# Patient Record
Sex: FEMALE | Race: WHITE | NOT HISPANIC OR LATINO
[De-identification: names, ages, dates, MRNs, and addresses within clinical notes are randomized per-mention and may not be internally consistent; named-entity substitution may affect disease eponyms.]

---

## 2017-01-30 ENCOUNTER — NON-APPOINTMENT (OUTPATIENT)
Age: 82
End: 2017-01-30

## 2017-01-30 ENCOUNTER — APPOINTMENT (OUTPATIENT)
Dept: ELECTROPHYSIOLOGY | Facility: CLINIC | Age: 82
End: 2017-01-30

## 2017-01-30 VITALS
DIASTOLIC BLOOD PRESSURE: 80 MMHG | WEIGHT: 154.32 LBS | HEART RATE: 52 BPM | BODY MASS INDEX: 28.23 KG/M2 | OXYGEN SATURATION: 98 % | SYSTOLIC BLOOD PRESSURE: 153 MMHG

## 2017-03-16 ENCOUNTER — APPOINTMENT (OUTPATIENT)
Dept: ELECTROPHYSIOLOGY | Facility: CLINIC | Age: 82
End: 2017-03-16

## 2017-03-20 ENCOUNTER — APPOINTMENT (OUTPATIENT)
Dept: ELECTROPHYSIOLOGY | Facility: CLINIC | Age: 82
End: 2017-03-20

## 2017-03-21 ENCOUNTER — OUTPATIENT (OUTPATIENT)
Dept: OUTPATIENT SERVICES | Facility: HOSPITAL | Age: 82
LOS: 1 days | End: 2017-03-21
Payer: COMMERCIAL

## 2017-03-21 ENCOUNTER — APPOINTMENT (OUTPATIENT)
Dept: UROLOGY | Facility: CLINIC | Age: 82
End: 2017-03-21

## 2017-03-21 VITALS
RESPIRATION RATE: 16 BRPM | DIASTOLIC BLOOD PRESSURE: 77 MMHG | SYSTOLIC BLOOD PRESSURE: 166 MMHG | HEART RATE: 69 BPM | TEMPERATURE: 97.9 F

## 2017-03-21 DIAGNOSIS — R35.0 FREQUENCY OF MICTURITION: ICD-10-CM

## 2017-03-21 DIAGNOSIS — Z92.89 PERSONAL HISTORY OF OTHER MEDICAL TREATMENT: Chronic | ICD-10-CM

## 2017-03-21 DIAGNOSIS — Z98.49 CATARACT EXTRACTION STATUS, UNSPECIFIED EYE: Chronic | ICD-10-CM

## 2017-03-21 DIAGNOSIS — Z85.51 PERSONAL HISTORY OF MALIGNANT NEOPLASM OF BLADDER: ICD-10-CM

## 2017-03-21 PROCEDURE — 52000 CYSTOURETHROSCOPY: CPT

## 2017-03-23 DIAGNOSIS — Z85.51 PERSONAL HISTORY OF MALIGNANT NEOPLASM OF BLADDER: ICD-10-CM

## 2017-03-23 LAB — CORE LAB FLUID CYTOLOGY: NORMAL

## 2017-03-28 ENCOUNTER — APPOINTMENT (OUTPATIENT)
Dept: ELECTROPHYSIOLOGY | Facility: CLINIC | Age: 82
End: 2017-03-28

## 2017-04-26 ENCOUNTER — APPOINTMENT (OUTPATIENT)
Dept: ELECTROPHYSIOLOGY | Facility: CLINIC | Age: 82
End: 2017-04-26

## 2017-04-26 ENCOUNTER — NON-APPOINTMENT (OUTPATIENT)
Age: 82
End: 2017-04-26

## 2017-04-26 VITALS
DIASTOLIC BLOOD PRESSURE: 76 MMHG | HEIGHT: 62 IN | SYSTOLIC BLOOD PRESSURE: 149 MMHG | HEART RATE: 51 BPM | OXYGEN SATURATION: 100 %

## 2017-05-30 ENCOUNTER — APPOINTMENT (OUTPATIENT)
Dept: ELECTROPHYSIOLOGY | Facility: CLINIC | Age: 82
End: 2017-05-30
Payer: MEDICARE

## 2017-05-30 PROCEDURE — 93298 REM INTERROG DEV EVAL SCRMS: CPT

## 2017-05-30 PROCEDURE — 93299: CPT

## 2017-07-06 ENCOUNTER — APPOINTMENT (OUTPATIENT)
Dept: ELECTROPHYSIOLOGY | Facility: CLINIC | Age: 82
End: 2017-07-06

## 2017-09-18 ENCOUNTER — FORM ENCOUNTER (OUTPATIENT)
Age: 82
End: 2017-09-18

## 2017-09-19 ENCOUNTER — APPOINTMENT (OUTPATIENT)
Dept: CT IMAGING | Facility: IMAGING CENTER | Age: 82
End: 2017-09-19
Payer: MEDICARE

## 2017-09-19 ENCOUNTER — OUTPATIENT (OUTPATIENT)
Dept: OUTPATIENT SERVICES | Facility: HOSPITAL | Age: 82
LOS: 1 days | End: 2017-09-19
Payer: COMMERCIAL

## 2017-09-19 ENCOUNTER — APPOINTMENT (OUTPATIENT)
Dept: UROLOGY | Facility: CLINIC | Age: 82
End: 2017-09-19
Payer: MEDICARE

## 2017-09-19 VITALS
RESPIRATION RATE: 16 BRPM | TEMPERATURE: 98.1 F | HEART RATE: 80 BPM | SYSTOLIC BLOOD PRESSURE: 162 MMHG | DIASTOLIC BLOOD PRESSURE: 84 MMHG

## 2017-09-19 DIAGNOSIS — R35.0 FREQUENCY OF MICTURITION: ICD-10-CM

## 2017-09-19 DIAGNOSIS — Z92.89 PERSONAL HISTORY OF OTHER MEDICAL TREATMENT: Chronic | ICD-10-CM

## 2017-09-19 DIAGNOSIS — Z85.51 PERSONAL HISTORY OF MALIGNANT NEOPLASM OF BLADDER: ICD-10-CM

## 2017-09-19 DIAGNOSIS — Z98.49 CATARACT EXTRACTION STATUS, UNSPECIFIED EYE: Chronic | ICD-10-CM

## 2017-09-19 PROCEDURE — 71260 CT THORAX DX C+: CPT | Mod: 26

## 2017-09-19 PROCEDURE — 52000 CYSTOURETHROSCOPY: CPT

## 2017-09-19 PROCEDURE — 82565 ASSAY OF CREATININE: CPT

## 2017-09-19 PROCEDURE — 74178 CT ABD&PLV WO CNTR FLWD CNTR: CPT | Mod: 26

## 2017-09-19 PROCEDURE — 74178 CT ABD&PLV WO CNTR FLWD CNTR: CPT

## 2017-09-19 PROCEDURE — 71260 CT THORAX DX C+: CPT

## 2017-09-20 DIAGNOSIS — Z85.51 PERSONAL HISTORY OF MALIGNANT NEOPLASM OF BLADDER: ICD-10-CM

## 2017-09-20 LAB — CORE LAB FLUID CYTOLOGY: NORMAL

## 2017-09-26 ENCOUNTER — APPOINTMENT (OUTPATIENT)
Dept: ELECTROPHYSIOLOGY | Facility: CLINIC | Age: 82
End: 2017-09-26

## 2017-09-29 ENCOUNTER — OTHER (OUTPATIENT)
Age: 82
End: 2017-09-29

## 2017-10-23 ENCOUNTER — NON-APPOINTMENT (OUTPATIENT)
Age: 82
End: 2017-10-23

## 2017-10-23 ENCOUNTER — APPOINTMENT (OUTPATIENT)
Dept: CARDIOLOGY | Facility: CLINIC | Age: 82
End: 2017-10-23
Payer: MEDICARE

## 2017-10-23 VITALS
DIASTOLIC BLOOD PRESSURE: 100 MMHG | SYSTOLIC BLOOD PRESSURE: 184 MMHG | BODY MASS INDEX: 30.18 KG/M2 | WEIGHT: 164.03 LBS | HEIGHT: 62 IN | HEART RATE: 63 BPM

## 2017-10-23 PROCEDURE — 99215 OFFICE O/P EST HI 40 MIN: CPT

## 2017-10-23 PROCEDURE — 93000 ELECTROCARDIOGRAM COMPLETE: CPT

## 2017-10-31 ENCOUNTER — MED ADMIN CHARGE (OUTPATIENT)
Age: 82
End: 2017-10-31

## 2017-11-11 ENCOUNTER — APPOINTMENT (OUTPATIENT)
Dept: CARDIOLOGY | Facility: CLINIC | Age: 82
End: 2017-11-11
Payer: MEDICARE

## 2017-11-11 PROCEDURE — 76775 US EXAM ABDO BACK WALL LIM: CPT

## 2017-11-15 ENCOUNTER — APPOINTMENT (OUTPATIENT)
Dept: CARDIOLOGY | Facility: CLINIC | Age: 82
End: 2017-11-15
Payer: MEDICARE

## 2017-11-15 PROCEDURE — 93306 TTE W/DOPPLER COMPLETE: CPT

## 2017-11-20 ENCOUNTER — NON-APPOINTMENT (OUTPATIENT)
Age: 82
End: 2017-11-20

## 2017-11-20 ENCOUNTER — APPOINTMENT (OUTPATIENT)
Dept: CARDIOLOGY | Facility: CLINIC | Age: 82
End: 2017-11-20
Payer: MEDICARE

## 2017-11-20 VITALS
OXYGEN SATURATION: 95 % | SYSTOLIC BLOOD PRESSURE: 169 MMHG | DIASTOLIC BLOOD PRESSURE: 64 MMHG | BODY MASS INDEX: 30.18 KG/M2 | WEIGHT: 164 LBS | HEIGHT: 62 IN | HEART RATE: 65 BPM

## 2017-11-20 PROCEDURE — 99215 OFFICE O/P EST HI 40 MIN: CPT

## 2017-11-20 PROCEDURE — 93000 ELECTROCARDIOGRAM COMPLETE: CPT

## 2018-05-23 ENCOUNTER — APPOINTMENT (OUTPATIENT)
Dept: CARDIOLOGY | Facility: CLINIC | Age: 83
End: 2018-05-23
Payer: MEDICARE

## 2018-05-23 ENCOUNTER — NON-APPOINTMENT (OUTPATIENT)
Age: 83
End: 2018-05-23

## 2018-05-23 VITALS
HEIGHT: 62 IN | WEIGHT: 166 LBS | BODY MASS INDEX: 30.55 KG/M2 | OXYGEN SATURATION: 96 % | HEART RATE: 62 BPM | SYSTOLIC BLOOD PRESSURE: 177 MMHG | DIASTOLIC BLOOD PRESSURE: 68 MMHG

## 2018-05-23 PROCEDURE — 93000 ELECTROCARDIOGRAM COMPLETE: CPT

## 2018-05-23 PROCEDURE — 99214 OFFICE O/P EST MOD 30 MIN: CPT

## 2018-05-24 ENCOUNTER — OTHER (OUTPATIENT)
Age: 83
End: 2018-05-24

## 2018-05-24 LAB
ALBUMIN SERPL ELPH-MCNC: 4.4 G/DL
ALP BLD-CCNC: 68 U/L
ALT SERPL-CCNC: 13 U/L
ANION GAP SERPL CALC-SCNC: 16 MMOL/L
AST SERPL-CCNC: 22 U/L
BASOPHILS # BLD AUTO: 0.01 K/UL
BASOPHILS NFR BLD AUTO: 0.1 %
BILIRUB SERPL-MCNC: 0.5 MG/DL
BUN SERPL-MCNC: 15 MG/DL
CALCIUM SERPL-MCNC: 9.5 MG/DL
CHLORIDE SERPL-SCNC: 103 MMOL/L
CHOLEST SERPL-MCNC: 232 MG/DL
CHOLEST/HDLC SERPL: 3.3 RATIO
CO2 SERPL-SCNC: 23 MMOL/L
CREAT SERPL-MCNC: 0.58 MG/DL
EOSINOPHIL # BLD AUTO: 0.18 K/UL
EOSINOPHIL NFR BLD AUTO: 2.5 %
GLUCOSE SERPL-MCNC: 103 MG/DL
HCT VFR BLD CALC: 41 %
HDLC SERPL-MCNC: 70 MG/DL
HGB BLD-MCNC: 12.9 G/DL
IMM GRANULOCYTES NFR BLD AUTO: 0.3 %
LDLC SERPL CALC-MCNC: 141 MG/DL
LYMPHOCYTES # BLD AUTO: 1.74 K/UL
LYMPHOCYTES NFR BLD AUTO: 24.1 %
MAN DIFF?: NORMAL
MCHC RBC-ENTMCNC: 29.6 PG
MCHC RBC-ENTMCNC: 31.5 GM/DL
MCV RBC AUTO: 94 FL
MONOCYTES # BLD AUTO: 0.56 K/UL
MONOCYTES NFR BLD AUTO: 7.7 %
NEUTROPHILS # BLD AUTO: 4.72 K/UL
NEUTROPHILS NFR BLD AUTO: 65.3 %
PLATELET # BLD AUTO: 188 K/UL
POTASSIUM SERPL-SCNC: 4.4 MMOL/L
PROT SERPL-MCNC: 7.3 G/DL
RBC # BLD: 4.36 M/UL
RBC # FLD: 13.9 %
SODIUM SERPL-SCNC: 142 MMOL/L
TRIGL SERPL-MCNC: 107 MG/DL
WBC # FLD AUTO: 7.23 K/UL

## 2018-05-30 ENCOUNTER — APPOINTMENT (OUTPATIENT)
Dept: ELECTROPHYSIOLOGY | Facility: CLINIC | Age: 83
End: 2018-05-30

## 2018-06-06 ENCOUNTER — APPOINTMENT (OUTPATIENT)
Dept: CARDIOLOGY | Facility: CLINIC | Age: 83
End: 2018-06-06

## 2018-06-27 ENCOUNTER — APPOINTMENT (OUTPATIENT)
Dept: ELECTROPHYSIOLOGY | Facility: CLINIC | Age: 83
End: 2018-06-27
Payer: MEDICARE

## 2018-06-27 VITALS
BODY MASS INDEX: 29.44 KG/M2 | HEART RATE: 56 BPM | DIASTOLIC BLOOD PRESSURE: 77 MMHG | SYSTOLIC BLOOD PRESSURE: 180 MMHG | WEIGHT: 160 LBS | HEIGHT: 62 IN | OXYGEN SATURATION: 96 %

## 2018-06-27 DIAGNOSIS — I47.1 SUPRAVENTRICULAR TACHYCARDIA: ICD-10-CM

## 2018-06-27 PROCEDURE — 99214 OFFICE O/P EST MOD 30 MIN: CPT

## 2018-06-27 PROCEDURE — 93000 ELECTROCARDIOGRAM COMPLETE: CPT

## 2018-07-08 PROBLEM — I47.1 PAROXYSMAL SVT (SUPRAVENTRICULAR TACHYCARDIA): Status: ACTIVE | Noted: 2018-07-08

## 2018-09-18 ENCOUNTER — APPOINTMENT (OUTPATIENT)
Dept: UROLOGY | Facility: CLINIC | Age: 83
End: 2018-09-18

## 2018-10-19 ENCOUNTER — OTHER (OUTPATIENT)
Age: 83
End: 2018-10-19

## 2018-12-19 ENCOUNTER — APPOINTMENT (OUTPATIENT)
Dept: CARDIOLOGY | Facility: CLINIC | Age: 83
End: 2018-12-19
Payer: MEDICARE

## 2018-12-19 ENCOUNTER — NON-APPOINTMENT (OUTPATIENT)
Age: 83
End: 2018-12-19

## 2018-12-19 VITALS
SYSTOLIC BLOOD PRESSURE: 174 MMHG | BODY MASS INDEX: 30.36 KG/M2 | WEIGHT: 165 LBS | HEART RATE: 53 BPM | DIASTOLIC BLOOD PRESSURE: 76 MMHG | OXYGEN SATURATION: 97 % | HEIGHT: 62 IN

## 2018-12-19 PROCEDURE — 99214 OFFICE O/P EST MOD 30 MIN: CPT

## 2018-12-19 PROCEDURE — 93000 ELECTROCARDIOGRAM COMPLETE: CPT

## 2018-12-19 NOTE — HISTORY OF PRESENT ILLNESS
[FreeTextEntry1] : Adry presented to the office today for a followup evaluation. She was last seen in the office 6 months ago.\par \par She is now 84 years old, with a history of bladder cancer. She has a history of elevated blood pressures in the office without a definite diagnosis of hypertension, not presently treated. In May of 2016, she had an episode of syncope.  While hospitalized, she was observed to have nonsustained ventricular tachycardia. She was transferred for further evaluation. Nuclear stress testing and echocardiography were unremarkable. A loop recorder was implanted. Over time this has revealed nsvt and svt, but she has had no symptoms.  She has a 4.0 cm AAA, which has been followed.\par \par She presents to the office today having been feeling well. S  She reports no chest discomfort or difficulty breathing with limited activities. She denies orthopnea, PND and edema. She denies palpitations, dizziness and syncope. She has not really been checking her blood pressures at home.

## 2018-12-19 NOTE — PHYSICAL EXAM
[Normal Appearance] : normal appearance [General Appearance - In No Acute Distress] : no acute distress [Normal Conjunctiva] : the conjunctiva exhibited no abnormalities [Normal Oral Mucosa] : normal oral mucosa [Normal Jugular Venous V Waves Present] : normal jugular venous V waves present [Respiration, Rhythm And Depth] : normal respiratory rhythm and effort [Exaggerated Use Of Accessory Muscles For Inspiration] : no accessory muscle use [Auscultation Breath Sounds / Voice Sounds] : lungs were clear to auscultation bilaterally [Bowel Sounds] : normal bowel sounds [Abdomen Soft] : soft [Abdomen Tenderness] : non-tender [Abnormal Walk] : normal gait [Nail Clubbing] : no clubbing of the fingernails [Cyanosis, Localized] : no localized cyanosis [Petechial Hemorrhages (___cm)] : no petechial hemorrhages [Skin Color & Pigmentation] : normal skin color and pigmentation [] : no rash [No Venous Stasis] : no venous stasis [Skin Lesions] : no skin lesions [Oriented To Time, Place, And Person] : oriented to person, place, and time [Affect] : the affect was normal [Mood] : the mood was normal [No Anxiety] : not feeling anxious [Not Palpable] : not palpable [No Precordial Heave] : no precordial heave was noted [Rhythm Regular] : regular [Normal S1] : normal S1 [Normal S2] : normal S2 [No Gallop] : no gallop heard [No Murmur] : no murmurs heard [Right Carotid Bruit] : right carotid bruit heard [Left Carotid Bruit] : left carotid bruit heard [1+] : left 1+ [No Pitting Edema] : no pitting edema present [FreeTextEntry1] : No JVD [Bradycardia] : bradycardic [Bruit] : no bruit heard

## 2018-12-19 NOTE — DISCUSSION/SUMMARY
[FreeTextEntry1] : Adry is doing overall well from the perspective of symptoms, but has a very high blood pressure, again today.  Her blood pressure at home has been much better controlled.  Certainly, with her aneurysm, her blood pressure needs to be optimized. She will take blood pressures daily over the next month, and when she brings her  in next time, she will show me this list. I would certainly put her on a beta blocker, except that she has a tendency towards sinus bradycardia. \par \par She will have a repeat Doppler of the abdominal aorta to be evaluated her aneurysm, with comparison made to a year ago.

## 2018-12-29 ENCOUNTER — APPOINTMENT (OUTPATIENT)
Dept: CARDIOLOGY | Facility: CLINIC | Age: 83
End: 2018-12-29
Payer: MEDICARE

## 2018-12-29 PROCEDURE — 76775 US EXAM ABDO BACK WALL LIM: CPT

## 2019-01-02 ENCOUNTER — APPOINTMENT (OUTPATIENT)
Dept: ELECTROPHYSIOLOGY | Facility: CLINIC | Age: 84
End: 2019-01-02

## 2019-01-04 ENCOUNTER — NON-APPOINTMENT (OUTPATIENT)
Age: 84
End: 2019-01-04

## 2019-01-04 ENCOUNTER — APPOINTMENT (OUTPATIENT)
Dept: ELECTROPHYSIOLOGY | Facility: CLINIC | Age: 84
End: 2019-01-04
Payer: MEDICARE

## 2019-01-04 VITALS
WEIGHT: 162 LBS | BODY MASS INDEX: 29.81 KG/M2 | HEIGHT: 62 IN | RESPIRATION RATE: 18 BRPM | SYSTOLIC BLOOD PRESSURE: 178 MMHG | DIASTOLIC BLOOD PRESSURE: 80 MMHG | HEART RATE: 67 BPM | OXYGEN SATURATION: 100 %

## 2019-01-04 DIAGNOSIS — I47.2 VENTRICULAR TACHYCARDIA: ICD-10-CM

## 2019-01-04 DIAGNOSIS — R55 SYNCOPE AND COLLAPSE: ICD-10-CM

## 2019-01-04 DIAGNOSIS — R00.1 BRADYCARDIA, UNSPECIFIED: ICD-10-CM

## 2019-01-04 PROCEDURE — 93000 ELECTROCARDIOGRAM COMPLETE: CPT

## 2019-01-04 PROCEDURE — 99214 OFFICE O/P EST MOD 30 MIN: CPT | Mod: 25

## 2019-02-04 ENCOUNTER — NON-APPOINTMENT (OUTPATIENT)
Age: 84
End: 2019-02-04

## 2019-02-04 ENCOUNTER — APPOINTMENT (OUTPATIENT)
Dept: CARDIOLOGY | Facility: CLINIC | Age: 84
End: 2019-02-04
Payer: MEDICARE

## 2019-02-04 VITALS
HEART RATE: 55 BPM | HEIGHT: 63 IN | SYSTOLIC BLOOD PRESSURE: 170 MMHG | WEIGHT: 167 LBS | DIASTOLIC BLOOD PRESSURE: 77 MMHG | OXYGEN SATURATION: 99 % | BODY MASS INDEX: 29.59 KG/M2

## 2019-02-04 PROCEDURE — 93000 ELECTROCARDIOGRAM COMPLETE: CPT

## 2019-02-04 PROCEDURE — 99214 OFFICE O/P EST MOD 30 MIN: CPT

## 2019-02-04 NOTE — DISCUSSION/SUMMARY
[FreeTextEntry1] : Adry is doing overall well from the perspective of symptoms, but has a very high blood pressure, again today.  Her blood pressure at home has been better controlled, but also high.  Certainly, with her aneurysm, her blood pressure needs to be optimized.  \par \par She will start amlodipine 5 mg daily. She will come back to the office again in a month said that her blood pressure can be reevaluated. She will continue to check her blood pressure at home in the meantime.

## 2019-02-04 NOTE — HISTORY OF PRESENT ILLNESS
[FreeTextEntry1] : Adry presented to the office today for a followup evaluation. She was last seen in the office 6 months ago.\par \par She is now 84 years old, with a history of bladder cancer. She has a history of elevated blood pressures in the office without a definite diagnosis of hypertension, not presently treated. In May of 2016, she had an episode of syncope.  While hospitalized, she was observed to have nonsustained ventricular tachycardia. She was transferred for further evaluation. Nuclear stress testing and echocardiography were unremarkable. A loop recorder was implanted. Over time this has revealed nsvt and svt, but she has had no symptoms.  She has a 4.0 cm AAA, which has been followed.\par \par At the last visit, she was feeling reasonably well. Her blood pressure was elevated, and I asked her to check it at home.\par \par She continues to feel well. She reports no symptoms of angina, heart failure or arrhythmia. Although her systolic blood pressure is intermittently in the high 130s, it is more commonly elevated, at times severely, into the 170s. Out of stress, financial, and related to her .

## 2019-02-04 NOTE — PHYSICAL EXAM
[Normal Appearance] : normal appearance [General Appearance - In No Acute Distress] : no acute distress [Normal Conjunctiva] : the conjunctiva exhibited no abnormalities [Normal Oral Mucosa] : normal oral mucosa [Normal Jugular Venous V Waves Present] : normal jugular venous V waves present [Respiration, Rhythm And Depth] : normal respiratory rhythm and effort [Exaggerated Use Of Accessory Muscles For Inspiration] : no accessory muscle use [Auscultation Breath Sounds / Voice Sounds] : lungs were clear to auscultation bilaterally [Bowel Sounds] : normal bowel sounds [Abdomen Soft] : soft [Abdomen Tenderness] : non-tender [Abnormal Walk] : normal gait [Nail Clubbing] : no clubbing of the fingernails [Cyanosis, Localized] : no localized cyanosis [Petechial Hemorrhages (___cm)] : no petechial hemorrhages [Skin Color & Pigmentation] : normal skin color and pigmentation [] : no rash [No Venous Stasis] : no venous stasis [Skin Lesions] : no skin lesions [Oriented To Time, Place, And Person] : oriented to person, place, and time [Affect] : the affect was normal [Mood] : the mood was normal [No Anxiety] : not feeling anxious [Not Palpable] : not palpable [No Precordial Heave] : no precordial heave was noted [Bradycardia] : bradycardic [Rhythm Regular] : regular [Normal S1] : normal S1 [Normal S2] : normal S2 [No Gallop] : no gallop heard [No Murmur] : no murmurs heard [Right Carotid Bruit] : right carotid bruit heard [Left Carotid Bruit] : left carotid bruit heard [1+] : left 1+ [No Pitting Edema] : no pitting edema present [FreeTextEntry1] : No JVD [Bruit] : no bruit heard

## 2019-03-04 ENCOUNTER — NON-APPOINTMENT (OUTPATIENT)
Age: 84
End: 2019-03-04

## 2019-03-04 ENCOUNTER — APPOINTMENT (OUTPATIENT)
Dept: CARDIOLOGY | Facility: CLINIC | Age: 84
End: 2019-03-04
Payer: MEDICARE

## 2019-03-04 VITALS
BODY MASS INDEX: 29.95 KG/M2 | WEIGHT: 169 LBS | SYSTOLIC BLOOD PRESSURE: 172 MMHG | DIASTOLIC BLOOD PRESSURE: 78 MMHG | HEART RATE: 61 BPM | OXYGEN SATURATION: 98 % | HEIGHT: 63 IN

## 2019-03-04 PROCEDURE — 93000 ELECTROCARDIOGRAM COMPLETE: CPT

## 2019-03-04 PROCEDURE — 99214 OFFICE O/P EST MOD 30 MIN: CPT

## 2019-03-04 NOTE — PHYSICAL EXAM
[Normal Appearance] : normal appearance [General Appearance - In No Acute Distress] : no acute distress [Normal Conjunctiva] : the conjunctiva exhibited no abnormalities [Normal Oral Mucosa] : normal oral mucosa [Normal Jugular Venous V Waves Present] : normal jugular venous V waves present [FreeTextEntry1] : No JVD [Abnormal Walk] : normal gait [Nail Clubbing] : no clubbing of the fingernails [Cyanosis, Localized] : no localized cyanosis [Petechial Hemorrhages (___cm)] : no petechial hemorrhages [Skin Color & Pigmentation] : normal skin color and pigmentation [] : no rash [No Venous Stasis] : no venous stasis [Skin Lesions] : no skin lesions [Oriented To Time, Place, And Person] : oriented to person, place, and time [Affect] : the affect was normal [Mood] : the mood was normal [No Anxiety] : not feeling anxious

## 2019-03-04 NOTE — DISCUSSION/SUMMARY
[FreeTextEntry1] : Adry is doing overall well from the perspective of symptoms.  She has a very high blood pressure, again today.  Her blood pressure at home has been better controlled.  Her monitor works quite wel.\par \par She will continue amlodipine 5 mg daily. She will return in 3 months for another evaluation.

## 2019-03-04 NOTE — HISTORY OF PRESENT ILLNESS
[FreeTextEntry1] : Adry presented to the office today for a followup evaluation. She was last seen in the office 1 month ago.\par \par She is now 84 years old, with a history of bladder cancer. She has a history of elevated blood pressures in the office without a definite diagnosis of hypertension, not presently treated. In May of 2016, she had an episode of syncope.  While hospitalized, she was observed to have nonsustained ventricular tachycardia. She was transferred for further evaluation. Nuclear stress testing and echocardiography were unremarkable. A loop recorder was implanted. Over time this has revealed nsvt and svt, but she has had no symptoms.  She has a 4.0 cm AAA, which has been followed.\par \par At the last visit, she was feeling reasonably well. Her blood pressure was elevated.  I added amloidpine and asked that she continue checking it at home.\par \par She continues to feel well. She reports no symptoms of angina, heart failure or arrhythmia. Her blood pressure is labile.  it is improved overall, with readings commonly in the 120's/70's, with exceptions.

## 2019-03-09 PROBLEM — R00.1 BRADYCARDIA, SINUS: Status: ACTIVE | Noted: 2018-07-08

## 2019-03-09 PROBLEM — I47.2 VENTRICULAR TACHYCARDIA: Status: ACTIVE | Noted: 2017-10-23

## 2019-03-09 NOTE — DISCUSSION/SUMMARY
[FreeTextEntry1] : No recurrence of syncope.\par ILR interrogation: no tachy or harley\par Prior interrogation had shown both SVT and VT recorded on the ILR - had no symptoms. \par No recurrence of VT since Sept 22 2017\par Prior Echo EF nl\par Remote monitoring of ILR\par follow up with Dr. Riley.

## 2019-03-09 NOTE — PHYSICAL EXAM
[General Appearance - Well Developed] : well developed [General Appearance - In No Acute Distress] : no acute distress [Normal Conjunctiva] : the conjunctiva exhibited no abnormalities [No Oral Pallor] : no oral pallor [Normal Jugular Venous V Waves Present] : normal jugular venous V waves present [Respiration, Rhythm And Depth] : normal respiratory rhythm and effort [Auscultation Breath Sounds / Voice Sounds] : lungs were clear to auscultation bilaterally [Heart Rate And Rhythm] : heart rate and rhythm were normal [Heart Sounds] : normal S1 and S2 [Arterial Pulses Normal] : the arterial pulses were normal [Edema] : no peripheral edema present [Abnormal Walk] : normal gait [Nail Clubbing] : no clubbing of the fingernails [Cyanosis, Localized] : no localized cyanosis [No Venous Stasis] : no venous stasis [No Anxiety] : not feeling anxious

## 2019-03-09 NOTE — REVIEW OF SYSTEMS
[Feeling Fatigued] : feeling fatigued [Joint Pain] : joint pain [Negative] : ENT [Recent Weight Gain (___ Lbs)] : no recent weight gain [Recent Weight Loss (___ Lbs)] : no recent weight loss [Shortness Of Breath] : no shortness of breath [Chest Pain] : no chest pain [Palpitations] : no palpitations [Cough] : no cough [Abdominal Pain] : no abdominal pain [Dysuria] : no dysuria [Skin: A Rash] : no rash: [Dizziness] : no dizziness [Confusion] : no confusion was observed [Anxiety] : no anxiety [Excessive Thirst] : no polydipsia [Easy Bleeding] : no tendency for easy bleeding [Easy Bruising] : no tendency for easy bruising

## 2019-03-09 NOTE — HISTORY OF PRESENT ILLNESS
[FreeTextEntry1] : Patient is an 85 yo woman with prior episode of Syncope 2016 and had ILR implanted. \par She has had no recurrence of syncope since then. Denies palps, chest pain, SOB, dizziness\par She has h/o HTN treated with CCB and small Abd Aneurysm. \par  \par Prior Echo showed normal LV function\par \par ILR interrogation: Battery status good. No tachy/harley noted. \par \par

## 2019-04-29 ENCOUNTER — EMERGENCY (EMERGENCY)
Facility: HOSPITAL | Age: 84
LOS: 1 days | Discharge: ANOTHER HOSPITAL | End: 2019-04-29
Attending: EMERGENCY MEDICINE | Admitting: EMERGENCY MEDICINE
Payer: MEDICARE

## 2019-04-29 ENCOUNTER — INPATIENT (INPATIENT)
Facility: HOSPITAL | Age: 84
LOS: 1 days | Discharge: HOME | DRG: 247 | End: 2019-05-01
Attending: INTERNAL MEDICINE | Admitting: INTERNAL MEDICINE
Payer: MEDICARE

## 2019-04-29 ENCOUNTER — TRANSCRIPTION ENCOUNTER (OUTPATIENT)
Age: 84
End: 2019-04-29

## 2019-04-29 VITALS
HEIGHT: 62 IN | SYSTOLIC BLOOD PRESSURE: 135 MMHG | DIASTOLIC BLOOD PRESSURE: 76 MMHG | WEIGHT: 162.04 LBS | HEART RATE: 86 BPM | TEMPERATURE: 98 F | OXYGEN SATURATION: 99 % | RESPIRATION RATE: 15 BRPM

## 2019-04-29 VITALS
DIASTOLIC BLOOD PRESSURE: 76 MMHG | OXYGEN SATURATION: 95 % | RESPIRATION RATE: 16 BRPM | HEART RATE: 59 BPM | SYSTOLIC BLOOD PRESSURE: 108 MMHG

## 2019-04-29 VITALS
DIASTOLIC BLOOD PRESSURE: 59 MMHG | HEART RATE: 64 BPM | RESPIRATION RATE: 14 BRPM | OXYGEN SATURATION: 100 % | SYSTOLIC BLOOD PRESSURE: 144 MMHG | TEMPERATURE: 97 F

## 2019-04-29 DIAGNOSIS — Z92.89 PERSONAL HISTORY OF OTHER MEDICAL TREATMENT: Chronic | ICD-10-CM

## 2019-04-29 DIAGNOSIS — Z98.49 CATARACT EXTRACTION STATUS, UNSPECIFIED EYE: Chronic | ICD-10-CM

## 2019-04-29 DIAGNOSIS — I21.4 NON-ST ELEVATION (NSTEMI) MYOCARDIAL INFARCTION: ICD-10-CM

## 2019-04-29 LAB
ALBUMIN SERPL ELPH-MCNC: 3.7 G/DL — SIGNIFICANT CHANGE UP (ref 3.3–5)
ALBUMIN SERPL ELPH-MCNC: 4.1 G/DL — SIGNIFICANT CHANGE UP (ref 3.3–5)
ALP SERPL-CCNC: 79 U/L — SIGNIFICANT CHANGE UP (ref 40–120)
ALP SERPL-CCNC: 93 U/L — SIGNIFICANT CHANGE UP (ref 40–120)
ALT FLD-CCNC: 39 U/L — SIGNIFICANT CHANGE UP (ref 10–45)
ALT FLD-CCNC: 48 U/L — SIGNIFICANT CHANGE UP (ref 12–78)
ANION GAP SERPL CALC-SCNC: 14 MMOL/L — SIGNIFICANT CHANGE UP (ref 5–17)
ANION GAP SERPL CALC-SCNC: 7 MMOL/L — SIGNIFICANT CHANGE UP (ref 5–17)
APTT BLD: 30.3 SEC — SIGNIFICANT CHANGE UP (ref 27.5–36.3)
AST SERPL-CCNC: 181 U/L — HIGH (ref 10–40)
AST SERPL-CCNC: 301 U/L — HIGH (ref 15–37)
BASOPHILS # BLD AUTO: 0.01 K/UL — SIGNIFICANT CHANGE UP (ref 0–0.2)
BASOPHILS NFR BLD AUTO: 0.1 % — SIGNIFICANT CHANGE UP (ref 0–2)
BILIRUB SERPL-MCNC: 0.5 MG/DL — SIGNIFICANT CHANGE UP (ref 0.2–1.2)
BILIRUB SERPL-MCNC: 0.6 MG/DL — SIGNIFICANT CHANGE UP (ref 0.2–1.2)
BUN SERPL-MCNC: 12 MG/DL — SIGNIFICANT CHANGE UP (ref 7–23)
BUN SERPL-MCNC: 12 MG/DL — SIGNIFICANT CHANGE UP (ref 7–23)
CALCIUM SERPL-MCNC: 9 MG/DL — SIGNIFICANT CHANGE UP (ref 8.5–10.1)
CALCIUM SERPL-MCNC: 9.6 MG/DL — SIGNIFICANT CHANGE UP (ref 8.4–10.5)
CHLORIDE SERPL-SCNC: 101 MMOL/L — SIGNIFICANT CHANGE UP (ref 96–108)
CHLORIDE SERPL-SCNC: 106 MMOL/L — SIGNIFICANT CHANGE UP (ref 96–108)
CK MB CFR SERPL CALC: 180.6 NG/ML — HIGH (ref 0–3.6)
CO2 SERPL-SCNC: 23 MMOL/L — SIGNIFICANT CHANGE UP (ref 22–31)
CO2 SERPL-SCNC: 28 MMOL/L — SIGNIFICANT CHANGE UP (ref 22–31)
CREAT SERPL-MCNC: 0.7 MG/DL — SIGNIFICANT CHANGE UP (ref 0.5–1.3)
CREAT SERPL-MCNC: 0.86 MG/DL — SIGNIFICANT CHANGE UP (ref 0.5–1.3)
D DIMER BLD IA.RAPID-MCNC: 417 NG/ML DDU — HIGH
EOSINOPHIL # BLD AUTO: 0.11 K/UL — SIGNIFICANT CHANGE UP (ref 0–0.5)
EOSINOPHIL NFR BLD AUTO: 1.3 % — SIGNIFICANT CHANGE UP (ref 0–6)
GLUCOSE BLDC GLUCOMTR-MCNC: 99 MG/DL — SIGNIFICANT CHANGE UP (ref 70–99)
GLUCOSE SERPL-MCNC: 103 MG/DL — HIGH (ref 70–99)
GLUCOSE SERPL-MCNC: 133 MG/DL — HIGH (ref 70–99)
HCT VFR BLD CALC: 42.1 % — SIGNIFICANT CHANGE UP (ref 34.5–45)
HCT VFR BLD CALC: 43.1 % — SIGNIFICANT CHANGE UP (ref 34.5–45)
HGB BLD-MCNC: 13.8 G/DL — SIGNIFICANT CHANGE UP (ref 11.5–15.5)
HGB BLD-MCNC: 14.6 G/DL — SIGNIFICANT CHANGE UP (ref 11.5–15.5)
IMM GRANULOCYTES NFR BLD AUTO: 0.4 % — SIGNIFICANT CHANGE UP (ref 0–1.5)
INR BLD: 1.38 RATIO — HIGH (ref 0.88–1.16)
LIDOCAIN IGE QN: 76 U/L — SIGNIFICANT CHANGE UP (ref 73–393)
LYMPHOCYTES # BLD AUTO: 2.03 K/UL — SIGNIFICANT CHANGE UP (ref 1–3.3)
LYMPHOCYTES # BLD AUTO: 23.9 % — SIGNIFICANT CHANGE UP (ref 13–44)
MCHC RBC-ENTMCNC: 30.1 PG — SIGNIFICANT CHANGE UP (ref 27–34)
MCHC RBC-ENTMCNC: 31.7 PG — SIGNIFICANT CHANGE UP (ref 27–34)
MCHC RBC-ENTMCNC: 32.8 GM/DL — SIGNIFICANT CHANGE UP (ref 32–36)
MCHC RBC-ENTMCNC: 33.8 GM/DL — SIGNIFICANT CHANGE UP (ref 32–36)
MCV RBC AUTO: 91.9 FL — SIGNIFICANT CHANGE UP (ref 80–100)
MCV RBC AUTO: 93.6 FL — SIGNIFICANT CHANGE UP (ref 80–100)
MONOCYTES # BLD AUTO: 0.88 K/UL — SIGNIFICANT CHANGE UP (ref 0–0.9)
MONOCYTES NFR BLD AUTO: 10.4 % — SIGNIFICANT CHANGE UP (ref 2–14)
NEUTROPHILS # BLD AUTO: 5.44 K/UL — SIGNIFICANT CHANGE UP (ref 1.8–7.4)
NEUTROPHILS NFR BLD AUTO: 63.9 % — SIGNIFICANT CHANGE UP (ref 43–77)
NRBC # BLD: 0 /100 WBCS — SIGNIFICANT CHANGE UP (ref 0–0)
PLATELET # BLD AUTO: 158 K/UL — SIGNIFICANT CHANGE UP (ref 150–400)
PLATELET # BLD AUTO: 191 K/UL — SIGNIFICANT CHANGE UP (ref 150–400)
POTASSIUM SERPL-MCNC: 3.8 MMOL/L — SIGNIFICANT CHANGE UP (ref 3.5–5.3)
POTASSIUM SERPL-MCNC: 6.2 MMOL/L — CRITICAL HIGH (ref 3.5–5.3)
POTASSIUM SERPL-SCNC: 3.8 MMOL/L — SIGNIFICANT CHANGE UP (ref 3.5–5.3)
POTASSIUM SERPL-SCNC: 6.2 MMOL/L — CRITICAL HIGH (ref 3.5–5.3)
PROT SERPL-MCNC: 7.4 G/DL — SIGNIFICANT CHANGE UP (ref 6–8.3)
PROT SERPL-MCNC: 7.7 G/DL — SIGNIFICANT CHANGE UP (ref 6–8.3)
PROTHROM AB SERPL-ACNC: 15.8 SEC — HIGH (ref 10–12.9)
RBC # BLD: 4.58 M/UL — SIGNIFICANT CHANGE UP (ref 3.8–5.2)
RBC # BLD: 4.61 M/UL — SIGNIFICANT CHANGE UP (ref 3.8–5.2)
RBC # FLD: 12.2 % — SIGNIFICANT CHANGE UP (ref 10.3–14.5)
RBC # FLD: 13.1 % — SIGNIFICANT CHANGE UP (ref 10.3–14.5)
SODIUM SERPL-SCNC: 138 MMOL/L — SIGNIFICANT CHANGE UP (ref 135–145)
SODIUM SERPL-SCNC: 141 MMOL/L — SIGNIFICANT CHANGE UP (ref 135–145)
TROPONIN I SERPL-MCNC: 92.5 NG/ML — HIGH (ref 0.01–0.04)
WBC # BLD: 8.5 K/UL — SIGNIFICANT CHANGE UP (ref 3.8–10.5)
WBC # BLD: 8.8 K/UL — SIGNIFICANT CHANGE UP (ref 3.8–10.5)
WBC # FLD AUTO: 8.5 K/UL — SIGNIFICANT CHANGE UP (ref 3.8–10.5)
WBC # FLD AUTO: 8.8 K/UL — SIGNIFICANT CHANGE UP (ref 3.8–10.5)

## 2019-04-29 PROCEDURE — 71046 X-RAY EXAM CHEST 2 VIEWS: CPT

## 2019-04-29 PROCEDURE — 99285 EMERGENCY DEPT VISIT HI MDM: CPT

## 2019-04-29 PROCEDURE — 92928 PRQ TCAT PLMT NTRAC ST 1 LES: CPT | Mod: RC,GC

## 2019-04-29 PROCEDURE — 93005 ELECTROCARDIOGRAM TRACING: CPT

## 2019-04-29 PROCEDURE — 71046 X-RAY EXAM CHEST 2 VIEWS: CPT | Mod: 26

## 2019-04-29 PROCEDURE — 85027 COMPLETE CBC AUTOMATED: CPT

## 2019-04-29 PROCEDURE — 93010 ELECTROCARDIOGRAM REPORT: CPT

## 2019-04-29 PROCEDURE — 99285 EMERGENCY DEPT VISIT HI MDM: CPT | Mod: 25

## 2019-04-29 PROCEDURE — 84484 ASSAY OF TROPONIN QUANT: CPT

## 2019-04-29 PROCEDURE — 85379 FIBRIN DEGRADATION QUANT: CPT

## 2019-04-29 PROCEDURE — 36415 COLL VENOUS BLD VENIPUNCTURE: CPT

## 2019-04-29 PROCEDURE — 93454 CORONARY ARTERY ANGIO S&I: CPT | Mod: 26,59,GC

## 2019-04-29 PROCEDURE — 83690 ASSAY OF LIPASE: CPT

## 2019-04-29 PROCEDURE — 99152 MOD SED SAME PHYS/QHP 5/>YRS: CPT | Mod: GC

## 2019-04-29 PROCEDURE — 70450 CT HEAD/BRAIN W/O DYE: CPT | Mod: 26

## 2019-04-29 PROCEDURE — 82553 CREATINE MB FRACTION: CPT

## 2019-04-29 PROCEDURE — 85610 PROTHROMBIN TIME: CPT

## 2019-04-29 PROCEDURE — 92941 PRQ TRLML REVSC TOT OCCL AMI: CPT | Mod: LC,GC

## 2019-04-29 PROCEDURE — 80053 COMPREHEN METABOLIC PANEL: CPT

## 2019-04-29 PROCEDURE — 85730 THROMBOPLASTIN TIME PARTIAL: CPT

## 2019-04-29 RX ORDER — LACTOBACILLUS ACIDOPHILUS 100MM CELL
1 CAPSULE ORAL DAILY
Qty: 0 | Refills: 0 | Status: DISCONTINUED | OUTPATIENT
Start: 2019-04-29 | End: 2019-05-01

## 2019-04-29 RX ORDER — ASPIRIN/CALCIUM CARB/MAGNESIUM 324 MG
81 TABLET ORAL DAILY
Qty: 0 | Refills: 0 | Status: DISCONTINUED | OUTPATIENT
Start: 2019-04-30 | End: 2019-05-01

## 2019-04-29 RX ORDER — TICAGRELOR 90 MG/1
90 TABLET ORAL
Qty: 0 | Refills: 0 | Status: DISCONTINUED | OUTPATIENT
Start: 2019-04-30 | End: 2019-05-01

## 2019-04-29 RX ORDER — ASCORBIC ACID 60 MG
500 TABLET,CHEWABLE ORAL DAILY
Qty: 0 | Refills: 0 | Status: DISCONTINUED | OUTPATIENT
Start: 2019-04-29 | End: 2019-05-01

## 2019-04-29 RX ORDER — CHOLECALCIFEROL (VITAMIN D3) 125 MCG
2000 CAPSULE ORAL DAILY
Qty: 0 | Refills: 0 | Status: DISCONTINUED | OUTPATIENT
Start: 2019-04-29 | End: 2019-05-01

## 2019-04-29 RX ORDER — ASPIRIN/CALCIUM CARB/MAGNESIUM 324 MG
325 TABLET ORAL ONCE
Qty: 0 | Refills: 0 | Status: COMPLETED | OUTPATIENT
Start: 2019-04-29 | End: 2019-04-29

## 2019-04-29 RX ADMIN — Medication 500 MILLIGRAM(S): at 23:19

## 2019-04-29 RX ADMIN — Medication 325 MILLIGRAM(S): at 17:49

## 2019-04-29 RX ADMIN — Medication 2000 UNIT(S): at 23:19

## 2019-04-29 RX ADMIN — Medication 1 TABLET(S): at 23:19

## 2019-04-29 NOTE — ED ADULT NURSE NOTE - OBJECTIVE STATEMENT
Received the patient in the Er. Patient is alert and oriented. Skin warm and dry. C/O Chest pressure since yesterday. Denies any pain at this time.

## 2019-04-29 NOTE — CONSULT NOTE ADULT - SUBJECTIVE AND OBJECTIVE BOX
Neurology Consult Note    Name  VANNESA HICKS    HPI:    Patient is an 85 year old Female with PMHx of AAA, HTN, admitted for cardiac cath for chest pain, presenting post-procedure w/ LUE weakness.  Per cardiac team, patient had just been in cardiac cath procedure, in her usual state, but after being brought to the [facility], she was noted to have LUE weakness, which was resolving but still present.  Code stroke called, NIHSS 0, MRS 0, tPA not administered as symptoms resolved and patient on heparin.  She denies any headache, vision changes, or sensory changes.      Subjective:    Review of Systems:  Constitutional:        Eyes, Ears, Mouth, Throat:   Respiratory:                            Cardiovascular:   Gastrointestinal:                                     Genitourinary:   Musculoskeletal:                                    Dermatologic:   Neurological: as per above                                                                 Psychiatric:   Endocrine:              Hematologic/Lymphatic:     MEDICATIONS  (STANDING):    MEDICATIONS  (PRN):      Allergies    adhesives (Other)  latex (Rash)  No Known Drug Allergies    Intolerances    statins (Muscle Pain)    PAST MEDICAL & SURGICAL HISTORY:  HTN (hypertension)  Bladder cancer  No pertinent past medical history  Back pain, chronic: occasional lower back pain  Benign paroxysmal vertigo of right ear: history of right ear abscess  Bladder neoplasm  Bladder cancer: dx 2012 treated with BCG  Status post cataract extraction: OD  History of cardiac monitoring: loop monitor  No significant past surgical history  S/P cystoscopy  S/P D&C  S/P breast biopsy, left    FH:  SH:    Objective:   Vital Signs Last 24 Hrs  T(C): 36.2 (29 Apr 2019 18:04), Max: 36.7 (29 Apr 2019 16:04)  T(F): 97.2 (29 Apr 2019 18:04), Max: 98.1 (29 Apr 2019 16:04)  HR: 64 (29 Apr 2019 18:04) (64 - 86)  BP: 144/59 (29 Apr 2019 18:04) (128/60 - 144/59)  BP(mean): --  RR: 14 (29 Apr 2019 18:04) (14 - 15)  SpO2: 100% (29 Apr 2019 18:04) (98% - 100%)    General Exam:   General appearance: No acute distress                   Neurological Exam:  Mental Status: Orientated to self, date and place.  Attention intact.  No dysarthria, aphasia or neglect.  Knowledge intact.  Registration intact.  Short and long term memory grossly intact.      Cranial Nerves:  PERRL, EOMI, VFF, no nystagmus or diplopia.  CN V1-3 intact to light touch and pinprick.  No facial asymmetry.  Hearing intact to finger rub bilaterally.  Tongue, uvula and palate midline.  Sternocleidomastoid and Trapezius intact bilaterally.    Motor:   Tone: normal.                  Strength:     [] Upper extremity                      Delt       Bicep    Tricep                                                  R         5/5 5/5        5/5       5/5                                               L          5/5        5/5        5/5       5/5  [] Lower extremity                       HF          KE          KF        DF         PF                                               R        5/5 5/5        5/5       5/5       5/5                                               L         5/5 5/5       5/5       5/5 5/5  Pronator drift: none                 Dysmetria: None to finger-nose-finger    Sensation: grossly intact to light touch    Deep Tendon Reflexes: 1+ bilateral biceps, triceps, brachioradialis, knee and ankle  Toes flexor bilaterally      Other:                        13.8   8.50  )-----------( 191      ( 29 Apr 2019 16:40 )             42.1     04-29    141  |  106  |  12  ----------------------------<  133<H>  3.8   |  28  |  0.86    Ca    9.0      29 Apr 2019 16:40    TPro  7.7  /  Alb  3.7  /  TBili  0.6  /  DBili  x   /  AST  301<H>  /  ALT  48  /  AlkPhos  93  04-29    LIVER FUNCTIONS - ( 29 Apr 2019 16:40 )  Alb: 3.7 g/dL / Pro: 7.7 g/dL / ALK PHOS: 93 U/L / ALT: 48 U/L / AST: 301 U/L / GGT: x           PT/INR - ( 29 Apr 2019 16:40 )   PT: 15.8 sec;   INR: 1.38 ratio         PTT - ( 29 Apr 2019 16:40 )  PTT:30.3 sec    Radiology    EKG:  tele:  TTE:  EEG:

## 2019-04-29 NOTE — CONSULT NOTE ADULT - ASSESSMENT
85 year old Female with PMHx of AAA, HTN, admitted for cardiac cath for chest pain, presenting post-procedure w/ LUE weakness.  Neurological exam non-focal.  CTH pending read, but appears grossly normal.      Plan:  - MRI brain w/o contrast when patient stable from cardiac perspective  - start ASA 81mg  - start Lipitor 80mg qhs  - LDL, Hba1c  - physical therapy/OT 85 year old Female with PMHx of AAA, HTN, admitted for cardiac cath for chest pain, presenting post-procedure w/ LUE weakness.  Neurological exam non-focal.  CTH negative.    Plan:  - MRI brain w/o contrast when patient stable from cardiac perspective  - start ASA 81mg  - start Lipitor 80mg qhs  - LDL, Hba1c  - physical therapy/OT

## 2019-04-29 NOTE — H&P CARDIOLOGY - HISTORY OF PRESENT ILLNESS
86 y/o female, recent loop recorder placed by Dr. DHARMESH Ibarra, pmhx AAA, HTN, syncope, presents to [facility] with c/o chest pain. Positive for NSTEMI, seen by Dr. VIC Riley at [facility], transferred to [facility] for cardiac cath on 4/29/19. Stents placed to the mid RCA and mid circumflex on 4/29/19 via right ulnar artery access. Post procedure patient c/o inability to move left arm or left leg, evaluated by Neurology team, sent for STAT head CT without contrast. Head CT negative for infarct, unchanged from prior CT, patient currently A&O x3 with full ROM in all extremities, no c/o headache, blurred vision, dizziness, N/V.

## 2019-04-29 NOTE — CONSULT NOTE ADULT - ATTENDING COMMENTS
VASCULAR NEUROLOGY ATTENDING  The patient is seen and examined the history and imaging are reviewed. I agree with the resident note unless otherwise noted. Patient appears at neurologic baseline. Agree with antiplatelets. Will arrange outpatient neurology follow-up.

## 2019-04-29 NOTE — H&P CARDIOLOGY - PMH
AAA (abdominal aortic aneurysm)    Back pain, chronic  occasional lower back pain  Benign paroxysmal vertigo of right ear  history of right ear abscess  Bladder cancer  dx 2012 treated with BCG  Coronary artery disease of native artery of native heart with stable angina pectoris    History of loop recorder  placed by Dr. DHARMESH Ibarra  HTN (hypertension)    Syncope, unspecified syncope type

## 2019-04-29 NOTE — PROGRESS NOTE ADULT - SUBJECTIVE AND OBJECTIVE BOX
Removal of Right Radial Band    Pulses in the right upper extremity are (palpable/audible by doppler/absent). The patient was placed in the supine position. The insertion site was identified and the band deflated per protocol. The radial band was removed slowly. Direct pressure was applied for 15 minutes.    Monitoring of the right wrist and both upper extremities including neuro-vascular checks and vital signs every 15 minutes x 4.    Complications: None    Comments: Removal of Right Ulnar Band    Pulses in the right upper extremity are palpable. The patient was placed in the supine position. The insertion site was identified and the band deflated per protocol. The ulnar band was removed slowly. Direct pressure was applied for 15 minutes.    Monitoring of the right wrist and both upper extremities including neuro-vascular checks and vital signs every 15 minutes x 4.    Complications: None    Comments:

## 2019-04-29 NOTE — ED PROVIDER NOTE - OBJECTIVE STATEMENT
84 y/o F with c/o epigastric pain/chest pain x 1 day associated with SOB.  Pt denies radiation of pain. + nausea.  Pt tried to make herself vomit without relief of symptoms.  Pt states chest pain is intermittent.  Pt called her cardiologist, Dr. Riley, and was told to go to the ED.

## 2019-04-29 NOTE — CHART NOTE - NSCHARTNOTEFT_GEN_A_CORE
SHAHIDA Diaz RN, received call from [facility] lab for serum K+ level of 6.2, reports hemolyzed specimen. Patient keke A&O x3, SR 72 bpm on telemetry, will recheck serum K+ with next blood draw.          Colin Morin, [facility]    [phone] SHAHIDA Diaz RN, received call from [facility] lab for serum K+ level of 6.2, reports hemolyzed specimen. Patient currently A&O x3, SR 72 bpm on telemetry, will recheck serum K+ with next blood draw.          Colin Morin, [facility]    [phone]

## 2019-04-29 NOTE — PATIENT PROFILE ADULT - EQUIPMENT CURRENTLY USED AT HOME
----- Message from Brett Mullins MD sent at 6/23/2018 11:00 AM CDT -----  Regarding: ferritin  Please call patient with normal/stable ferritin level. No evidence of further blood loss or iron loss.  Thanks  GR   no

## 2019-04-29 NOTE — ED ADULT NURSE NOTE - PMH
Back pain, chronic  occasional lower back pain  Benign paroxysmal vertigo of right ear  history of right ear abscess  Bladder cancer  dx 2012 treated with BCG  HTN (hypertension)

## 2019-04-30 ENCOUNTER — TRANSCRIPTION ENCOUNTER (OUTPATIENT)
Age: 84
End: 2019-04-30

## 2019-04-30 LAB
ANION GAP SERPL CALC-SCNC: 13 MMOL/L — SIGNIFICANT CHANGE UP (ref 5–17)
BASOPHILS # BLD AUTO: 0 K/UL — SIGNIFICANT CHANGE UP (ref 0–0.2)
BASOPHILS NFR BLD AUTO: 0.1 % — SIGNIFICANT CHANGE UP (ref 0–2)
BUN SERPL-MCNC: 14 MG/DL — SIGNIFICANT CHANGE UP (ref 7–23)
CALCIUM SERPL-MCNC: 9.5 MG/DL — SIGNIFICANT CHANGE UP (ref 8.4–10.5)
CHLORIDE SERPL-SCNC: 102 MMOL/L — SIGNIFICANT CHANGE UP (ref 96–108)
CK MB BLD-MCNC: 5.3 % — HIGH (ref 0–3.5)
CK MB BLD-MCNC: 7 % — HIGH (ref 0–3.5)
CK MB BLD-MCNC: 8.2 % — HIGH (ref 0–3.5)
CK MB CFR SERPL CALC: 24.5 NG/ML — HIGH (ref 0–3.8)
CK MB CFR SERPL CALC: 40.8 NG/ML — HIGH (ref 0–3.8)
CK MB CFR SERPL CALC: 60.1 NG/ML — HIGH (ref 0–3.8)
CK SERPL-CCNC: 463 U/L — HIGH (ref 25–170)
CK SERPL-CCNC: 582 U/L — HIGH (ref 25–170)
CK SERPL-CCNC: 734 U/L — HIGH (ref 25–170)
CO2 SERPL-SCNC: 24 MMOL/L — SIGNIFICANT CHANGE UP (ref 22–31)
CREAT SERPL-MCNC: 0.71 MG/DL — SIGNIFICANT CHANGE UP (ref 0.5–1.3)
EOSINOPHIL # BLD AUTO: 0.1 K/UL — SIGNIFICANT CHANGE UP (ref 0–0.5)
EOSINOPHIL NFR BLD AUTO: 1.7 % — SIGNIFICANT CHANGE UP (ref 0–6)
GLUCOSE SERPL-MCNC: 129 MG/DL — HIGH (ref 70–99)
HBA1C BLD-MCNC: 6.5 % — HIGH (ref 4–5.6)
HCT VFR BLD CALC: 41.2 % — SIGNIFICANT CHANGE UP (ref 34.5–45)
HGB BLD-MCNC: 14.1 G/DL — SIGNIFICANT CHANGE UP (ref 11.5–15.5)
LYMPHOCYTES # BLD AUTO: 1.3 K/UL — SIGNIFICANT CHANGE UP (ref 1–3.3)
LYMPHOCYTES # BLD AUTO: 17.3 % — SIGNIFICANT CHANGE UP (ref 13–44)
MCHC RBC-ENTMCNC: 31.9 PG — SIGNIFICANT CHANGE UP (ref 27–34)
MCHC RBC-ENTMCNC: 34.4 GM/DL — SIGNIFICANT CHANGE UP (ref 32–36)
MCV RBC AUTO: 92.8 FL — SIGNIFICANT CHANGE UP (ref 80–100)
MONOCYTES # BLD AUTO: 0.7 K/UL — SIGNIFICANT CHANGE UP (ref 0–0.9)
MONOCYTES NFR BLD AUTO: 8.9 % — SIGNIFICANT CHANGE UP (ref 2–14)
NEUTROPHILS # BLD AUTO: 5.6 K/UL — SIGNIFICANT CHANGE UP (ref 1.8–7.4)
NEUTROPHILS NFR BLD AUTO: 72 % — SIGNIFICANT CHANGE UP (ref 43–77)
PLATELET # BLD AUTO: 157 K/UL — SIGNIFICANT CHANGE UP (ref 150–400)
POTASSIUM SERPL-MCNC: 3.7 MMOL/L — SIGNIFICANT CHANGE UP (ref 3.5–5.3)
POTASSIUM SERPL-SCNC: 3.7 MMOL/L — SIGNIFICANT CHANGE UP (ref 3.5–5.3)
RBC # BLD: 4.43 M/UL — SIGNIFICANT CHANGE UP (ref 3.8–5.2)
RBC # FLD: 12 % — SIGNIFICANT CHANGE UP (ref 10.3–14.5)
SODIUM SERPL-SCNC: 139 MMOL/L — SIGNIFICANT CHANGE UP (ref 135–145)
TROPONIN T, HIGH SENSITIVITY RESULT: 2554 NG/L — HIGH (ref 0–51)
TROPONIN T, HIGH SENSITIVITY RESULT: 2984 NG/L — HIGH (ref 0–51)
TROPONIN T, HIGH SENSITIVITY RESULT: 3510 NG/L — HIGH (ref 0–51)
WBC # BLD: 7.8 K/UL — SIGNIFICANT CHANGE UP (ref 3.8–10.5)
WBC # FLD AUTO: 7.8 K/UL — SIGNIFICANT CHANGE UP (ref 3.8–10.5)

## 2019-04-30 PROCEDURE — 99223 1ST HOSP IP/OBS HIGH 75: CPT

## 2019-04-30 PROCEDURE — 93306 TTE W/DOPPLER COMPLETE: CPT | Mod: 26

## 2019-04-30 PROCEDURE — 99231 SBSQ HOSP IP/OBS SF/LOW 25: CPT

## 2019-04-30 RX ORDER — METOPROLOL TARTRATE 50 MG
25 TABLET ORAL DAILY
Qty: 0 | Refills: 0 | Status: DISCONTINUED | OUTPATIENT
Start: 2019-04-30 | End: 2019-05-01

## 2019-04-30 RX ORDER — TICAGRELOR 90 MG/1
1 TABLET ORAL
Qty: 60 | Refills: 0
Start: 2019-04-30 | End: 2019-05-29

## 2019-04-30 RX ORDER — ASPIRIN/CALCIUM CARB/MAGNESIUM 324 MG
1 TABLET ORAL
Qty: 0 | Refills: 0 | DISCHARGE
Start: 2019-04-30

## 2019-04-30 RX ORDER — METOPROLOL TARTRATE 50 MG
1 TABLET ORAL
Qty: 30 | Refills: 0
Start: 2019-04-30 | End: 2019-05-29

## 2019-04-30 RX ADMIN — Medication 2000 UNIT(S): at 09:52

## 2019-04-30 RX ADMIN — Medication 1 TABLET(S): at 09:51

## 2019-04-30 RX ADMIN — TICAGRELOR 90 MILLIGRAM(S): 90 TABLET ORAL at 18:05

## 2019-04-30 RX ADMIN — Medication 25 MILLIGRAM(S): at 09:51

## 2019-04-30 RX ADMIN — Medication 81 MILLIGRAM(S): at 05:26

## 2019-04-30 RX ADMIN — Medication 500 MILLIGRAM(S): at 09:51

## 2019-04-30 RX ADMIN — TICAGRELOR 90 MILLIGRAM(S): 90 TABLET ORAL at 05:26

## 2019-04-30 NOTE — DISCHARGE NOTE PROVIDER - NSDCCPTREATMENT_GEN_ALL_CORE_FT
PRINCIPAL PROCEDURE  Procedure: Placement of coronary artery stent  Findings and Treatment: stents to the mid circ and mid RCA

## 2019-04-30 NOTE — PROGRESS NOTE ADULT - SUBJECTIVE AND OBJECTIVE BOX
1- Hour Post Removal of Right Ulnar Band Assessment of Access Site    Vital signs are stable, neuro-vascular status of the upper extremities is intact, stable and there is no evidence of hematoma on the right upper extremity.     Complications: NONE    Comments:

## 2019-04-30 NOTE — DISCHARGE NOTE PROVIDER - NSDCPNSUBOBJ_GEN_ALL_CORE
Patient is a 85y old  Female who presents with a chief complaint of chest pain, NSTEMI (2019 00:53)                    Allergies        adhesives (Other)    latex (Rash)    No Known Drug Allergies        Intolerances        statins (Muscle Pain)            Medications:    ascorbic acid 500 milliGRAM(s) Oral daily    aspirin enteric coated 81 milliGRAM(s) Oral daily    cholecalciferol 2000 Unit(s) Oral daily    lactobacillus acidophilus 1 Tablet(s) Oral daily    ticagrelor 90 milliGRAM(s) Oral two times a day            Vitals:    T(C): 36.7 (19 @ 21:20), Max: 36.7 (19 @ 16:04)    HR: 59 (19 @ 01:20) (59 - 86)    BP: 161/64 (19 @ 01:20) (108/76 - 178/80)    BP(mean): --    RR: 17 (19 @ 01:20) (14 - 17)    SpO2: 97% (19 @ 01:20) (95% - 100%)    Wt(kg): --    Daily Height in cm: 157.48 (2019 21:21)      Daily Weight in k.5 (2019 21:21)    I&O's Summary        2019 07:01  -  2019 02:32    --------------------------------------------------------    IN: 480 mL / OUT: 200 mL / NET: 280 mL                    Physical Exam:    Appearance: Normal    Eyes: PERRL, EOMI    HENT: Normal oral mus[place]a, NC/AT    Cardiovascular: S1S2, RRR, No M/R/G, no JVD, No Lower extremity edema    Procedural Access Site: No hematoma, Non-tender to palpation, 2+ pulse, No bruit, No Ecchymosis    Respiratory: Clear to auscultation bilaterally    Gastrointestinal: Soft, Non tender, Normal Bowel Sounds    Musculoskeletal: No clubbing, No joint deformity     Neurologic: Non-focal    Lymphatic: No lymphadenopathy    Psychiatry: AAOx3, Mood & affect appropriate    Skin: No rashes, No ecchymoses, No cyanosis                139  |  102  |  14    ----------------------------<  129<H>    3.7   |  24  |  0.71        Ca    9.5      2019 00:53        TPro  7.4  /  Alb  4.1  /  TBili  0.5  /  DBili  x   /  AST  181<H>  /  ALT  39  /  AlkPhos  79          PT/INR - ( 2019 16:40 )   PT: 15.8 sec;   INR: 1.38 ratio               PTT - ( 2019 16:40 )  PTT:30.3 sec    CARDIAC MARKERS ( 2019 16:40 )    92.500 ng/mL / x     / x     / x     / 180.6 ng/mL                Lipid panel     Hgb A1c                             14.1     7.8   )-----------( 157      ( 2019 00:54 )               41.2                 ECG: SR 68 bpm        Cath: one mid circ stent, one mid RCA stent        Imaging: Head CT negative for infarct, bleed, mass        Interpretation of Telemetry: Patient is a 85y old  Female who presents with a chief complaint of chest pain, NSTEMI (30 Apr 2019 00:53)                    Allergies        adhesives (Other)    latex (Rash)    No Known Drug Allergies        Intolerances        statins (Muscle Pain)            Medications:    ascorbic acid 500 milliGRAM(s) Oral daily    aspirin enteric coated 81 milliGRAM(s) Oral daily    cholecalciferol 2000 Unit(s) Oral daily    lactobacillus acidophilus 1 Tablet(s) Oral daily    metoprolol succinate ER 25 milliGRAM(s) Oral daily    ticagrelor 90 milliGRAM(s) Oral two times a day            Vitals:    T(C): 36.7 (04-30-19 @ 21:16), Max: 36.8 (04-30-19 @ 05:19)    HR: 73 (04-30-19 @ 21:16) (73 - 92)    BP: 127/54 (04-30-19 @ 21:16) (112/57 - 144/69)    BP(mean): --    RR: 16 (04-30-19 @ 21:16) (16 - 18)    SpO2: 98% (04-30-19 @ 21:16) (97% - 98%)    Wt(kg): --    Daily       Daily     I&O's Summary        29 Apr 2019 07:01  -  30 Apr 2019 07:00    --------------------------------------------------------    IN: 600 mL / OUT: 200 mL / NET: 400 mL        30 Apr 2019 07:01  -  01 May 2019 03:50    --------------------------------------------------------    IN: 720 mL / OUT: 0 mL / NET: 720 mL                    Physical Exam:    Appearance: Normal    Eyes: PERRL, EOMI    HENT: Normal oral mus[place], NC/AT    Cardiovascular: S1S2, RRR, No M/R/G, no JVD, No Lower extremity edema    Procedural Access Site: No hematoma, Non-tender to palpation, 2+ pulse, No bruit, No Ecchymosis    Respiratory: Clear to auscultation bilaterally    Gastrointestinal: Soft, Non tender, Normal Bowel Sounds    Musculoskeletal: No clubbing, No joint deformity     Neurologic: Non-focal    Lymphatic: No lymphadenopathy    Psychiatry: AAOx3, Mood & affect appropriate    Skin: No rashes, No ecchymoses, No cyanosis        05-01        140  |  104  |  23    ----------------------------<  101<H>    4.4   |  22  |  0.94        Ca    9.5      01 May 2019 00:26        TPro  7.4  /  Alb  4.1  /  TBili  0.5  /  DBili  x   /  AST  181<H>  /  ALT  39  /  AlkPhos  79  04-29        PT/INR - ( 29 Apr 2019 16:40 )   PT: 15.8 sec;   INR: 1.38 ratio               PTT - ( 29 Apr 2019 16:40 )  PTT:30.3 sec    CARDIAC MARKERS ( 01 May 2019 00:26 )    x     / x     / 368 U/L / x     / 15.6 ng/mL    CARDIAC MARKERS ( 30 Apr 2019 19:34 )    x     / x     / 463 U/L / x     / 24.5 ng/mL    CARDIAC MARKERS ( 30 Apr 2019 13:26 )    x     / x     / 582 U/L / x     / 40.8 ng/mL    CARDIAC MARKERS ( 30 Apr 2019 07:33 )    x     / x     / 734 U/L / x     / 60.1 ng/mL    CARDIAC MARKERS ( 29 Apr 2019 16:40 )    92.500 ng/mL / x     / x     / x     / 180.6 ng/mL                Lipid panel     Hgb A1c Hemoglobin A1C, Whole Blood: 6.5 % (04-30 @ 05:13)                                13.7     9.8   )-----------( 169      ( 01 May 2019 00:26 )               40.0                 ECG: SR 68 bpm        Cath: one mid circ stent, one mid RCA stent        Imaging: Head CT negative for infarct, bleed, mass        Interpretation of Telemetry:

## 2019-04-30 NOTE — DISCHARGE NOTE PROVIDER - CARE PROVIDERS DIRECT ADDRESSES
[email or web address]

## 2019-04-30 NOTE — DISCHARGE NOTE PROVIDER - CARE PROVIDER_API CALL
Miguel Riley)  Cardiovascular Disease  [address] [place] [address]  Phone: [phone]  Fax: [phone]  Follow Up Time:     Mo Ibarra)  Cardiac Electrophysiology; Cardiology; Internal Medicine  [address], [place] [id number]  Phone: [phone]  Fax: [phone]  Follow Up Time:

## 2019-04-30 NOTE — DISCHARGE NOTE PROVIDER - HOSPITAL COURSE
HPI:    84 y/o female, recent loop recorder placed by Dr. DHARMESH Ibarra, pmhx AAA, HTN, syncope, presents to [facility] with c/o chest pain. Positive for NSTEMI, seen by Dr. VIC Riley at [facility], transferred to [facility] for cardiac cath on 4/29/19. Stents placed to the mid RCA and mid circumflex on 4/29/19 via right ulnar artery access. Post procedure patient c/o inability to move left arm or left leg, evaluated by Neurology team, sent for STAT head CT without contrast. Head CT negative for infarct, unchanged from prior CT, patient currently A&O x3 with full ROM in all extremities, no c/o headache, blurred vision, dizziness, N/V. (29 Apr 2019 21:21)        4/29 cardiac cath with stents to the mid circ and mid RCA. Right ulnar cath site without swelling, bleeding.

## 2019-04-30 NOTE — CONSULT NOTE ADULT - SUBJECTIVE AND OBJECTIVE BOX
[facility] - Melania Gallo Grossman, Osvaldo, Gerardo, Giovana Burroughs  Office Number: [phone]    Initial Consult Note    CHIEF COMPLAINT: Patient is a 85y old  Female who presents with a chief complaint of chest pain, NSTEMI (30 Apr 2019 00:53)      HPI:  84 y/o female, recent loop recorder placed by Dr. DHARMESH Ibarra, pmhx AAA, HTN, syncope, presents to [facility] with c/o chest pain. Positive for NSTEMI, seen by Dr. VIC Riley at [facility], transferred to [facility] for cardiac cath on 4/29/19. Stents placed to the mid RCA and mid circumflex on 4/29/19 via right ulnar artery access. Post procedure patient c/o inability to move left arm or left leg, evaluated by Neurology team, sent for STAT head CT without contrast. Head CT negative for infarct, unchanged from prior CT, patient currently A&O x3 with full ROM in all extremities, no c/o headache, blurred vision, dizziness, N/V. (29 Apr 2019 21:21)    She is feeling well this morning, and is now s/p PCI to mid RCA and mid LCx.  Overnight with left sided weakness, CT head negative. Neurologic symptoms are resolved this morning.      PAST MEDICAL & SURGICAL HISTORY:  History of loop recorder: placed by Dr. DHARMESH Ibarra  Syncope, unspecified syncope type  AAA (abdominal aortic aneurysm)  Coronary artery disease of native artery of native heart with stable angina pectoris  HTN (hypertension)  Back pain, chronic: occasional lower back pain  Benign paroxysmal vertigo of right ear: history of right ear abscess  Bladder cancer: dx 2012 treated with BCG  Status post cataract extraction: OD  History of cardiac monitoring: loop monitor  S/P cystoscopy  S/P D&C  S/P breast biopsy, left      SOCIAL HISTORY:  No tobacco, ethanol, or drug abuse.    FAMILY HISTORY:  Family history of MI (myocardial infarction) (Sibling): at 59 years of age  Family history of hypertension  Family history of diabetes mellitus    No family history of acute MI or sudden cardiac death.    MEDICATIONS  (STANDING):  ascorbic acid 500 milliGRAM(s) Oral daily  aspirin enteric coated 81 milliGRAM(s) Oral daily  cholecalciferol 2000 Unit(s) Oral daily  lactobacillus acidophilus 1 Tablet(s) Oral daily  metoprolol succinate ER 25 milliGRAM(s) Oral daily  ticagrelor 90 milliGRAM(s) Oral two times a day    MEDICATIONS  (PRN):      Allergies    adhesives (Other)  latex (Rash)  No Known Drug Allergies    Intolerances    statins (Muscle Pain)      REVIEW OF SYSTEMS:    CONSTITUTIONAL: No weakness, fevers or chills  EYES/ENT: No visual changes;  No vertigo or throat pain   NECK: No pain or stiffness  RESPIRATORY: No cough, wheezing, hemoptysis; No shortness of breath  CARDIOVASCULAR: + chest pain, no palpitations  GASTROINTESTINAL: No abdominal pain. No nausea, vomiting, or hematemesis; No diarrhea or constipation. No melena or hematochezia.  GENITOURINARY: No dysuria, frequency or hematuria  NEUROLOGICAL: No numbness or weakness  SKIN: No itching or rash  All other review of systems is negative unless indicated above    VITAL SIGNS:   Vital Signs Last 24 Hrs  T(C): 36.8 (30 Apr 2019 05:19), Max: 36.8 (30 Apr 2019 05:19)  T(F): 98.2 (30 Apr 2019 05:19), Max: 98.2 (30 Apr 2019 05:19)  HR: 92 (30 Apr 2019 05:19) (59 - 92)  BP: 112/57 (30 Apr 2019 05:19) (108/76 - 178/80)  BP(mean): --  RR: 18 (30 Apr 2019 05:19) (14 - 18)  SpO2: 97% (30 Apr 2019 05:19) (95% - 100%)    I&O's Summary    29 Apr 2019 07:01  -  30 Apr 2019 07:00  --------------------------------------------------------  IN: 600 mL / OUT: 200 mL / NET: 400 mL        On Exam:    Constitutional: NAD, alert and oriented x 3  Lungs:  Non-labored, breath sounds are clear bilaterally, No wheezing, rales or rhonchi  Cardiovascular: RRR.  S1 and S2 positive.  No murmurs, rubs, gallops or clicks  Gastrointestinal: Bowel Sounds present, soft, nontender.   Lymph: No peripheral edema. No cervical lymphadenopathy.  Neurological: Alert, no focal deficits  Skin: No rashes or ulcers; some ecchymosis at right ulnar site   Psych:  Mood & affect appropriate.    LABS: All Labs Reviewed:                        14.1   7.8   )-----------( 157      ( 30 Apr 2019 00:54 )             41.2                         14.6   8.8   )-----------( 158      ( 29 Apr 2019 20:32 )             43.1                         13.8   8.50  )-----------( 191      ( 29 Apr 2019 16:40 )             42.1     30 Apr 2019 00:53    139    |  102    |  14     ----------------------------<  129    3.7     |  24     |  0.71   29 Apr 2019 20:32    138    |  101    |  12     ----------------------------<  103    6.2     |  23     |  0.70   29 Apr 2019 16:40    141    |  106    |  12     ----------------------------<  133    3.8     |  28     |  0.86     Ca    9.5        30 Apr 2019 00:53  Ca    9.6        29 Apr 2019 20:32  Ca    9.0        29 Apr 2019 16:40    TPro  7.4    /  Alb  4.1    /  TBili  0.5    /  DBili  x      /  AST  181    /  ALT  39     /  AlkPhos  79     29 Apr 2019 20:32  TPro  7.7    /  Alb  3.7    /  TBili  0.6    /  DBili  x      /  AST  301    /  ALT  48     /  AlkPhos  93     29 Apr 2019 16:40    PT/INR - ( 29 Apr 2019 16:40 )   PT: 15.8 sec;   INR: 1.38 ratio         PTT - ( 29 Apr 2019 16:40 )  PTT:30.3 sec  CARDIAC MARKERS ( 29 Apr 2019 16:40 )  92.500 ng/mL / x     / x     / x     / 180.6 ng/mL      Blood Culture:         RADIOLOGY:    EKG: [facility] - Melania Gallo Grossman, Osvaldo, Gerardo, Giovana Burroughs  Office Number: [phone]    Initial Consult Note    CHIEF COMPLAINT: Patient is a 85y old  Female who presents with a chief complaint of chest pain, NSTEMI (30 Apr 2019 00:53)      HPI:  84 y/o female, recent loop recorder placed by Dr. DHARMESH Ibarra, pmhx AAA, HTN, syncope, presents to [facility] with c/o chest pain. Positive for NSTEMI, seen by Dr. VIC Riley at [facility], transferred to [facility] for cardiac cath on 4/29/19. Stents placed to the mid RCA and mid circumflex on 4/29/19 via right ulnar artery access. Post procedure patient c/o inability to move left arm or left leg, evaluated by Neurology team, sent for STAT head CT without contrast. Head CT negative for infarct, unchanged from prior CT, patient currently A&O x3 with full ROM in all extremities, no c/o headache, blurred vision, dizziness, N/V. (29 Apr 2019 21:21)    She is feeling well this morning, and is now s/p PCI to mid RCA and mid LCx.  Overnight with left sided weakness, CT head negative. Neurologic symptoms are resolved this morning.      PAST MEDICAL & SURGICAL HISTORY:  History of loop recorder: placed by Dr. DHARMESH Ibarra  Syncope, unspecified syncope type  AAA (abdominal aortic aneurysm)  Coronary artery disease of native artery of native heart with stable angina pectoris  HTN (hypertension)  Back pain, chronic: occasional lower back pain  Benign paroxysmal vertigo of right ear: history of right ear abscess  Bladder cancer: dx 2012 treated with BCG  Status post cataract extraction: OD  History of cardiac monitoring: loop monitor  S/P cystoscopy  S/P D&C  S/P breast biopsy, left      SOCIAL HISTORY:  No tobacco, ethanol, or drug abuse.    FAMILY HISTORY:  Family history of MI (myocardial infarction) (Sibling): at 59 years of age  Family history of hypertension  Family history of diabetes mellitus    No family history of acute MI or sudden cardiac death.    MEDICATIONS  (STANDING):  ascorbic acid 500 milliGRAM(s) Oral daily  aspirin enteric coated 81 milliGRAM(s) Oral daily  cholecalciferol 2000 Unit(s) Oral daily  lactobacillus acidophilus 1 Tablet(s) Oral daily  metoprolol succinate ER 25 milliGRAM(s) Oral daily  ticagrelor 90 milliGRAM(s) Oral two times a day    MEDICATIONS  (PRN):      Allergies    adhesives (Other)  latex (Rash)  No Known Drug Allergies    Intolerances    statins (Muscle Pain)      REVIEW OF SYSTEMS:    CONSTITUTIONAL: No weakness, fevers or chills  EYES/ENT: No visual changes;  No vertigo or throat pain   NECK: No pain or stiffness  RESPIRATORY: No cough, wheezing, hemoptysis; No shortness of breath  CARDIOVASCULAR: + chest pain, no palpitations  GASTROINTESTINAL: No abdominal pain. No nausea, vomiting, or hematemesis; No diarrhea or constipation. No melena or hematochezia.  GENITOURINARY: No dysuria, frequency or hematuria  NEUROLOGICAL: No numbness or weakness  SKIN: No itching or rash  All other review of systems is negative unless indicated above    VITAL SIGNS:   Vital Signs Last 24 Hrs  T(C): 36.8 (30 Apr 2019 05:19), Max: 36.8 (30 Apr 2019 05:19)  T(F): 98.2 (30 Apr 2019 05:19), Max: 98.2 (30 Apr 2019 05:19)  HR: 92 (30 Apr 2019 05:19) (59 - 92)  BP: 112/57 (30 Apr 2019 05:19) (108/76 - 178/80)  BP(mean): --  RR: 18 (30 Apr 2019 05:19) (14 - 18)  SpO2: 97% (30 Apr 2019 05:19) (95% - 100%)    I&O's Summary    29 Apr 2019 07:01  -  30 Apr 2019 07:00  --------------------------------------------------------  IN: 600 mL / OUT: 200 mL / NET: 400 mL        On Exam:    Constitutional: NAD, alert and oriented x 3  Lungs:  Non-labored, breath sounds are clear bilaterally, No wheezing, rales or rhonchi  Cardiovascular: RRR.  S1 and S2 positive.  No murmurs, rubs, gallops or clicks  Gastrointestinal: Bowel Sounds present, soft, nontender.   Lymph: No peripheral edema. No cervical lymphadenopathy.  Neurological: Alert, no focal deficits  Skin: No rashes or ulcers; some ecchymosis at right ulnar site   Psych:  Mood & affect appropriate.    LABS: All Labs Reviewed:                        14.1   7.8   )-----------( 157      ( 30 Apr 2019 00:54 )             41.2                         14.6   8.8   )-----------( 158      ( 29 Apr 2019 20:32 )             43.1                         13.8   8.50  )-----------( 191      ( 29 Apr 2019 16:40 )             42.1     30 Apr 2019 00:53    139    |  102    |  14     ----------------------------<  129    3.7     |  24     |  0.71   29 Apr 2019 20:32    138    |  101    |  12     ----------------------------<  103    6.2     |  23     |  0.70   29 Apr 2019 16:40    141    |  106    |  12     ----------------------------<  133    3.8     |  28     |  0.86     Ca    9.5        30 Apr 2019 00:53  Ca    9.6        29 Apr 2019 20:32  Ca    9.0        29 Apr 2019 16:40    TPro  7.4    /  Alb  4.1    /  TBili  0.5    /  DBili  x      /  AST  181    /  ALT  39     /  AlkPhos  79     29 Apr 2019 20:32  TPro  7.7    /  Alb  3.7    /  TBili  0.6    /  DBili  x      /  AST  301    /  ALT  48     /  AlkPhos  93     29 Apr 2019 16:40    PT/INR - ( 29 Apr 2019 16:40 )   PT: 15.8 sec;   INR: 1.38 ratio         PTT - ( 29 Apr 2019 16:40 )  PTT:30.3 sec  CARDIAC MARKERS ( 29 Apr 2019 16:40 )  92.500 ng/mL / x     / x     / x     / 180.6 ng/mL      Blood Culture:         RADIOLOGY:    EKG: sr with inf-lat t wave abn

## 2019-04-30 NOTE — CONSULT NOTE ADULT - ASSESSMENT
Mrs. Neri is an 85 year old female with HTN here with an NSTEMI. Overnight, with some left sided weakness that has resolved.  She is now s/p PCI to RCA and LCx    - Feeling well this morning and No events on telemetry  - Continue with ASA and Brilinta  - She has been unable to tolerate a statin in the past, and she should be started on a PSK9 inhibitor as outpatient  - Significant CE leak noted at [place]. Would trend until peak here  - Echocardiogram  - Start Toprol XL 25 mg po daily  - Ace-inhibitor to be started tomorrow  - Given significant CE leak and NSTEMI, would monitor for 24 hours and plan for d/c home tomorrow Mrs. Neri is an 85 year old female with HTN here with an NSTEMI. Overnight, with some left sided weakness that has resolved.  She is now s/p PCI to RCA and LCx    - Feeling well this morning and No events on telemetry  - Continue with ASA and Brilinta  - She has been unable to tolerate a statin in the past, and she should be started on a PCSK9 inhibitor as outpatient  - Significant CE leak noted at [place]. Would trend until peak here  - Echocardiogram  - Start Toprol XL 25 mg po daily  - Ace-inhibitor to be started tomorrow  - Given significant CE leak and NSTEMI, would monitor for 24 hours and plan for d/c home tomorrow

## 2019-04-30 NOTE — DISCHARGE NOTE NURSING/CASE MANAGEMENT/SOCIAL WORK - NSDCPEPTSTRK_GEN_ALL_CORE
Need for follow up after discharge/Call 911 for stroke/Stroke support groups for patients, families, and friends/Stroke education booklet/Stroke warning signs and symptoms/Risk factors for stroke/Signs and symptoms of stroke/Prescribed medications

## 2019-04-30 NOTE — PROVIDER CONTACT NOTE (CRITICAL VALUE NOTIFICATION) - ASSESSMENT
Pt is alert and oriented x4. vss. pt resting in bed comfortably, speaking on telephone.
A&Ox3, VSS, asymptomatic, denies chest pain, no sob

## 2019-04-30 NOTE — DISCHARGE NOTE PROVIDER - NSDCCPCAREPLAN_GEN_ALL_CORE_FT
PRINCIPAL DISCHARGE DIAGNOSIS  Diagnosis: CAD (coronary artery disease), native coronary artery  Assessment and Plan of Treatment: Do not stop you Aspirin or Brilinta unless instructed to do so by your cardiologist, they help keep your stented arteries open.   No heavy lifting or pushing/pulling with procedure arm for 2 weeks. No driving for 2 days. You may shower 24 hours following the procedure but avoid baths/swimming for 1 week. Check your wrist site for bleeding and/or swelling daily following procedure and call your doctor immediately if it occurs or if you experience increased pain at the site. Follow up with your cardiologist in 1-2 weeks. You may call [facility] if you have any questions/concerns regarding your procedure [phone].      SECONDARY DISCHARGE DIAGNOSES  Diagnosis: HTN (hypertension)  Assessment and Plan of Treatment: Continue with your blood pressure medications; eat a heart healthy diet with low salt diet; exercise regularly (consult with your physician or cardiologist first); maintain a heart healthy weight; if you smoke - quit (A resource to help you stop smoking is the [facility] – phone number 978-686-0620.); include healthy ways to manage stress. Continue to follow with your primary care physician or cardiologist.

## 2019-04-30 NOTE — CHART NOTE - NSCHARTNOTEFT_GEN_A_CORE
All Neurological symptoms resolved, patient ambulating on unit independently with steady gait.  Physical therapy not indicated at this time.

## 2019-04-30 NOTE — DISCHARGE NOTE NURSING/CASE MANAGEMENT/SOCIAL WORK - NSDCDPATPORTLINK_GEN_ALL_CORE
You can access the [facility] Patient Portal, offered by [facility], by registering with the following website: [email or web address]

## 2019-05-01 VITALS — HEART RATE: 74 BPM | SYSTOLIC BLOOD PRESSURE: 123 MMHG | DIASTOLIC BLOOD PRESSURE: 61 MMHG

## 2019-05-01 PROBLEM — I71.4 ABDOMINAL AORTIC ANEURYSM, WITHOUT RUPTURE: Chronic | Status: ACTIVE | Noted: 2019-04-29

## 2019-05-01 PROBLEM — I10 ESSENTIAL (PRIMARY) HYPERTENSION: Chronic | Status: ACTIVE | Noted: 2019-04-29

## 2019-05-01 PROBLEM — I25.118 ATHEROSCLEROTIC HEART DISEASE OF NATIVE CORONARY ARTERY WITH OTHER FORMS OF ANGINA PECTORIS: Chronic | Status: ACTIVE | Noted: 2019-04-29

## 2019-05-01 LAB
ANION GAP SERPL CALC-SCNC: 14 MMOL/L — SIGNIFICANT CHANGE UP (ref 5–17)
BASOPHILS # BLD AUTO: 0 K/UL — SIGNIFICANT CHANGE UP (ref 0–0.2)
BASOPHILS NFR BLD AUTO: 0.1 % — SIGNIFICANT CHANGE UP (ref 0–2)
BUN SERPL-MCNC: 23 MG/DL — SIGNIFICANT CHANGE UP (ref 7–23)
CALCIUM SERPL-MCNC: 9.5 MG/DL — SIGNIFICANT CHANGE UP (ref 8.4–10.5)
CHLORIDE SERPL-SCNC: 104 MMOL/L — SIGNIFICANT CHANGE UP (ref 96–108)
CHOLEST SERPL-MCNC: 206 MG/DL — HIGH (ref 10–199)
CK MB BLD-MCNC: 4.2 % — HIGH (ref 0–3.5)
CK MB CFR SERPL CALC: 15.6 NG/ML — HIGH (ref 0–3.8)
CK SERPL-CCNC: 303 U/L — HIGH (ref 25–170)
CK SERPL-CCNC: 368 U/L — HIGH (ref 25–170)
CO2 SERPL-SCNC: 22 MMOL/L — SIGNIFICANT CHANGE UP (ref 22–31)
CREAT SERPL-MCNC: 0.94 MG/DL — SIGNIFICANT CHANGE UP (ref 0.5–1.3)
EOSINOPHIL # BLD AUTO: 0.2 K/UL — SIGNIFICANT CHANGE UP (ref 0–0.5)
EOSINOPHIL NFR BLD AUTO: 2 % — SIGNIFICANT CHANGE UP (ref 0–6)
GLUCOSE SERPL-MCNC: 101 MG/DL — HIGH (ref 70–99)
HCT VFR BLD CALC: 40 % — SIGNIFICANT CHANGE UP (ref 34.5–45)
HDLC SERPL-MCNC: 51 MG/DL — SIGNIFICANT CHANGE UP
HGB BLD-MCNC: 13.7 G/DL — SIGNIFICANT CHANGE UP (ref 11.5–15.5)
LIPID PNL WITH DIRECT LDL SERPL: 134 MG/DL — HIGH
LYMPHOCYTES # BLD AUTO: 2 K/UL — SIGNIFICANT CHANGE UP (ref 1–3.3)
LYMPHOCYTES # BLD AUTO: 20.5 % — SIGNIFICANT CHANGE UP (ref 13–44)
MCHC RBC-ENTMCNC: 31.6 PG — SIGNIFICANT CHANGE UP (ref 27–34)
MCHC RBC-ENTMCNC: 34.2 GM/DL — SIGNIFICANT CHANGE UP (ref 32–36)
MCV RBC AUTO: 92.4 FL — SIGNIFICANT CHANGE UP (ref 80–100)
MONOCYTES # BLD AUTO: 0.9 K/UL — SIGNIFICANT CHANGE UP (ref 0–0.9)
MONOCYTES NFR BLD AUTO: 9 % — SIGNIFICANT CHANGE UP (ref 2–14)
NEUTROPHILS # BLD AUTO: 6.7 K/UL — SIGNIFICANT CHANGE UP (ref 1.8–7.4)
NEUTROPHILS NFR BLD AUTO: 68.4 % — SIGNIFICANT CHANGE UP (ref 43–77)
PLATELET # BLD AUTO: 169 K/UL — SIGNIFICANT CHANGE UP (ref 150–400)
POTASSIUM SERPL-MCNC: 4.4 MMOL/L — SIGNIFICANT CHANGE UP (ref 3.5–5.3)
POTASSIUM SERPL-SCNC: 4.4 MMOL/L — SIGNIFICANT CHANGE UP (ref 3.5–5.3)
RBC # BLD: 4.32 M/UL — SIGNIFICANT CHANGE UP (ref 3.8–5.2)
RBC # FLD: 12 % — SIGNIFICANT CHANGE UP (ref 10.3–14.5)
SODIUM SERPL-SCNC: 140 MMOL/L — SIGNIFICANT CHANGE UP (ref 135–145)
TOTAL CHOLESTEROL/HDL RATIO MEASUREMENT: 4 RATIO — SIGNIFICANT CHANGE UP (ref 3.3–7.1)
TRIGL SERPL-MCNC: 105 MG/DL — SIGNIFICANT CHANGE UP (ref 10–149)
TROPONIN T, HIGH SENSITIVITY RESULT: 2943 NG/L — HIGH (ref 0–51)
TROPONIN T, HIGH SENSITIVITY RESULT: 3063 NG/L — HIGH (ref 0–51)
WBC # BLD: 9.8 K/UL — SIGNIFICANT CHANGE UP (ref 3.8–10.5)
WBC # FLD AUTO: 9.8 K/UL — SIGNIFICANT CHANGE UP (ref 3.8–10.5)

## 2019-05-01 PROCEDURE — 70450 CT HEAD/BRAIN W/O DYE: CPT

## 2019-05-01 PROCEDURE — 99152 MOD SED SAME PHYS/QHP 5/>YRS: CPT

## 2019-05-01 PROCEDURE — C9600: CPT | Mod: RC

## 2019-05-01 PROCEDURE — 85027 COMPLETE CBC AUTOMATED: CPT

## 2019-05-01 PROCEDURE — 80053 COMPREHEN METABOLIC PANEL: CPT

## 2019-05-01 PROCEDURE — 80061 LIPID PANEL: CPT

## 2019-05-01 PROCEDURE — 83036 HEMOGLOBIN GLYCOSYLATED A1C: CPT

## 2019-05-01 PROCEDURE — 93454 CORONARY ARTERY ANGIO S&I: CPT | Mod: 59

## 2019-05-01 PROCEDURE — 93005 ELECTROCARDIOGRAM TRACING: CPT

## 2019-05-01 PROCEDURE — C1769: CPT

## 2019-05-01 PROCEDURE — 84484 ASSAY OF TROPONIN QUANT: CPT

## 2019-05-01 PROCEDURE — C1894: CPT

## 2019-05-01 PROCEDURE — 99153 MOD SED SAME PHYS/QHP EA: CPT

## 2019-05-01 PROCEDURE — C1874: CPT

## 2019-05-01 PROCEDURE — 82550 ASSAY OF CK (CPK): CPT

## 2019-05-01 PROCEDURE — C9606: CPT | Mod: LC

## 2019-05-01 PROCEDURE — 82553 CREATINE MB FRACTION: CPT

## 2019-05-01 PROCEDURE — 82962 GLUCOSE BLOOD TEST: CPT

## 2019-05-01 PROCEDURE — 99239 HOSP IP/OBS DSCHRG MGMT >30: CPT

## 2019-05-01 PROCEDURE — 93306 TTE W/DOPPLER COMPLETE: CPT

## 2019-05-01 PROCEDURE — C1887: CPT

## 2019-05-01 PROCEDURE — 80048 BASIC METABOLIC PNL TOTAL CA: CPT

## 2019-05-01 RX ORDER — LISINOPRIL 2.5 MG/1
1 TABLET ORAL
Qty: 30 | Refills: 0
Start: 2019-05-01 | End: 2019-05-30

## 2019-05-01 RX ORDER — LISINOPRIL 2.5 MG/1
2.5 TABLET ORAL DAILY
Qty: 0 | Refills: 0 | Status: DISCONTINUED | OUTPATIENT
Start: 2019-05-01 | End: 2019-05-01

## 2019-05-01 RX ADMIN — Medication 2000 UNIT(S): at 07:35

## 2019-05-01 RX ADMIN — Medication 81 MILLIGRAM(S): at 05:20

## 2019-05-01 RX ADMIN — Medication 500 MILLIGRAM(S): at 07:35

## 2019-05-01 RX ADMIN — Medication 1 TABLET(S): at 07:35

## 2019-05-01 RX ADMIN — TICAGRELOR 90 MILLIGRAM(S): 90 TABLET ORAL at 05:20

## 2019-05-01 RX ADMIN — LISINOPRIL 2.5 MILLIGRAM(S): 2.5 TABLET ORAL at 07:35

## 2019-05-01 RX ADMIN — Medication 25 MILLIGRAM(S): at 05:20

## 2019-05-01 NOTE — PROGRESS NOTE ADULT - ASSESSMENT
Mrs. Neri is an 85 year old female with HTN here with an NSTEMI. Overnight, with some left sided weakness that has resolved.  She is now s/p PCI to RCA and LCx    - Feeling well this morning and No events on telemetry  - Continue with ASA and Brilinta  - She has been unable to tolerate a statin in the past, and she should be started on a PCSK9 inhibitor as outpatient  - No further neurological symptoms  - CPKs have ow downtrended.   - Echo with overall preserved LV systolic fxn.   - Cont Toprol XL 25 mg po daily  - Start lisinopril 2.5mg Qday.   - if feeling well plan to dc home today with f/u with dr sanches w/in 2 weeks  spent >30 mins

## 2019-05-01 NOTE — PROGRESS NOTE ADULT - SUBJECTIVE AND OBJECTIVE BOX
[facility] -- Melania Gallo, Jose, Osvaldo, Manjeet Carrillo Savella  Office # [phone]      Follow Up:  CAD    Subjective/Observations: Patient seen and examined. Events noted. Resting comfortably in bed. No complaints of chest pain, dyspnea, or palpitations reported. No signs of orthopnea or PND.       REVIEW OF SYSTEMS: All other review of systems is negative unless indicated above    PAST MEDICAL & SURGICAL HISTORY:  History of loop recorder: placed by Dr. DHARMESH Ibarra  Syncope, unspecified syncope type  AAA (abdominal aortic aneurysm)  Coronary artery disease of native artery of native heart with stable angina pectoris  HTN (hypertension)  Back pain, chronic: occasional lower back pain  Benign paroxysmal vertigo of right ear: history of right ear abscess  Bladder cancer: dx 2012 treated with BCG  Status post cataract extraction: OD  History of cardiac monitoring: loop monitor  S/P cystoscopy  S/P D&C  S/P breast biopsy, left      MEDICATIONS  (STANDING):  ascorbic acid 500 milliGRAM(s) Oral daily  aspirin enteric coated 81 milliGRAM(s) Oral daily  cholecalciferol 2000 Unit(s) Oral daily  lactobacillus acidophilus 1 Tablet(s) Oral daily  metoprolol succinate ER 25 milliGRAM(s) Oral daily  ticagrelor 90 milliGRAM(s) Oral two times a day    MEDICATIONS  (PRN):      Allergies    adhesives (Other)  latex (Rash)  No Known Drug Allergies    Intolerances    statins (Muscle Pain)          Vital Signs Last 24 Hrs  T(C): 36.6 (01 May 2019 04:16), Max: 36.7 (30 Apr 2019 21:16)  T(F): 97.8 (01 May 2019 04:16), Max: 98.1 (30 Apr 2019 21:16)  HR: 66 (01 May 2019 04:16) (66 - 76)  BP: 114/69 (01 May 2019 04:16) (114/69 - 144/69)  BP(mean): --  RR: 17 (01 May 2019 04:16) (16 - 17)  SpO2: 93% (01 May 2019 04:16) (93% - 98%)    I&O's Summary    29 Apr 2019 07:01  -  30 Apr 2019 07:00  --------------------------------------------------------  IN: 600 mL / OUT: 200 mL / NET: 400 mL    30 Apr 2019 07:01  -  01 May 2019 06:31  --------------------------------------------------------  IN: 900 mL / OUT: 0 mL / NET: 900 mL          PHYSICAL EXAM:  TELE: SR  Constitutional: NAD, awake and alert, well-developed  HEENT: Moist Mucous Membranes, Anicteric  Pulmonary: Non-labored, breath sounds are clear bilaterally, No wheezing, rales or rhonchi  Cardiovascular: Regular, S1 and S2, No murmurs, rubs, gallops or clicks  Gastrointestinal: Bowel Sounds present, soft, nontender.   Lymph: No peripheral edema. No lymphadenopathy.  Skin: No visible rashes or ulcers.  Psych:  Mood & affect appropriate for situation    LABS: All Labs Reviewed:                        13.7   9.8   )-----------( 169      ( 01 May 2019 00:26 )             40.0                         14.1   7.8   )-----------( 157      ( 30 Apr 2019 00:54 )             41.2                         14.6   8.8   )-----------( 158      ( 29 Apr 2019 20:32 )             43.1     01 May 2019 00:26    140    |  104    |  23     ----------------------------<  101    4.4     |  22     |  0.94   30 Apr 2019 00:53    139    |  102    |  14     ----------------------------<  129    3.7     |  24     |  0.71   29 Apr 2019 20:32    138    |  101    |  12     ----------------------------<  103    6.2     |  23     |  0.70     Ca    9.5        01 May 2019 00:26  Ca    9.5        30 Apr 2019 00:53  Ca    9.6        29 Apr 2019 20:32    TPro  7.4    /  Alb  4.1    /  TBili  0.5    /  DBili  x      /  AST  181    /  ALT  39     /  AlkPhos  79     29 Apr 2019 20:32  TPro  7.7    /  Alb  3.7    /  TBili  0.6    /  DBili  x      /  AST  301    /  ALT  48     /  AlkPhos  93     29 Apr 2019 16:40    PT/INR - ( 29 Apr 2019 16:40 )   PT: 15.8 sec;   INR: 1.38 ratio         PTT - ( 29 Apr 2019 16:40 )  PTT:30.3 sec  CARDIAC MARKERS ( 01 May 2019 04:25 )  x     / x     / 303 U/L / x     / x      CARDIAC MARKERS ( 01 May 2019 00:26 )  x     / x     / 368 U/L / x     / 15.6 ng/mL  CARDIAC MARKERS ( 30 Apr 2019 19:34 )  x     / x     / 463 U/L / x     / 24.5 ng/mL  CARDIAC MARKERS ( 30 Apr 2019 13:26 )  x     / x     / 582 U/L / x     / 40.8 ng/mL  CARDIAC MARKERS ( 30 Apr 2019 07:33 )  x     / x     / 734 U/L / x     / 60.1 ng/mL  CARDIAC MARKERS ( 29 Apr 2019 16:40 )  92.500 ng/mL / x     / x     / x     / 180.6 ng/mL         < from: Transthoracic Echocardiogram (04.30.19 @ 17:06) >    Patient name: VANNESA HICKS  YOB: 1934   Age: 85 (F)   MR#: [id number]  Study Date: 4/30/2019  [facility]  Study quality: Technically fair  Referring Physician: Shamar Cornelius MD  Blood Pressure: 141/65 mmHg  Height: 158 cm  Weight: 74 kg  BSA: 1.8 m2  Heart Rate: 84 mmHg  ------------------------------------------------------------------------  PROCEDURE: Transthoracic echocardiogram with 2-D, M-Mode  and complete spectral and color flow Doppler.  INDICATION: Abnormal electrocardiogram (ECG) (EKG) (R94.31)  ------------------------------------------------------------------------  Dimensions:    Normal Values:  LA:     3.5    2.0 - 4.0 cm  Ao:     2.8    2.0 - 3.8 cm  SEPTUM: 1.0    0.6 - 1.2 cm  PWT:    1.0    0.6 - 1.1 cm  LVIDd:  4.2    3.0 - 5.6 cm  LVIDs:  3.0    1.8 - 4.0 cm  Derived variables:  LVMI: 78 g/m2  RWT: 0.47  Fractional short: 29 %  EF (Visual Estimate): 60-65 %  Doppler Peak Velocity (m/sec): AoV=1.4  ------------------------------------------------------------------------  Observations:  Mitral Valve: Mitral annular calcification, otherwise  normal mitral valve. Minimal mitral regurgitation.  Aortic Valve/Aorta: Aortic valve not well visualized;  appears calcified. Peak transaortic valve gradient equals 8  mm Hg, estimated aortic valve area equals 1.8 sqcm (by  continuity equation), aortic valve velocity time integral  equals 27 cm, consistent with mild aortic stenosis. Minimal  aortic regurgitation.  Peak left ventricular outflow tract  gradient equals 2 mm Hg, LVOT velocity time integral equals  17 cm.  Aortic Root: 2.8 cm.  LVOT diameter: 1.9 cm.  Left Atrium: Normal left atrium.  LA volume index = 29  cc/m2.  Left Ventricle: Endocardium not well visualized; grossly  normal left ventricular systolic function. Increased  relative wall thickness with normal left ventricular mass  index, consistent with concentric left ventricular  remodeling. Moderate diastolic dysfunction (Stage II).  Right Heart: Normal right atrium. The right ventricle is  not well visualized; grossly normal right ventricular  systolic function. Tricuspid valve not well visualized,  probably normal. Minimal tricuspid regurgitation. Pulmonic  valve not well visualized. Minimal pulmonic regurgitation.  Pericardium/Pleura: Normal pericardium with no pericardial  effusion.  Hemodynamic: Estimated right atrial pressure is 8 mm Hg.  Estimated right ventricular systolic pressure equals 12 mm  Hg, assuming right atrial pressure equals 8 mm Hg,  consistent with normal pulmonary pressures.  ------------------------------------------------------------------------  Conclusions:  1. Aortic valve not well visualized; appears calcified.  Peak transaortic valve gradient equals 8 mm Hg, estimated  aortic valve area equals 1.8 sqcm (by continuity equation),  aortic valve velocity time integral equals 27 cm,  consistent with mild aortic stenosis. Minimal aortic  regurgitation.  2. Increased relative wall thickness with normal left  ventricular mass index, consistent with concentric left  ventricular remodeling.  3. Endocardium not well visualized; grossly normal left  ventricular systolic function.  4. Moderate diastolic dysfunction (Stage II).  5. The right ventricle is not well visualized; grossly  normal right ventricular systolic function.  6. Estimated right ventricular systolic pressure equals 12  mm Hg, assuming right atrial pressure equals 8 mm Hg,  consistent with normal pulmonary pressures.  *** No previous Echo exam.  ------------------------------------------------------------------------  Confirmed on  4/30/2019 - 21:42:49 by Logan Carrington M.D.  ------------------------------------------------------------------------    < end of copied text >

## 2019-05-03 ENCOUNTER — NON-APPOINTMENT (OUTPATIENT)
Age: 84
End: 2019-05-03

## 2019-05-03 ENCOUNTER — APPOINTMENT (OUTPATIENT)
Dept: CARDIOLOGY | Facility: CLINIC | Age: 84
End: 2019-05-03
Payer: MEDICARE

## 2019-05-03 ENCOUNTER — INBOUND DOCUMENT (OUTPATIENT)
Age: 84
End: 2019-05-03

## 2019-05-03 VITALS
WEIGHT: 166 LBS | HEART RATE: 66 BPM | BODY MASS INDEX: 29.41 KG/M2 | DIASTOLIC BLOOD PRESSURE: 84 MMHG | HEIGHT: 63 IN | OXYGEN SATURATION: 94 % | SYSTOLIC BLOOD PRESSURE: 141 MMHG

## 2019-05-03 VITALS — DIASTOLIC BLOOD PRESSURE: 65 MMHG | SYSTOLIC BLOOD PRESSURE: 125 MMHG

## 2019-05-03 DIAGNOSIS — Z95.820 PERIPHERAL VASCULAR ANGIOPLASTY STATUS WITH IMPLANTS AND GRAFTS: ICD-10-CM

## 2019-05-03 PROCEDURE — 99215 OFFICE O/P EST HI 40 MIN: CPT

## 2019-05-03 PROCEDURE — 93000 ELECTROCARDIOGRAM COMPLETE: CPT

## 2019-05-03 NOTE — PHYSICAL EXAM
[Normal Appearance] : normal appearance [General Appearance - In No Acute Distress] : no acute distress [Normal Conjunctiva] : the conjunctiva exhibited no abnormalities [Normal Oral Mucosa] : normal oral mucosa [Normal Jugular Venous V Waves Present] : normal jugular venous V waves present [Abnormal Walk] : normal gait [Cyanosis, Localized] : no localized cyanosis [Nail Clubbing] : no clubbing of the fingernails [Petechial Hemorrhages (___cm)] : no petechial hemorrhages [Skin Color & Pigmentation] : normal skin color and pigmentation [No Venous Stasis] : no venous stasis [Skin Lesions] : no skin lesions [Oriented To Time, Place, And Person] : oriented to person, place, and time [Affect] : the affect was normal [Mood] : the mood was normal [No Anxiety] : not feeling anxious [FreeTextEntry1] : No JVD [Respiration, Rhythm And Depth] : normal respiratory rhythm and effort [Exaggerated Use Of Accessory Muscles For Inspiration] : no accessory muscle use [Auscultation Breath Sounds / Voice Sounds] : lungs were clear to auscultation bilaterally [Not Palpable] : not palpable [No Precordial Heave] : no precordial heave was noted [Bradycardia] : bradycardic [Rhythm Regular] : regular [Normal S1] : normal S1 [Normal S2] : normal S2 [No Gallop] : no gallop heard [No Murmur] : no murmurs heard [Right Carotid Bruit] : right carotid bruit heard [Left Carotid Bruit] : left carotid bruit heard [1+] : left 1+ [Bruit] : no bruit heard [No Pitting Edema] : no pitting edema present [Abdomen Soft] : soft [Abdomen Tenderness] : non-tender [Abdomen Mass (___ Cm)] : no abdominal mass palpated [] : no hepato-splenomegaly

## 2019-05-03 NOTE — HISTORY OF PRESENT ILLNESS
[FreeTextEntry1] : Adry presented to the office today for a followup evaluation. She was last seen in the office 1 month ago, but was more recently hospitalized.\par \par She is now 85 years old, with a history of bladder cancer. She has a history of elevated blood pressures in the office without a definite diagnosis of hypertension, not presently treated. In May of 2016, she had an episode of syncope.  While hospitalized, she was observed to have nonsustained ventricular tachycardia. She was transferred for further evaluation. Nuclear stress testing and echocardiography were unremarkable. A loop recorder was implanted. Over time this has revealed nsvt and svt, but she has had no symptoms.  She has a 4.0 cm AAA, which has been followed.\par \par We have recently been dealing with hypertension, for which medications have been in chest.\par \par She phoned the office several days ago reporting 24 hours of intermittent chest discomfort, which was suspicious for an ischemic problem. I recommended that she go directly to the emergency department. She was found to have elevated cardiac enzymes, and was transferred for cardiac catheterization. She was found to have double vessel coronary artery disease with an ulcerated circumflex and a severe RCA stenosis. She went on to have double vessel PCI. Given the elevations in her cardiac enzymes, she was kept an extra day. Her medications were adjusted, and she was eventually discharged.\par \par Although she has been tired, mostly because of sleep deprivation, she has been feeling better over the last day or so. She reports no chest discomfort or difficulty breathing. She denies orthopnea, PND and edema. She has had no bleeding. She has not been checking her blood pressure at home.

## 2019-05-03 NOTE — DISCUSSION/SUMMARY
[FreeTextEntry1] : Unfortunately, she suffered a recent myocardial infarction. She has had double vessel PCI, and is recovering nicely.\par \par Her blood pressure is a bit elevated initially, but improves by the conclusion of the examination. I will not change her medication at this time.\par \par She will continue dual antiplatelet therapy. I explained the need to continue this for about a year. Hopefully she will tolerate it.\par \par I requested a followup in about a month to evaluate her blood pressure and make sure that she is continuing to recover nicely. She will call with questions or concerns in the meantime.

## 2019-05-07 PROBLEM — Z95.820 S/P ANGIOPLASTY WITH STENT: Status: ACTIVE | Noted: 2019-05-07

## 2019-05-07 RX ORDER — TICAGRELOR 90 MG/1
90 TABLET ORAL
Refills: 0 | Status: DISCONTINUED | COMMUNITY
End: 2019-05-07

## 2019-05-22 ENCOUNTER — OTHER (OUTPATIENT)
Age: 84
End: 2019-05-22

## 2019-05-23 ENCOUNTER — NON-APPOINTMENT (OUTPATIENT)
Age: 84
End: 2019-05-23

## 2019-05-23 ENCOUNTER — APPOINTMENT (OUTPATIENT)
Dept: CARDIOLOGY | Facility: CLINIC | Age: 84
End: 2019-05-23
Payer: MEDICARE

## 2019-05-23 VITALS
SYSTOLIC BLOOD PRESSURE: 136 MMHG | WEIGHT: 167 LBS | DIASTOLIC BLOOD PRESSURE: 73 MMHG | BODY MASS INDEX: 29.59 KG/M2 | HEIGHT: 63 IN | OXYGEN SATURATION: 98 % | HEART RATE: 63 BPM

## 2019-05-23 PROCEDURE — 93000 ELECTROCARDIOGRAM COMPLETE: CPT

## 2019-05-23 PROCEDURE — 99214 OFFICE O/P EST MOD 30 MIN: CPT

## 2019-05-23 NOTE — HISTORY OF PRESENT ILLNESS
[FreeTextEntry1] : Adry presented to the office today for a followup evaluation. She was last seen in the office  3 weeks ago.\par \par She is now 85 years old, with a history of bladder cancer. She has a history of elevated blood pressures in the office without a definite diagnosis of hypertension, not presently treated. In May of 2016, she had an episode of syncope.  While hospitalized, she was observed to have nonsustained ventricular tachycardia. She was transferred for further evaluation. Nuclear stress testing and echocardiography were unremarkable. A loop recorder was implanted. Over time this has revealed nsvt and svt, but she has had no symptoms.  She has a 4.0 cm AAA, which has been followed.\par \par We had been dealing with hypertension, for which medications have been adjusted.\par \par Subsequent to that, she was admitted to the hospital with a myocardial infarction, for which she had PCI of the circumflex and the right coronary artery. She saw me in the office on May 3, and was doing well at that time.\par \par She phoned the office yesterday reporting lightheadedness. Over a period of time, all of her medication apart from her antiplatelet therapy and her metoprolol had been discontinued because of these symptoms, and observed hypotension. I stopped the metoprolol yesterday. She comes to the office today for followup.\par \par She still does not feel well, but her blood pressure has improved. She saw her primary care physician earlier today, and blood work has been done. She does not report any chest discomfort that would be suggestive of recurrent ischemia. She denies orthopnea, and PND. She has a tendency toward edema, which is chronic. She reports infrequent palpitations, which are relatively brief period

## 2019-05-23 NOTE — DISCUSSION/SUMMARY
[FreeTextEntry1] : It is not clear to me why all of a sudden she is relatively hypotensive, and cannot tolerate medications. She does not have any infectious symptoms. She does not appear to have had an acute ischemic event. She has no current symptoms. Her EKG is a little bit different, likely for technical reasons.\par \par For now, I would just stay the course. If she develops any new symptoms, she will call me. She will remain only on aspirin Plavix, and off all of her other medication. Hopefully, her blood pressure will stabilize, and we will be able to resume various medicines.

## 2019-05-23 NOTE — PHYSICAL EXAM
[Normal Appearance] : normal appearance [General Appearance - In No Acute Distress] : no acute distress [Normal Conjunctiva] : the conjunctiva exhibited no abnormalities [Normal Oral Mucosa] : normal oral mucosa [Normal Jugular Venous V Waves Present] : normal jugular venous V waves present [FreeTextEntry1] : No JVD [Respiration, Rhythm And Depth] : normal respiratory rhythm and effort [Exaggerated Use Of Accessory Muscles For Inspiration] : no accessory muscle use [Auscultation Breath Sounds / Voice Sounds] : lungs were clear to auscultation bilaterally [Abdomen Soft] : soft [Abdomen Tenderness] : non-tender [Abdomen Mass (___ Cm)] : no abdominal mass palpated [Abnormal Walk] : normal gait [Nail Clubbing] : no clubbing of the fingernails [Cyanosis, Localized] : no localized cyanosis [Petechial Hemorrhages (___cm)] : no petechial hemorrhages [Skin Color & Pigmentation] : normal skin color and pigmentation [] : no rash [No Venous Stasis] : no venous stasis [Skin Lesions] : no skin lesions [Oriented To Time, Place, And Person] : oriented to person, place, and time [Affect] : the affect was normal [Mood] : the mood was normal [No Anxiety] : not feeling anxious [Not Palpable] : not palpable [No Precordial Heave] : no precordial heave was noted [Bradycardia] : bradycardic [Rhythm Regular] : regular [Normal S1] : normal S1 [Normal S2] : normal S2 [No Gallop] : no gallop heard [No Murmur] : no murmurs heard [Right Carotid Bruit] : right carotid bruit heard [Left Carotid Bruit] : left carotid bruit heard [1+] : left 1+ [Bruit] : no bruit heard [No Pitting Edema] : no pitting edema present

## 2019-05-31 ENCOUNTER — APPOINTMENT (OUTPATIENT)
Dept: CARDIOLOGY | Facility: CLINIC | Age: 84
End: 2019-05-31

## 2019-06-06 ENCOUNTER — TRANSCRIPTION ENCOUNTER (OUTPATIENT)
Age: 84
End: 2019-06-06

## 2019-07-22 ENCOUNTER — OUTPATIENT (OUTPATIENT)
Dept: OUTPATIENT SERVICES | Facility: HOSPITAL | Age: 84
LOS: 1 days | End: 2019-07-22
Payer: MEDICARE

## 2019-07-22 VITALS
WEIGHT: 160.06 LBS | DIASTOLIC BLOOD PRESSURE: 72 MMHG | HEART RATE: 55 BPM | HEIGHT: 62.5 IN | TEMPERATURE: 98 F | RESPIRATION RATE: 18 BRPM | OXYGEN SATURATION: 96 % | SYSTOLIC BLOOD PRESSURE: 164 MMHG

## 2019-07-22 DIAGNOSIS — Z98.49 CATARACT EXTRACTION STATUS, UNSPECIFIED EYE: Chronic | ICD-10-CM

## 2019-07-22 DIAGNOSIS — I47.1 SUPRAVENTRICULAR TACHYCARDIA: ICD-10-CM

## 2019-07-22 DIAGNOSIS — Z92.89 PERSONAL HISTORY OF OTHER MEDICAL TREATMENT: Chronic | ICD-10-CM

## 2019-07-22 PROCEDURE — 93005 ELECTROCARDIOGRAM TRACING: CPT

## 2019-07-22 PROCEDURE — 33286 RMVL SUBQ CAR RHYTHM MNTR: CPT

## 2019-07-22 PROCEDURE — 93010 ELECTROCARDIOGRAM REPORT: CPT

## 2019-07-22 RX ORDER — CHOLECALCIFEROL (VITAMIN D3) 125 MCG
1 CAPSULE ORAL
Qty: 0 | Refills: 0 | DISCHARGE

## 2019-07-22 RX ORDER — LACTOBACILLUS ACIDOPHILUS 100MM CELL
2 CAPSULE ORAL
Qty: 0 | Refills: 0 | DISCHARGE

## 2019-07-22 RX ORDER — ASCORBIC ACID 60 MG
1 TABLET,CHEWABLE ORAL
Qty: 0 | Refills: 0 | DISCHARGE

## 2019-07-22 NOTE — H&P CARDIOLOGY - PSH
History of cardiac monitoring  loop monitor  S/P breast biopsy, left    S/P cystoscopy    S/P D&C    Status post cataract extraction  OD

## 2019-07-22 NOTE — H&P CARDIOLOGY - HISTORY OF PRESENT ILLNESS
86 y/o female with PMHx of Loop recorder 2016, AAA, HTN, Syncope, Recent PCI/Stents placed to the mid RCA and mid circumflex on 4/29/19, presents for Loop Explant. 86 y/o female with PMHx of Loop recorder in 2016, AAA, HTN, Syncope, Recent PCI/Stents placed to the mid RCA and mid circumflex on 4/29/19, presents for Loop Explant.

## 2019-08-08 ENCOUNTER — APPOINTMENT (OUTPATIENT)
Dept: ELECTROPHYSIOLOGY | Facility: CLINIC | Age: 84
End: 2019-08-08
Payer: MEDICARE

## 2019-08-08 VITALS
HEIGHT: 63 IN | BODY MASS INDEX: 28.53 KG/M2 | DIASTOLIC BLOOD PRESSURE: 64 MMHG | OXYGEN SATURATION: 96 % | SYSTOLIC BLOOD PRESSURE: 146 MMHG | WEIGHT: 161 LBS | HEART RATE: 77 BPM

## 2019-08-08 PROCEDURE — ZZZZZ: CPT

## 2019-08-08 NOTE — PHYSICAL EXAM
[FreeTextEntry2] : Surgical incision site on L sternal border healing well w/o any redness, swelling or drainage. [FreeTextEntry1] : L sternal border

## 2019-08-08 NOTE — ASSESSMENT
[FreeTextEntry1] : Pt here for wound f/u s/p loop explant. Pt denies any c/o since discharge. Surgical incision site on L sternal border  healing well w/o any redness, swelling or discharge.

## 2019-08-08 NOTE — DISCUSSION/SUMMARY
[FreeTextEntry1] : Pt here for wound f/u s/p loop explant. Pt denies any c/o since discharge. Surgical incision site on L sternal border  healing well w/o any redness, swelling or discharge. Pt to continue f/u with Dr. Riley.

## 2019-09-04 ENCOUNTER — APPOINTMENT (OUTPATIENT)
Dept: CARDIOLOGY | Facility: CLINIC | Age: 84
End: 2019-09-04
Payer: MEDICARE

## 2019-09-04 ENCOUNTER — NON-APPOINTMENT (OUTPATIENT)
Age: 84
End: 2019-09-04

## 2019-09-04 VITALS
DIASTOLIC BLOOD PRESSURE: 84 MMHG | OXYGEN SATURATION: 97 % | SYSTOLIC BLOOD PRESSURE: 148 MMHG | HEART RATE: 69 BPM | HEIGHT: 63 IN | WEIGHT: 164 LBS | BODY MASS INDEX: 29.06 KG/M2

## 2019-09-04 DIAGNOSIS — R07.89 OTHER CHEST PAIN: ICD-10-CM

## 2019-09-04 PROCEDURE — 99215 OFFICE O/P EST HI 40 MIN: CPT

## 2019-09-04 PROCEDURE — 93000 ELECTROCARDIOGRAM COMPLETE: CPT

## 2019-09-04 NOTE — PHYSICAL EXAM
[Normal Appearance] : normal appearance [Normal Conjunctiva] : the conjunctiva exhibited no abnormalities [General Appearance - In No Acute Distress] : no acute distress [Normal Oral Mucosa] : normal oral mucosa [Normal Jugular Venous V Waves Present] : normal jugular venous V waves present [FreeTextEntry1] : No JVD [Respiration, Rhythm And Depth] : normal respiratory rhythm and effort [Exaggerated Use Of Accessory Muscles For Inspiration] : no accessory muscle use [Auscultation Breath Sounds / Voice Sounds] : lungs were clear to auscultation bilaterally [Abdomen Soft] : soft [Abdomen Tenderness] : non-tender [Abdomen Mass (___ Cm)] : no abdominal mass palpated [Abnormal Walk] : normal gait [Nail Clubbing] : no clubbing of the fingernails [Cyanosis, Localized] : no localized cyanosis [Petechial Hemorrhages (___cm)] : no petechial hemorrhages [Skin Color & Pigmentation] : normal skin color and pigmentation [No Venous Stasis] : no venous stasis [] : no rash [Skin Lesions] : no skin lesions [Affect] : the affect was normal [Oriented To Time, Place, And Person] : oriented to person, place, and time [Mood] : the mood was normal [No Anxiety] : not feeling anxious [Not Palpable] : not palpable [No Precordial Heave] : no precordial heave was noted [Normal Rate] : normal [Normal S1] : normal S1 [Rhythm Regular] : regular [Normal S2] : normal S2 [No Gallop] : no gallop heard [No Murmur] : no murmurs heard [Right Carotid Bruit] : right carotid bruit heard [Left Carotid Bruit] : left carotid bruit heard [1+] : left 1+ [No Pitting Edema] : no pitting edema present [Bruit] : no bruit heard

## 2019-09-04 NOTE — HISTORY OF PRESENT ILLNESS
[FreeTextEntry1] : Adry presented to the office today for a followup evaluation. She was last seen in the office  3 months ago.\par \par She is now 85 years old, with a history of bladder cancer. She has a history of elevated blood pressures in the office without a definite diagnosis of hypertension, not presently treated. In May of 2016, she had an episode of syncope.  While hospitalized, she was observed to have nonsustained ventricular tachycardia. She was transferred for further evaluation. Nuclear stress testing and echocardiography were unremarkable. A loop recorder was implanted, and more recently explanted. She has a 4.0 cm AAA, which has been followed. In April, she was admitted to the hospital with a myocardial infarction, for which she had PCI of the circumflex and the right coronary artery. \par \par She presents to the office today reporting chest discomfort. She reports that over the last month, she has had daily episodes of chest discomfort lasting for variable amounts of time. She does not clearly describes this is related to food, though it does intermittently have been after meals and at night. It seems fairly random in onset. She describes the symptoms as being similar but not identical to the symptoms which she was experiencing with her myocardial infarction. She describes some shortness of breath as well, both with activity, and at rest while speaking. She mentions very clearly that she has been under significant emotional stress relating to her financial situation. She believes that her symptoms today are stress related. She denies symptoms of heart failure, and symptoms of significant arrhythmia. She reports that her blood pressure has been very well controlled. Notably, she had been on agreed to more blood pressure medication, which was eventually reduced because of hypotension.

## 2019-09-04 NOTE — DISCUSSION/SUMMARY
[FreeTextEntry1] : Is not clear what her symptoms are from. She has been having symptoms essentially at rest for a month, without any real evolution. This would argue against it being truly cardiac, as I would expect this to have progressed to a real myocardial infarction at this point. Her EKG is difficult to interpret, as she has some nonspecific T-wave abnormalities, but these are greatly improved relative to what she had several months ago.\par \par I think there is a chance that her symptoms are related to emotional stress, and possibly reflux. She will start ranitidine. I explained that we can try to pursue noninvasive testing, although there are some limitations to this method. I also explained that we could go directly to cardiac catheterization. She does not wish to pursue cardiac catheterization directly. I explained that we will schedule stress test as soon as possible, but that she needs to inform me if she has any significant episodes, and go to the emergency department immediately if that is the case. She agrees. She'll keep in close contact.

## 2019-09-26 NOTE — PATIENT PROFILE ADULT - PRIMARY ROLES/RESPONSIBILITIES
Immediate Brief Procedure Note    Patient: Holli [place]insburg    Pre-op Diagnosis:  removal of dialysis catheter requested; patient planning to leave [place]     Post-op Diagnosis: same    Procedure: dialysis catheter removal    Proceduralist: Ortiz Bal PA-C    Assistants: none     Anesthesia Staff: Anesthesia staff cannot be found from this context.    Anesthesia Type: none     Findings: dialysis catheter removed, fully intact     Estimated Blood Loss: <5 mL    Complications: none immediate     Specimens Removed: dialysis catheter     Supervising physician: Dr. Iglesias       retired

## 2019-09-30 ENCOUNTER — APPOINTMENT (OUTPATIENT)
Dept: CARDIOLOGY | Facility: CLINIC | Age: 84
End: 2019-09-30
Payer: MEDICARE

## 2019-09-30 PROCEDURE — A9500: CPT

## 2019-09-30 PROCEDURE — 78452 HT MUSCLE IMAGE SPECT MULT: CPT

## 2019-09-30 PROCEDURE — 93015 CV STRESS TEST SUPVJ I&R: CPT

## 2019-10-28 ENCOUNTER — RX RENEWAL (OUTPATIENT)
Age: 84
End: 2019-10-28

## 2019-11-25 ENCOUNTER — INPATIENT (INPATIENT)
Facility: HOSPITAL | Age: 84
LOS: 0 days | Discharge: ANOTHER HOSPITAL | DRG: 282 | End: 2019-11-26
Attending: INTERNAL MEDICINE | Admitting: INTERNAL MEDICINE
Payer: MEDICARE

## 2019-11-25 VITALS
OXYGEN SATURATION: 95 % | RESPIRATION RATE: 18 BRPM | DIASTOLIC BLOOD PRESSURE: 99 MMHG | WEIGHT: 160.06 LBS | TEMPERATURE: 97 F | HEART RATE: 92 BPM | SYSTOLIC BLOOD PRESSURE: 182 MMHG

## 2019-11-25 DIAGNOSIS — Z98.49 CATARACT EXTRACTION STATUS, UNSPECIFIED EYE: Chronic | ICD-10-CM

## 2019-11-25 DIAGNOSIS — Z92.89 PERSONAL HISTORY OF OTHER MEDICAL TREATMENT: Chronic | ICD-10-CM

## 2019-11-25 LAB
ALBUMIN SERPL ELPH-MCNC: 3.9 G/DL — SIGNIFICANT CHANGE UP (ref 3.3–5)
ALP SERPL-CCNC: 76 U/L — SIGNIFICANT CHANGE UP (ref 40–120)
ALT FLD-CCNC: 18 U/L — SIGNIFICANT CHANGE UP (ref 12–78)
ANION GAP SERPL CALC-SCNC: 6 MMOL/L — SIGNIFICANT CHANGE UP (ref 5–17)
AST SERPL-CCNC: 21 U/L — SIGNIFICANT CHANGE UP (ref 15–37)
BASOPHILS # BLD AUTO: 0.02 K/UL — SIGNIFICANT CHANGE UP (ref 0–0.2)
BASOPHILS NFR BLD AUTO: 0.3 % — SIGNIFICANT CHANGE UP (ref 0–2)
BILIRUB SERPL-MCNC: 0.3 MG/DL — SIGNIFICANT CHANGE UP (ref 0.2–1.2)
BUN SERPL-MCNC: 23 MG/DL — SIGNIFICANT CHANGE UP (ref 7–23)
CALCIUM SERPL-MCNC: 8.8 MG/DL — SIGNIFICANT CHANGE UP (ref 8.5–10.1)
CHLORIDE SERPL-SCNC: 109 MMOL/L — HIGH (ref 96–108)
CK SERPL-CCNC: 73 U/L — SIGNIFICANT CHANGE UP (ref 26–192)
CO2 SERPL-SCNC: 28 MMOL/L — SIGNIFICANT CHANGE UP (ref 22–31)
CREAT SERPL-MCNC: 0.83 MG/DL — SIGNIFICANT CHANGE UP (ref 0.5–1.3)
EOSINOPHIL # BLD AUTO: 0.17 K/UL — SIGNIFICANT CHANGE UP (ref 0–0.5)
EOSINOPHIL NFR BLD AUTO: 2.6 % — SIGNIFICANT CHANGE UP (ref 0–6)
GLUCOSE SERPL-MCNC: 159 MG/DL — HIGH (ref 70–99)
HCT VFR BLD CALC: 40.8 % — SIGNIFICANT CHANGE UP (ref 34.5–45)
HGB BLD-MCNC: 13.1 G/DL — SIGNIFICANT CHANGE UP (ref 11.5–15.5)
IMM GRANULOCYTES NFR BLD AUTO: 0.2 % — SIGNIFICANT CHANGE UP (ref 0–1.5)
LYMPHOCYTES # BLD AUTO: 1.98 K/UL — SIGNIFICANT CHANGE UP (ref 1–3.3)
LYMPHOCYTES # BLD AUTO: 30 % — SIGNIFICANT CHANGE UP (ref 13–44)
MCHC RBC-ENTMCNC: 30 PG — SIGNIFICANT CHANGE UP (ref 27–34)
MCHC RBC-ENTMCNC: 32.1 GM/DL — SIGNIFICANT CHANGE UP (ref 32–36)
MCV RBC AUTO: 93.6 FL — SIGNIFICANT CHANGE UP (ref 80–100)
MONOCYTES # BLD AUTO: 0.6 K/UL — SIGNIFICANT CHANGE UP (ref 0–0.9)
MONOCYTES NFR BLD AUTO: 9.1 % — SIGNIFICANT CHANGE UP (ref 2–14)
NEUTROPHILS # BLD AUTO: 3.81 K/UL — SIGNIFICANT CHANGE UP (ref 1.8–7.4)
NEUTROPHILS NFR BLD AUTO: 57.8 % — SIGNIFICANT CHANGE UP (ref 43–77)
NRBC # BLD: 0 /100 WBCS — SIGNIFICANT CHANGE UP (ref 0–0)
PLATELET # BLD AUTO: 170 K/UL — SIGNIFICANT CHANGE UP (ref 150–400)
POTASSIUM SERPL-MCNC: 3.4 MMOL/L — LOW (ref 3.5–5.3)
POTASSIUM SERPL-SCNC: 3.4 MMOL/L — LOW (ref 3.5–5.3)
PROT SERPL-MCNC: 7 G/DL — SIGNIFICANT CHANGE UP (ref 6–8.3)
RBC # BLD: 4.36 M/UL — SIGNIFICANT CHANGE UP (ref 3.8–5.2)
RBC # FLD: 13.9 % — SIGNIFICANT CHANGE UP (ref 10.3–14.5)
SODIUM SERPL-SCNC: 143 MMOL/L — SIGNIFICANT CHANGE UP (ref 135–145)
TROPONIN I SERPL-MCNC: 0.04 NG/ML — SIGNIFICANT CHANGE UP (ref 0.01–0.04)
WBC # BLD: 6.59 K/UL — SIGNIFICANT CHANGE UP (ref 3.8–10.5)
WBC # FLD AUTO: 6.59 K/UL — SIGNIFICANT CHANGE UP (ref 3.8–10.5)

## 2019-11-25 PROCEDURE — 93010 ELECTROCARDIOGRAM REPORT: CPT

## 2019-11-25 PROCEDURE — 71045 X-RAY EXAM CHEST 1 VIEW: CPT | Mod: 26

## 2019-11-25 RX ORDER — ASPIRIN/CALCIUM CARB/MAGNESIUM 324 MG
325 TABLET ORAL ONCE
Refills: 0 | Status: COMPLETED | OUTPATIENT
Start: 2019-11-25 | End: 2019-11-25

## 2019-11-25 RX ADMIN — Medication 325 MILLIGRAM(S): at 23:04

## 2019-11-25 NOTE — ED PROVIDER NOTE - OBJECTIVE STATEMENT
84yo female who presents with chest pain on and off today, pt c/o intermittnet chest heaviness, 7/10, midsternal, non radiating, with sob, no palpitations, no dizziness, no diaphoresis, pt has hx of MI with 2 stents from april this year

## 2019-11-25 NOTE — ED ADULT NURSE NOTE - ED STAT RN HANDOFF DETAILS 2
Pt stable and resting in bed. Pt denies any pain or discomfort as of this time. Pt admitted and handoff report giver to LIZ Borjas  for continuum of care. No sign of distress noted

## 2019-11-25 NOTE — ED ADULT NURSE NOTE - ED STAT RN HANDOFF DETAILS
Handoff report given to Dea HILL. Pt is on monitor. vitals stable. Heparin infusion on flow. Family at bedside. Waiting for Dr. Burroughs for Cardio consult.

## 2019-11-25 NOTE — ED PROVIDER NOTE - PROGRESS NOTE DETAILS
pt/family decide to stay and be admitted shauna=iman cooper (d perlman covers) d/w dr jurado resident, will inform dr perlman, aware of pt status. NSTEMI

## 2019-11-25 NOTE — ED ADULT NURSE NOTE - CHIEF COMPLAINT QUOTE
Pt reports 7/10 intermittent chest pain which radiates down and into her back since this afternoon. Pt denies any other symptoms.

## 2019-11-25 NOTE — ED ADULT NURSE NOTE - OBJECTIVE STATEMENT
Pt received in ER with c/o chest pain dull and aching started on left side of chest and radiating to back since today afternoon. AO4, Ambulatory. No relief on resting. No N, V, Sweats. Placed on Monitor. EKG done. Line and labs done. Family at bedside. Hx of HTN, Stent placement in April 2019 after and MI, On Plavix and Baby Aspirin. Skin intact, XRay to be done. Couple bruised spots noticed on upper extremities. EX smoker 30yrs ago

## 2019-11-25 NOTE — ED ADULT NURSE NOTE - NSIMPLEMENTINTERV_GEN_ALL_ED
Implemented All Universal Safety Interventions:  [place] to call system. Call bell, personal items and telephone within reach. Instruct patient to call for assistance. Room bathroom lighting operational. Non-slip footwear when patient is off stretcher. Physically safe environment: no spills, clutter or unnecessary equipment. Stretcher in lowest position, wheels locked, appropriate side rails in place.

## 2019-11-26 ENCOUNTER — INPATIENT (INPATIENT)
Facility: HOSPITAL | Age: 84
LOS: 0 days | Discharge: HOME | DRG: 281 | End: 2019-11-27
Attending: INTERNAL MEDICINE | Admitting: INTERNAL MEDICINE
Payer: MEDICARE

## 2019-11-26 ENCOUNTER — TRANSCRIPTION ENCOUNTER (OUTPATIENT)
Age: 84
End: 2019-11-26

## 2019-11-26 VITALS — DIASTOLIC BLOOD PRESSURE: 77 MMHG | SYSTOLIC BLOOD PRESSURE: 147 MMHG | HEART RATE: 84 BPM | RESPIRATION RATE: 18 BRPM

## 2019-11-26 VITALS
DIASTOLIC BLOOD PRESSURE: 70 MMHG | HEART RATE: 72 BPM | OXYGEN SATURATION: 98 % | HEIGHT: 63 IN | TEMPERATURE: 98 F | SYSTOLIC BLOOD PRESSURE: 150 MMHG | WEIGHT: 160.06 LBS | RESPIRATION RATE: 18 BRPM

## 2019-11-26 DIAGNOSIS — C67.9 MALIGNANT NEOPLASM OF BLADDER, UNSPECIFIED: ICD-10-CM

## 2019-11-26 DIAGNOSIS — Z92.89 PERSONAL HISTORY OF OTHER MEDICAL TREATMENT: Chronic | ICD-10-CM

## 2019-11-26 DIAGNOSIS — Z29.9 ENCOUNTER FOR PROPHYLACTIC MEASURES, UNSPECIFIED: ICD-10-CM

## 2019-11-26 DIAGNOSIS — Z98.49 CATARACT EXTRACTION STATUS, UNSPECIFIED EYE: Chronic | ICD-10-CM

## 2019-11-26 DIAGNOSIS — I24.9 ACUTE ISCHEMIC HEART DISEASE, UNSPECIFIED: ICD-10-CM

## 2019-11-26 DIAGNOSIS — I20.0 UNSTABLE ANGINA: ICD-10-CM

## 2019-11-26 DIAGNOSIS — I10 ESSENTIAL (PRIMARY) HYPERTENSION: ICD-10-CM

## 2019-11-26 DIAGNOSIS — I25.10 ATHEROSCLEROTIC HEART DISEASE OF NATIVE CORONARY ARTERY WITHOUT ANGINA PECTORIS: ICD-10-CM

## 2019-11-26 DIAGNOSIS — I71.4 ABDOMINAL AORTIC ANEURYSM, WITHOUT RUPTURE: ICD-10-CM

## 2019-11-26 LAB
ANION GAP SERPL CALC-SCNC: 5 MMOL/L — SIGNIFICANT CHANGE UP (ref 5–17)
APTT BLD: 31.2 SEC — SIGNIFICANT CHANGE UP (ref 28.5–37)
APTT BLD: 89.8 SEC — HIGH (ref 28.5–37)
BUN SERPL-MCNC: 18 MG/DL — SIGNIFICANT CHANGE UP (ref 7–23)
CALCIUM SERPL-MCNC: 8.8 MG/DL — SIGNIFICANT CHANGE UP (ref 8.5–10.1)
CHLORIDE SERPL-SCNC: 110 MMOL/L — HIGH (ref 96–108)
CK MB BLD-MCNC: 10.8 % — HIGH (ref 0–3.5)
CK MB BLD-MCNC: 12.1 % — HIGH (ref 0–3.5)
CK MB CFR SERPL CALC: 15.4 NG/ML — HIGH (ref 0–3.6)
CK MB CFR SERPL CALC: 20.4 NG/ML — HIGH (ref 0–3.6)
CK SERPL-CCNC: 142 U/L — SIGNIFICANT CHANGE UP (ref 26–192)
CK SERPL-CCNC: 169 U/L — SIGNIFICANT CHANGE UP (ref 26–192)
CO2 SERPL-SCNC: 29 MMOL/L — SIGNIFICANT CHANGE UP (ref 22–31)
CREAT SERPL-MCNC: 0.67 MG/DL — SIGNIFICANT CHANGE UP (ref 0.5–1.3)
GLUCOSE SERPL-MCNC: 115 MG/DL — HIGH (ref 70–99)
HCT VFR BLD CALC: 38.3 % — SIGNIFICANT CHANGE UP (ref 34.5–45)
HGB BLD-MCNC: 12.5 G/DL — SIGNIFICANT CHANGE UP (ref 11.5–15.5)
INR BLD: 1.47 RATIO — HIGH (ref 0.88–1.16)
MCHC RBC-ENTMCNC: 30.1 PG — SIGNIFICANT CHANGE UP (ref 27–34)
MCHC RBC-ENTMCNC: 32.6 GM/DL — SIGNIFICANT CHANGE UP (ref 32–36)
MCV RBC AUTO: 92.3 FL — SIGNIFICANT CHANGE UP (ref 80–100)
NRBC # BLD: 0 /100 WBCS — SIGNIFICANT CHANGE UP (ref 0–0)
PLATELET # BLD AUTO: 163 K/UL — SIGNIFICANT CHANGE UP (ref 150–400)
POTASSIUM SERPL-MCNC: 3.6 MMOL/L — SIGNIFICANT CHANGE UP (ref 3.5–5.3)
POTASSIUM SERPL-SCNC: 3.6 MMOL/L — SIGNIFICANT CHANGE UP (ref 3.5–5.3)
PROTHROM AB SERPL-ACNC: 16.8 SEC — HIGH (ref 10–12.9)
RBC # BLD: 4.15 M/UL — SIGNIFICANT CHANGE UP (ref 3.8–5.2)
RBC # FLD: 14 % — SIGNIFICANT CHANGE UP (ref 10.3–14.5)
SODIUM SERPL-SCNC: 144 MMOL/L — SIGNIFICANT CHANGE UP (ref 135–145)
TROPONIN I SERPL-MCNC: 1.73 NG/ML — HIGH (ref 0.01–0.04)
TROPONIN I SERPL-MCNC: 4.25 NG/ML — HIGH (ref 0.01–0.04)
WBC # BLD: 7.91 K/UL — SIGNIFICANT CHANGE UP (ref 3.8–10.5)
WBC # FLD AUTO: 7.91 K/UL — SIGNIFICANT CHANGE UP (ref 3.8–10.5)

## 2019-11-26 PROCEDURE — 84484 ASSAY OF TROPONIN QUANT: CPT

## 2019-11-26 PROCEDURE — 82553 CREATINE MB FRACTION: CPT

## 2019-11-26 PROCEDURE — 96365 THER/PROPH/DIAG IV INF INIT: CPT

## 2019-11-26 PROCEDURE — 82550 ASSAY OF CK (CPK): CPT

## 2019-11-26 PROCEDURE — 93010 ELECTROCARDIOGRAM REPORT: CPT

## 2019-11-26 PROCEDURE — 93454 CORONARY ARTERY ANGIO S&I: CPT | Mod: 26

## 2019-11-26 PROCEDURE — 80053 COMPREHEN METABOLIC PANEL: CPT

## 2019-11-26 PROCEDURE — 85027 COMPLETE CBC AUTOMATED: CPT

## 2019-11-26 PROCEDURE — 99291 CRITICAL CARE FIRST HOUR: CPT

## 2019-11-26 PROCEDURE — 85610 PROTHROMBIN TIME: CPT

## 2019-11-26 PROCEDURE — 99285 EMERGENCY DEPT VISIT HI MDM: CPT | Mod: 25

## 2019-11-26 PROCEDURE — 96366 THER/PROPH/DIAG IV INF ADDON: CPT

## 2019-11-26 PROCEDURE — 71045 X-RAY EXAM CHEST 1 VIEW: CPT

## 2019-11-26 PROCEDURE — 93005 ELECTROCARDIOGRAM TRACING: CPT

## 2019-11-26 PROCEDURE — 36415 COLL VENOUS BLD VENIPUNCTURE: CPT

## 2019-11-26 PROCEDURE — 85730 THROMBOPLASTIN TIME PARTIAL: CPT

## 2019-11-26 PROCEDURE — 80048 BASIC METABOLIC PNL TOTAL CA: CPT

## 2019-11-26 PROCEDURE — 99285 EMERGENCY DEPT VISIT HI MDM: CPT

## 2019-11-26 RX ORDER — CLOPIDOGREL BISULFATE 75 MG/1
75 TABLET, FILM COATED ORAL DAILY
Refills: 0 | Status: DISCONTINUED | OUTPATIENT
Start: 2019-11-27 | End: 2019-11-27

## 2019-11-26 RX ORDER — MULTIVIT-MIN/FERROUS GLUCONATE 9 MG/15 ML
1 LIQUID (ML) ORAL DAILY
Refills: 0 | Status: DISCONTINUED | OUTPATIENT
Start: 2019-11-26 | End: 2019-11-27

## 2019-11-26 RX ORDER — ASPIRIN/CALCIUM CARB/MAGNESIUM 324 MG
81 TABLET ORAL DAILY
Refills: 0 | Status: DISCONTINUED | OUTPATIENT
Start: 2019-11-26 | End: 2019-11-27

## 2019-11-26 RX ORDER — ASPIRIN/CALCIUM CARB/MAGNESIUM 324 MG
81 TABLET ORAL DAILY
Refills: 0 | Status: DISCONTINUED | OUTPATIENT
Start: 2019-11-26 | End: 2019-11-26

## 2019-11-26 RX ORDER — HEPARIN SODIUM 5000 [USP'U]/ML
INJECTION INTRAVENOUS; SUBCUTANEOUS
Qty: 25000 | Refills: 0 | Status: DISCONTINUED | OUTPATIENT
Start: 2019-11-26 | End: 2019-11-26

## 2019-11-26 RX ORDER — HEPARIN SODIUM 5000 [USP'U]/ML
4400 INJECTION INTRAVENOUS; SUBCUTANEOUS EVERY 6 HOURS
Refills: 0 | Status: DISCONTINUED | OUTPATIENT
Start: 2019-11-26 | End: 2019-11-26

## 2019-11-26 RX ORDER — POTASSIUM CHLORIDE 20 MEQ
40 PACKET (EA) ORAL ONCE
Refills: 0 | Status: COMPLETED | OUTPATIENT
Start: 2019-11-26 | End: 2019-11-26

## 2019-11-26 RX ORDER — HEPARIN SODIUM 5000 [USP'U]/ML
4400 INJECTION INTRAVENOUS; SUBCUTANEOUS ONCE
Refills: 0 | Status: COMPLETED | OUTPATIENT
Start: 2019-11-26 | End: 2019-11-26

## 2019-11-26 RX ORDER — CLOPIDOGREL BISULFATE 75 MG/1
75 TABLET, FILM COATED ORAL DAILY
Refills: 0 | Status: DISCONTINUED | OUTPATIENT
Start: 2019-11-26 | End: 2019-11-26

## 2019-11-26 RX ADMIN — CLOPIDOGREL BISULFATE 75 MILLIGRAM(S): 75 TABLET, FILM COATED ORAL at 13:20

## 2019-11-26 RX ADMIN — HEPARIN SODIUM 750 UNIT(S)/HR: 5000 INJECTION INTRAVENOUS; SUBCUTANEOUS at 12:50

## 2019-11-26 RX ADMIN — HEPARIN SODIUM 0 UNIT(S)/HR: 5000 INJECTION INTRAVENOUS; SUBCUTANEOUS at 12:18

## 2019-11-26 RX ADMIN — Medication 40 MILLIEQUIVALENT(S): at 10:49

## 2019-11-26 RX ADMIN — Medication 81 MILLIGRAM(S): at 13:20

## 2019-11-26 RX ADMIN — HEPARIN SODIUM 900 UNIT(S)/HR: 5000 INJECTION INTRAVENOUS; SUBCUTANEOUS at 05:53

## 2019-11-26 RX ADMIN — HEPARIN SODIUM 4400 UNIT(S): 5000 INJECTION INTRAVENOUS; SUBCUTANEOUS at 05:52

## 2019-11-26 NOTE — H&P CARDIOLOGY - PSH
History of cardiac monitoring  loop monitor  S/P breast biopsy, left    S/P cystoscopy    S/P D&C    Status post cataract extraction  OD History of cardiac monitoring  loop monitor  removed 7/22/19  S/P breast biopsy, left    S/P cystoscopy    S/P D&C    Status post cataract extraction  OD

## 2019-11-26 NOTE — H&P ADULT - HISTORY OF PRESENT ILLNESS
86 y/o female with PMH of CAD, NSTEMI s/p PCI with 2 stents (RCA and LCx) in April 2019, bladder cancer, AAA (4.0 cm), HTN (currently not on any medications) who presents with complaint of chest pain. Patient reports that she was out shopping yesterday afternoon when she started experiencing some achy pain to her midepigastric region, radiating across the left upper side of her abdomen to her back. She said that the episode lasted about 20 minutes, at which point she felt better without any intervention and was able to drive home and eat dinner without any complaints. She says that she then experienced the same pain again associated with some shortness of breath last night that did not resolve on its own, at which point she decided to have her daughter and  drive her to the emergency room. She states that she has never had similar pain in the past, and that when she had her NSTEMI in April, she did not experience any pain aat all and only had some associated nausea and vomiting, with some very mild epigastric pain. Denies any jaw pain, shoulder pain, nausea, vomiting, or palpitations.     As per chart review, patient was seen by Dr Riley in September 2019; she had been complaining of some episodic chest pain with some associated BECERRA. A nuclear stress test was performed which showed nonspecific repolarization changes with no ECG abnormalities, LVEF of 45%. Her baseline ECG has always shown nonspecific T wave abnormalities. Patient denies any orthopnea or PND.

## 2019-11-26 NOTE — CHART NOTE - NSCHARTNOTEFT_GEN_A_CORE
s/p LHC via RFA  Case discussed with Dr. Vee  distal OM disease- no intervention at this time  Cont DAPT  o/p follow up with Dr. Vallecillo

## 2019-11-26 NOTE — H&P CARDIOLOGY - HISTORY OF PRESENT ILLNESS
86 y/o female with PMH of CAD, NSTEMI s/p PCI with 2 stents (RCA and LCx) in April 2019, bladder cancer s/p cystoscopy, AAA (4.0 cm) serial CT scans, HTN presents to [place] ER c/o chest pain on 11/26/19. Patient reports that she was out shopping yesterday afternoon when she started experiencing midsternal chest  discomfort, radiating across the left upper side of her abdomen radiating to her back. Lasted 20 minutes, spontaneously resolved. Later that day, she experienced similar symptoms now associated with some shortness of breath- went to ER accompanied by . In ER, ECG NSR with new ST T wave depression in lateral leads, Trop + 1.730--> 4.250, Heparin gtt started for ACS      Of Note: As per chart review, patient was seen by Dr Riley in September 2019; she had been complaining of some episodic chest pain with some associated BECERRA. A nuclear stress test was performed which showed nonspecific repolarization changes with no ECG abnormalities, LVEF of 45%. Her baseline ECG has always shown nonspecific T wave abnormalities. 86 y/o female with PMH of CAD, NSTEMI s/p PCI with 2 stents (RCA and LCx) in April 2019, bladder cancer s/p cystoscopy, AAA (4.0 cm) serial CT scans, HTN presents to [place] ER c/o chest pain on 11/26/19. Patient reports that she was out shopping yesterday afternoon when she started experiencing midsternal chest  discomfort, radiating across the left upper side of her abdomen radiating to her back. Lasted 20 minutes, spontaneously resolved. Later that day, she experienced similar symptoms now associated with some shortness of breath- went to ER accompanied by . In ER, ECG NSR with new ST T wave depression in lateral leads, Trop + 1.730 --> 4.250, Heparin gtt started for ACS given 325mg ASA and plavix 75mg- NOT LOADED. Patient transferred to [facility] for cardiac angiogram for further ischemic evaluation     Of Note: As per chart review, patient was seen by Dr Riley in September 2019; she had been complaining of some episodic chest pain with some associated BECERRA. A nuclear stress test was performed which showed nonspecific repolarization changes with no ECG abnormalities, LVEF of 45%. Her baseline ECG has always shown nonspecific T wave abnormalities. 86 y/o female with PMH of CAD, NSTEMI s/p PCI with 2 stents (RCA and LCx) in April 29, 2019, bladder cancer s/p cystoscopy, AAA (4.0 cm) serial CT scans, HTN, loop explant 7/22/19 presents to [place] ER c/o chest pain on 11/26/19. Patient reports that she was out shopping yesterday afternoon when she started experiencing midsternal chest  discomfort, radiating across the left upper side of her abdomen radiating to her back. Lasted 20 minutes, spontaneously resolved. Later that day, she experienced similar symptoms now associated with some shortness of breath- went to ER accompanied by . In ER, ECG NSR with new ST T wave depression in lateral leads, Trop + 1.730 --> 4.250, Heparin gtt started for ACS given 325mg ASA and plavix 75mg. Patient transferred to [facility] for cardiac angiogram for further ischemic evaluation     cards: Osvaldo   Of Note: As per chart review, patient was seen by Dr Riley in September 2019; she had been complaining of some episodic chest pain with some associated BECERRA. A nuclear stress test was performed which showed nonspecific repolarization changes with no ECG abnormalities, LVEF of 45%. Her baseline ECG has always shown nonspecific T wave abnormalities.      4/29/19 code stroke post cardiac cath c/o LUE weakness, NIHSS score 0, symptoms resolved next day. CT head negative

## 2019-11-26 NOTE — H&P ADULT - NSHPREVIEWOFSYSTEMS_GEN_ALL_CORE
Constitutional: denies fever, chills, diaphoresis   HEENT: denies blurry vision, difficulty hearing  Respiratory: denies SOB, BECERRA, cough, sputum production, wheezing, hemoptysis  Cardiovascular: denies chest pain, palpitations, edema  Gastrointestinal: denies nausea, vomiting, diarrhea, constipation, abdominal pain, melena, hematochezia   Genitourinary: denies dysuria, frequency, urgency, hematuria   Skin/Breast: denies rash, itching  Musculoskeletal: denies myalgias, joint swelling, muscle weakness  Neurologic: denies headache, weakness, dizziness, paresthesias, numbness/tingling  Psychiatric: denies feeling anxious, depressed, suicidal, homicidal thoughts  Hematology/Oncology: denies bruising, tender or enlarged lymph nodes   ROS negative except as noted above

## 2019-11-26 NOTE — H&P CARDIOLOGY - PATIENT REFERRED TO
FHT's heard today- 159!  Patient and  excited.  Still on prometrium 100 mg hs with plans to stop at 14 weeks.   No bleeding or cramping.  Feeling much less nauseated.   Urine obtained today for GC/Clamy cx.  Declines any genetic testing.   Discussed establishing your care team/ co-effective maynor and encouraged to download.   A * indicates this has been reviewed with the patient (This is simply a checklist of what has been completed with the patient, it is not to be used for formal documentation of education):    _x__ Encouraged to download Coffective Mobile Maynor  _____ We're Prepared Checklist  _____  Motivation/intro document  _____  Build Your Team  _____ Fall in Love  _____ Get Ready  _____ Keep Baby Close  _____ Learn Your Baby  _____ Nourish  _____ Protect Breastfeeding  RTC 1 month.     
Cardiac catherization with possible intervention

## 2019-11-26 NOTE — H&P ADULT - NSICDXPASTMEDICALHX_GEN_ALL_CORE_FT
PAST MEDICAL HISTORY:  AAA (abdominal aortic aneurysm)     Back pain, chronic occasional lower back pain    Benign paroxysmal vertigo of right ear history of right ear abscess    Bladder cancer dx 2012 treated with BCG    Coronary artery disease of native artery of native heart with stable angina pectoris     History of loop recorder placed by Dr. DHARMESH Ibarra. Removed 2019    HTN (hypertension)     Syncope, unspecified syncope type

## 2019-11-26 NOTE — CONSULT NOTE ADULT - ASSESSMENT
86 y/o female with PMH of CAD, NSTEMI s/p PCI with 2 stents (RCA and LCx) in April 2019, bladder cancer, AAA (4.0 cm), HTN (currently not on any medications) who presents with complaint of chest pain, found to have elevated cardiac enzymes, nonspecific ST and T wave abnormalities noted.     - Patient is not complaining of any cardiac symptoms at this time, denies any chest pain currently. She is comfortable on 2 L NC.   - First set of trops negative, second set positive with Troponin I 1.730, CPK 10.8, CKMB 15.4, Creatine kinase 142   - ECG   - No meaningful evidence of volume overload.  - Previous TTE shows ___.  - BP well controlled, monitor routine hemodynamics.  - Continue ___.  - Monitor and replete lytes, keep K>4, Mg>2.  - Strict I/Os, daily weights.  - Other cardiovascular workup will depend on clinical course.  - All other workup per primary team.  - Will continue to follow. 84 y/o female with PMH of CAD, NSTEMI s/p PCI with 2 stents (RCA and LCx) in April 2019, bladder cancer, AAA (4.0 cm), HTN (currently not on any medications) who presents with complaint of chest pain, found to have elevated cardiac enzymes, nonspecific ST and T wave abnormalities noted.     - Patient is not complaining of any cardiac symptoms at this time, denies any chest pain currently. She is comfortable on 2 L NC.   - First set of trops negative, second set positive with Troponin I 1.730, CPK 10.8, CKMB 15.4, Creatine kinase 142   - ECG: slightly increased nonspecific ST changes in leads V4-V6  - At this point, would recommend a cardiac cath, especially given history and after speaking to Dr Riley. Patient has a complicated history, she had previously failed Brilinta and is currently on ASA and Plavix, was unable to tolerate BB and Acei due to her BP dropping too low. She hjas a history of intermittent chest pain that has previously been worked up with stress tests that have been normal, however given elevated cardiac enzymes and nonspecific ST changes on ECG , it would be in the patient's best interest to be transferred to [place] for cardiac cath.   - No meaningful evidence of volume overload at this time.   - BP well controlled, monitor routine hemodynamics.  - Other cardiovascular workup will depend on clinical course.  - All other workup per primary team.  - Will continue to follow. 86 y/o female with PMH of CAD, NSTEMI s/p PCI with 2 stents (RCA and LCx) in April 2019, bladder cancer, AAA (4.0 cm), HTN (currently not on any medications) who presents with complaint of chest pain, found to have elevated cardiac enzymes, nonspecific ST and T wave abnormalities noted.     - Patient is not complaining of any cardiac symptoms at this time, denies any chest pain currently. She is comfortable on 2 L NC.   - First set of trops negative, second set positive with Troponin I 1.730, CPK 10.8, CKMB 15.4, Creatine kinase 142   - ECG: slightly increased nonspecific ST changes in leads V4-V6  - At this point, would recommend a cardiac cath, especially given history and after speaking to Dr Riley. Patient has a complicated history, she had previously failed Brilinta and is currently on ASA and Plavix, was unable to tolerate BB and Acei due to her BP dropping too low. She has a history of intermittent chest pain that has previously been worked up with stress tests that have been normal, however given elevated cardiac enzymes and nonspecific ST changes on ECG , it would be in the patient's best interest to be transferred to [place] for cardiac cath.   -Plan for cath was discussed at length with patient and her family, it was strongly recommended to her that she transferred for cath. However, after discussing the issue with her family, she decided against immediate transfer for cath. All risks of not having cath done immediately were discussed with patient/family and patient and family expressed understanding. She is agreeable to being admitted for observation at this point.  -Admit patient to telemetry   -Continue heparin gtt, ASA, low dose ACE inhibitor, patient is slightly bradycardic and BB would not be recommended currently   -Replete K (s/p KCl 40 x 1), replete electrolytes as needed, keep K > 4 and Mg >2   -serial ECGs   - No meaningful evidence of volume overload at this time.   - BP well controlled, monitor routine hemodynamics.  - Other cardiovascular workup will depend on clinical course.  - All other workup per primary team.  - Will continue to follow. 84 y/o female with PMH of CAD, NSTEMI s/p PCI with 2 stents (RCA and LCx) in April 2019, bladder cancer, AAA (4.0 cm), HTN (currently not on any medications) who presents with complaint of chest pain, found to have elevated cardiac enzymes, nonspecific ST and T wave abnormalities noted.     - Patient is not complaining of any cardiac symptoms at this time, denies any chest pain currently. She is comfortable on 2 L NC.   - First set of trops negative, second set positive with Troponin I 1.730, CPK 10.8, CKMB 15.4, Creatine kinase 142  -Continue to trend cardiac enzymes to peak   - ECG: slightly increased nonspecific ST changes in leads V4-V6  - At this point, would recommend a cardiac cath, especially given history and after speaking to Dr Riley. Patient has a complicated history, she had previously failed Brilinta and is currently on ASA and Plavix, was unable to tolerate BB and Acei due to her BP dropping too low. She has a history of intermittent chest pain that has previously been worked up with stress tests that have been normal, however given elevated cardiac enzymes and nonspecific ST changes on ECG , it would be in the patient's best interest to be transferred to [place] for cardiac cath.   -Plan for cath was discussed at length with patient and her family, it was strongly recommended to her that she transferred for cath. However, after discussing the issue with her family, she decided against immediate transfer for cath. All risks of not having cath done immediately were discussed with patient/family and patient and family expressed understanding. She is agreeable to being admitted for observation at this point.  -Admit patient to telemetry   -Continue heparin gtt, ASA, low dose ACE inhibitor, patient is slightly bradycardic and BB would not be recommended currently   -Replete K (s/p KCl 40 x 1), replete electrolytes as needed, keep K > 4 and Mg >2   -serial ECGs   - No meaningful evidence of volume overload at this time.   - BP well controlled, monitor routine hemodynamics.  - Other cardiovascular workup will depend on clinical course.  - All other workup per primary team.  - Will continue to follow. 84 y/o female with PMH of CAD, NSTEMI s/p PCI with 2 stents (RCA and LCx) in April 2019, bladder cancer, AAA (4.0 cm), HTN (currently not on any medications) who presents with complaint of chest pain, found to have elevated cardiac enzymes, nonspecific ST and T wave abnormalities noted.     - Patient is not complaining of any cardiac symptoms at this time, denies any chest pain currently. She is comfortable on 2 L NC.   - First set of trops negative, second set positive with Troponin I 1.730, CPK 10.8, CKMB 15.4, Creatine kinase 142  -Continue to trend cardiac enzymes to peak   - ECG: slightly increased nonspecific ST changes in leads V4-V6 when compared to previous ECG done as outpatient with Dr Riley in September 2019  - At this point, would recommend a cardiac cath, especially given history and after speaking to Dr Riley. Patient has a complicated history, she had previously failed Brilinta and is currently on ASA and Plavix, was unable to tolerate BB and Acei due to her BP dropping too low. She has a history of intermittent chest pain that has previously been worked up with stress tests that have been normal, however given elevated cardiac enzymes and nonspecific ST changes on ECG , it would be in the patient's best interest to be transferred to [place] for cardiac cath.   -Plan for cath was discussed at length with patient and her family, it was strongly recommended to her that she transferred for cath. However, after discussing the issue with her family, she decided against immediate transfer for cath. All risks of not having cath done immediately were discussed with patient/family and patient and family expressed understanding. She is agreeable to being admitted for observation at this point.  -Admit patient to telemetry   -Continue heparin gtt, ASA, low dose ACE inhibitor, patient is slightly bradycardic and BB would not be recommended currently   -Replete K (s/p KCl 40 x 1), replete electrolytes as needed, keep K > 4 and Mg >2   -serial ECGs   - No meaningful evidence of volume overload at this time.   - BP well controlled, monitor routine hemodynamics.  - Other cardiovascular workup will depend on clinical course.  - All other workup per primary team.  - Will continue to follow. 86 y/o female with PMH of CAD, NSTEMI s/p PCI with 2 stents (RCA and LCx) in April 2019, bladder cancer, AAA (4.0 cm), HTN (currently not on any medications) who presents with complaint of chest pain, found to have elevated cardiac enzymes, nonspecific ST and T wave abnormalities noted.     - Patient is not complaining of any cardiac symptoms at this time, denies any chest pain currently. She is comfortable on 2 L NC.   - First set of trops negative, second set positive with Troponin I 1.730, CPK 10.8, CKMB 15.4, Creatine kinase 142  -Continue to trend cardiac enzymes to peak   - ECG: slightly increased nonspecific ST changes in leads V4-V6 when compared to previous ECG done as outpatient with Dr Riley in September 2019  - At this point, patient likely has NSTEMI and we would recommend a cardiac cath, especially given history and after speaking to Dr Riley. Patient has a complicated history, she had previously failed Brilinta and is currently on ASA and Plavix, was unable to tolerate BB and Acei due to her BP dropping too low. She has a history of intermittent chest pain that has previously been worked up with stress tests that have been normal, however given elevated cardiac enzymes and nonspecific ST changes on ECG , it would be in the patient's best interest to be transferred to [place] for cardiac cath.   -Plan for cath was discussed at length with patient and her family, it was strongly recommended to her that she transferred for cath. However, after discussing the issue with her family, she decided against immediate transfer for cath. All risks of not having cath done immediately were discussed with patient/family and patient and family expressed understanding. She is agreeable to being admitted for observation at this point.  -Admit patient to telemetry   -Continue heparin gtt, ASA, low dose ACE inhibitor, patient is slightly bradycardic and BB would not be recommended currently   -Replete K (s/p KCl 40 x 1), replete electrolytes as needed, keep K > 4 and Mg >2   -serial ECGs   - No meaningful evidence of volume overload at this time.   - BP well controlled, monitor routine hemodynamics.  - Other cardiovascular workup will depend on clinical course.  - All other workup per primary team.  - Will continue to follow. 84 y/o female with PMH of CAD, NSTEMI s/p PCI with 2 stents (RCA and LCx) in April 2019, bladder cancer, AAA (4.0 cm), HTN (currently not on any medications) who presents with complaint of chest pain, found to have elevated cardiac enzymes, nonspecific ST and T wave abnormalities noted.     - Patient is not complaining of any cardiac symptoms at this time, denies any chest pain currently. She is comfortable on 2 L NC.   - First set of trops negative, second set positive with Troponin I 1.730, CPK 10.8, CKMB 15.4, Creatine kinase 142  - Continue to trend cardiac enzymes to peak   - ECG: slightly increased nonspecific ST changes in leads V4-V6 when compared to previous ECG done as outpatient with Dr Riley in September 2019  - At this point, patient likely has NSTEMI and we would recommend a cardiac cath, especially given history and after speaking to Dr Riley. Patient has a complicated history, she had previously failed Brilinta and is currently on ASA and Plavix, was unable to tolerate BB and Acei due to her BP dropping too low. She has a history of intermittent chest pain that has previously been worked up with stress tests that have been normal, however given elevated cardiac enzymes and nonspecific ST changes on ECG , it would be in the patient's best interest to be transferred to [place] for cardiac cath.   -Plan for cath was discussed at length with patient and her family, it was strongly recommended to her that she transferred for cath. However, after discussing the issue with her family, she decided against immediate transfer for cath. All risks of not having cath done immediately were discussed with patient/family and patient and family expressed understanding. She is agreeable to being admitted for observation at this point.  -Admit patient to telemetry   -Continue heparin gtt, ASA, low dose ACE inhibitor, patient is slightly bradycardic and BB would not be recommended currently   -Replete K (s/p KCl 40 x 1), replete electrolytes as needed, keep K > 4 and Mg >2   -serial ECGs   - No meaningful evidence of volume overload at this time.   - BP well controlled, monitor routine hemodynamics.  - Other cardiovascular workup will depend on clinical course.  - All other workup per primary team.  - Will continue to follow.

## 2019-11-26 NOTE — ED ADULT NURSE REASSESSMENT NOTE - NS ED NURSE REASSESS COMMENT FT1
Pt is resting in bed. CE is normal now. Repeat CE at 4.30am. Family at bedside. On Cardiac monitor. Will ctm.
Pt is resting in bed. Repeat Trop is 1.73. Results and plan of care explained to family and pt by Dr. Martin. Repeat EKG done. Dr. Burroughs will see pt in morning. Heparin bolus and infusion to be given. Vitals stable. Repeat coag labs sent. Will ctm. On monitor.
Pt is sleeping in bed. Family at bedside. Vitals stable. On monitor. Will ctm.
Pt walked to the bathroom. Vitals checked and recorded. SPO2 maintaining well on O2 by NC. Will ctm.
pt stable.  Vs wnl.  Heparin infusion stopped for 30 mins and to be decreased by 1.5.  Ptt to be redrawn.  Pt possible wants transfer for ACS.  Right not pt has been refusing transfer with education.  Ongoing nursing care and safety maintained.
Pt being transferred.  Asprin/plavix given.  Heparin drip continued.  Vs WNL.  Ongoing nursing care and safety maintained.
Received pt in bed from [facility] alert and oriented x4.  Pt resting comfortably at this time.  Awaiting cardio consult.  Heparin infusion going 9ml/hr as per ACS nomogram.  Ongoing nursing care and safety maintained.

## 2019-11-26 NOTE — H&P ADULT - NSHPSOCIALHISTORY_GEN_ALL_CORE
Lives with  and daughter   Walks w/o assitance   Able to perform ADL independently   Former smoker (quit 30 years ago, previously 1.5 ppd)   Occasional alcohol use (special occasions, no daily use)   No illicit drug use   No flu shot (not interested)

## 2019-11-26 NOTE — H&P ADULT - NSICDXFAMILYHX_GEN_ALL_CORE_FT
FAMILY HISTORY:  Family history of diabetes mellitus  Family history of hypertension  No pertinent family history in first degree relatives    Sibling  Still living? No  Family history of MI (myocardial infarction), Age at diagnosis: Age Unknown

## 2019-11-26 NOTE — H&P ADULT - PROBLEM SELECTOR PLAN 1
- Admit to tele   - Continue heparin gtt   - For transfer to [place] for Cath tomorrow   - Cardio recs appreciated (Dr. Burroughs) Pt presenting with chest discomfort. 1st set CE wnl, 2nd set CE elevated (Trop 1.730, CK 15.4). EKG shows non specific ST segment changes. Had recent cath in 4/19.   - Admit to tele   - Continue heparin gtt   - For transfer to [place] for Cath tomorrow   - Cardio recs appreciated (Dr. Burroughs)-  heparin gtt, ASA, low dose ACE inhibitor  - Fall risk precautions   - Ambulate with assistance

## 2019-11-26 NOTE — H&P CARDIOLOGY - FAMILY HISTORY
Family history of diabetes mellitus     Family history of hypertension     No pertinent family history in first degree relatives     Sibling  Still living? No  Family history of MI (myocardial infarction), Age at diagnosis: Age Unknown

## 2019-11-26 NOTE — CONSULT NOTE ADULT - SUBJECTIVE AND OBJECTIVE BOX
Patient is a 85y old  Female who presents with a chief complaint of chest pain     HPI:  86 y/o female with PMH of CAD, NSTEMI s/p PCI with 2 stents (RCA and LCx) in April 2019, bladder cancer, AAA (4.0 cm), HTN (currently not on any medications) who presents with complaint of chest pain. Patient reports that she was out shopping yesterday afternoon when she started experiencing some achy pain to her midepigastric region, radiating across the left upper side of her abdomen to her back. She said that the episode lasted about 20 minutes, at which point she felt better without any intervention and was able to drive home and eat dinner without any complaints. She says that she then experienced the same pain again associated with some shortness of breath last night that did not resolve on its own, at which point she decided to have her daughter and  drive her to the emergency room. She states that she has never had similar pain in the past, and that when she had her NSTEMI in April, she did not experience any pain aat all and only had some associated nausea and vomiting, with some very mild epigastric pain. Denies any jaw pain, shoulder pain, nausea, vomiting, or palpitations.     As per chart review, patient was seen by Dr Riley in September 2019; she had been complaining of some episodic chest pain with some associated BECERRA. A nuclear stress test was performed which showed nonspecific repolarization changes with no ECG abnormalities, LVEF of 45%. Her baseline ECG has always shown nonspecific T wave abnormalities. Patient denies any orthopnea or PND.     PAST MEDICAL & SURGICAL HISTORY:  History of loop recorder: placed by Dr. DHARMESH Ibarra  Syncope, unspecified syncope type  AAA (abdominal aortic aneurysm)  Coronary artery disease of native artery of native heart with stable angina pectoris  HTN (hypertension)  Back pain, chronic: occasional lower back pain  Benign paroxysmal vertigo of right ear: history of right ear abscess  Bladder cancer: dx 2012 treated with BCG  Status post cataract extraction: OD  History of cardiac monitoring: loop monitor  S/P cystoscopy  S/P D&C  S/P breast biopsy, left             MEDICATIONS  (STANDING):  heparin  Infusion.  Unit(s)/Hr (9 mL/Hr) IV Continuous <Continuous>    MEDICATIONS  (PRN):  heparin  Injectable 4400 Unit(s) IV Push every 6 hours PRN For aPTT less than 40      FAMILY HISTORY:  No pertinent family history in first degree relatives  Family history of MI (myocardial infarction) (Sibling): at 59 years of age  Family history of hypertension  Family history of diabetes mellitus    Denies Family history of CAD or early MI      Constitutional: denies fever, chills  HEENT: denies blurry vision, difficulty hearing  Respiratory: denies SOB, BECERRA, cough  Cardiovascular: denies CP, palpitations, orthopnea, PND, LE edema  Gastrointestinal: denies nausea, vomiting, abdominal pain  Genitourinary: denies urinary changes  Skin: Denies rashes, itching  Neurologic: denies headache, weakness, dizziness  Hematology/Oncology: denies bleeding, easy bruising  ROS negative except as noted above      SOCIAL HISTORY:    Former smoker , smoked 1.5 ppd for greater than 30 years, quit 30 years ago, denies Alcohol or drug use    Vital Signs Last 24 Hrs  T(C): 36.7 (26 Nov 2019 07:14), Max: 36.9 (26 Nov 2019 05:45)  T(F): 98 (26 Nov 2019 07:14), Max: 98.4 (26 Nov 2019 05:45)  HR: 57 (26 Nov 2019 07:14) (55 - 92)  BP: 131/58 (26 Nov 2019 07:14) (126/67 - 197/81)  RR: 16 (26 Nov 2019 07:14) (15 - 19)  SpO2: 95% (26 Nov 2019 07:14) (95% - 98%)    Physical Exam:  General: Well developed, well nourished, NAD  HEENT: NCAT, PERRLA, EOMI bl, moist mucous membranes   Neck: Supple, nontender, no mass  Neurology: A&Ox3, nonfocal, sensation intact   Respiratory: CTA B/L, No W/R/R  CV: RRR, +S1/S2, no murmurs, rubs or gallops  Abdominal: Soft, NT, ND +BSx4, no palpable masses  Extremities: No C/C/E, + peripheral pulses  MSK: Normal ROM, no joint erythema or warmth, no joint swelling   Heme: No obvious ecchymosis or petechiae   Skin: warm, dry, normal color      ECG:    I&O's Detail      LABS:                        13.1   6.59  )-----------( 170      ( 25 Nov 2019 22:49 )             40.8     11-25    143  |  109<H>  |  23  ----------------------------<  159<H>  3.4<L>   |  28  |  0.83    Ca    8.8      25 Nov 2019 22:49    TPro  7.0  /  Alb  3.9  /  TBili  0.3  /  DBili  x   /  AST  21  /  ALT  18  /  AlkPhos  76  11-25    CARDIAC MARKERS ( 26 Nov 2019 04:25 )  1.730 ng/mL / x     / 142 U/L / x     / 15.4 ng/mL  CARDIAC MARKERS ( 25 Nov 2019 22:49 )  .038 ng/mL / x     / 73 U/L / x     / x          PT/INR - ( 26 Nov 2019 06:03 )   PT: 16.8 sec;   INR: 1.47 ratio         PTT - ( 26 Nov 2019 06:03 )  PTT:31.2 sec    I&O's Summary Patient is a 85y old  Female who presents with a chief complaint of chest pain     HPI:  86 y/o female with PMH of CAD, NSTEMI s/p PCI with 2 stents (RCA and LCx) in April 2019, bladder cancer, AAA (4.0 cm), HTN (currently not on any medications) who presents with complaint of chest pain. Patient reports that she was out shopping yesterday afternoon when she started experiencing some achy pain to her midepigastric region, radiating across the left upper side of her abdomen to her back. She said that the episode lasted about 20 minutes, at which point she felt better without any intervention and was able to drive home and eat dinner without any complaints. She says that she then experienced the same pain again associated with some shortness of breath last night that did not resolve on its own, at which point she decided to have her daughter and  drive her to the emergency room. She states that she has never had similar pain in the past, and that when she had her NSTEMI in April, she did not experience any pain aat all and only had some associated nausea and vomiting, with some very mild epigastric pain. Denies any jaw pain, shoulder pain, nausea, vomiting, or palpitations.     As per chart review, patient was seen by Dr Riley in September 2019; she had been complaining of some episodic chest pain with some associated BECERRA. A nuclear stress test was performed which showed nonspecific repolarization changes with no ECG abnormalities, LVEF of 45%. Her baseline ECG has always shown nonspecific T wave abnormalities. Patient denies any orthopnea or PND.     PAST MEDICAL & SURGICAL HISTORY:  History of loop recorder: placed by Dr. DHARMESH Ibarra  Syncope, unspecified syncope type  AAA (abdominal aortic aneurysm)  Coronary artery disease of native artery of native heart with stable angina pectoris  HTN (hypertension)  Back pain, chronic: occasional lower back pain  Benign paroxysmal vertigo of right ear: history of right ear abscess  Bladder cancer: dx 2012 treated with BCG  Status post cataract extraction: OD  History of cardiac monitoring: loop monitor  S/P cystoscopy  S/P D&C  S/P breast biopsy, left             MEDICATIONS  (STANDING):  heparin  Infusion.  Unit(s)/Hr (9 mL/Hr) IV Continuous <Continuous>    MEDICATIONS  (PRN):  heparin  Injectable 4400 Unit(s) IV Push every 6 hours PRN For aPTT less than 40      FAMILY HISTORY:  No pertinent family history in first degree relatives  Family history of MI (myocardial infarction) (Sibling): at 59 years of age  Family history of hypertension  Family history of diabetes mellitus    Denies Family history of CAD or early MI      Constitutional: denies fever, chills  HEENT: denies blurry vision, difficulty hearing  Respiratory: denies SOB, BECERRA, cough  Cardiovascular: denies CP, palpitations, orthopnea, PND, LE edema  Gastrointestinal: denies nausea, vomiting, abdominal pain  Genitourinary: denies urinary changes  Skin: Denies rashes, itching  Neurologic: denies headache, weakness, dizziness  Hematology/Oncology: denies bleeding, easy bruising  ROS negative except as noted above      SOCIAL HISTORY:    Former smoker , smoked 1.5 ppd for greater than 30 years, quit 30 years ago, denies Alcohol or drug use    Vital Signs Last 24 Hrs  T(C): 36.7 (26 Nov 2019 07:14), Max: 36.9 (26 Nov 2019 05:45)  T(F): 98 (26 Nov 2019 07:14), Max: 98.4 (26 Nov 2019 05:45)  HR: 57 (26 Nov 2019 07:14) (55 - 92)  BP: 131/58 (26 Nov 2019 07:14) (126/67 - 197/81)  RR: 16 (26 Nov 2019 07:14) (15 - 19)  SpO2: 95% (26 Nov 2019 07:14) (95% - 98%)    Physical Exam:  General: Well developed, well nourished, NAD  HEENT: NCAT, PERRLA, EOMI bl, moist mucous membranes   Neck: Supple, nontender, no mass  Neurology: A&Ox3, nonfocal, sensation intact   Respiratory: CTA B/L, No W/R/R  CV: RRR, +S1/S2, no murmurs, rubs or gallops  Abdominal: Soft, NT, ND +BSx4, no palpable masses  Extremities: No C/C/E, + peripheral pulses  MSK: Normal ROM, no joint erythema or warmth, no joint swelling   Heme: No obvious ecchymosis or petechiae   Skin: warm, dry, normal color      ECG: SR with nonspecific ST changes    I&O's Detail      LABS:                        13.1   6.59  )-----------( 170      ( 25 Nov 2019 22:49 )             40.8     11-25    143  |  109<H>  |  23  ----------------------------<  159<H>  3.4<L>   |  28  |  0.83    Ca    8.8      25 Nov 2019 22:49    TPro  7.0  /  Alb  3.9  /  TBili  0.3  /  DBili  x   /  AST  21  /  ALT  18  /  AlkPhos  76  11-25    CARDIAC MARKERS ( 26 Nov 2019 04:25 )  1.730 ng/mL / x     / 142 U/L / x     / 15.4 ng/mL  CARDIAC MARKERS ( 25 Nov 2019 22:49 )  .038 ng/mL / x     / 73 U/L / x     / x          PT/INR - ( 26 Nov 2019 06:03 )   PT: 16.8 sec;   INR: 1.47 ratio         PTT - ( 26 Nov 2019 06:03 )  PTT:31.2 sec    I&O's Summary

## 2019-11-26 NOTE — H&P ADULT - ASSESSMENT
86 y/o female with PMH of CAD, NSTEMI s/p PCI with 2 stents (RCA and LCx) in April 2019, bladder cancer, AAA (4.0 cm), HTN (currently not on any medications) who presents with complaint of chest pain. Admitted for NSTEMI.

## 2019-11-26 NOTE — H&P CARDIOLOGY - PMH
AAA (abdominal aortic aneurysm)    Back pain, chronic  occasional lower back pain  Benign paroxysmal vertigo of right ear  history of right ear abscess  Bladder cancer  dx 2012 treated with BCG  Coronary artery disease of native artery of native heart with stable angina pectoris    History of loop recorder  placed by Dr. DHARMESH Ibarra. Removed 2019  HTN (hypertension)    Syncope, unspecified syncope type

## 2019-11-26 NOTE — CONSULT NOTE ADULT - ATTENDING COMMENTS
I saw and examined the patient personally. Spoke with above provider regarding this case. I reviewed the above findings completely.  I agree with the above history, physical, and plan which I have edited where appropriate.     I spend a great deal of time explaining pts situation. the pt initially refused cardiac cath and preferred med management. But when trops now increased to 4 pt would like to have cath done. Risk (including but not limited to periprocedureal MI and death), benefits, alternatives fully explained to the patient. The patient agrees to the procedure and I will arrange to it to be done.   cont hep gtt. I personally have made the arrangements for transfer.

## 2019-11-26 NOTE — H&P ADULT - NSHPPHYSICALEXAM_GEN_ALL_CORE
Physical Exam:  General: Well developed, well nourished, NAD  HEENT: NCAT, PERRLA, EOMI bl, moist mucous membranes   Neck: Supple, nontender, no mass  Neurology: A&Ox3, nonfocal, CN II-XII grossly intact, sensation intacT  Respiratory: CTA B/L, No W/R/R  CV: RRR, +S1/S2, no murmurs, rubs or gallops  Abdominal: Soft, NT, ND +BSx4  Extremities: No C/C/E, + peripheral pulses  MSK: Normal ROM, no joint erythema or warmth, no joint swelling   Skin: warm, dry, normal color, no rash or abnormal lesions Vital Signs Last 24 Hrs  T(C): 36.7 (26 Nov 2019 07:14), Max: 36.9 (26 Nov 2019 05:45)  T(F): 98 (26 Nov 2019 07:14), Max: 98.4 (26 Nov 2019 05:45)  HR: 75 (26 Nov 2019 11:55) (55 - 92)  BP: 168/77 (26 Nov 2019 12:01) (126/67 - 197/81)  BP(mean): --  RR: 16 (26 Nov 2019 11:55) (15 - 19)  SpO2: 95% (26 Nov 2019 11:55) (95% - 98%)    Physical Exam:  General: Well developed, well nourished, NAD  HEENT: NCAT, PERRLA, EOMI bl, moist mucous membranes   Neck: Supple, nontender, no mass  Neurology: A&Ox3, nonfocal, CN II-XII grossly intact, sensation intacT  Respiratory: CTA B/L, No W/R/R  CV: RRR, +S1/S2, no murmurs, rubs or gallops  Abdominal: Soft, NT, ND +BSx4  Extremities: No C/C/E, + peripheral pulses  MSK: Normal ROM, no joint erythema or warmth, no joint swelling   Skin: warm, dry, normal color, no rash or abnormal lesions

## 2019-11-26 NOTE — ED ADULT NURSE REASSESSMENT NOTE - NSIMPLEMENTINTERV_GEN_ALL_ED
Implemented All Fall with Harm Risk Interventions:  [place] to call system. Call bell, personal items and telephone within reach. Instruct patient to call for assistance. Room bathroom lighting operational. Non-slip footwear when patient is off stretcher. Physically safe environment: no spills, clutter or unnecessary equipment. Stretcher in lowest position, wheels locked, appropriate side rails in place. Provide visual cue, wrist band, yellow gown, etc. Monitor gait and stability. Monitor for mental status changes and reorient to person, place, and time. Review medications for side effects contributing to fall risk. Reinforce activity limits and safety measures with patient and family. Provide visual clues: red socks.

## 2019-11-27 ENCOUNTER — TRANSCRIPTION ENCOUNTER (OUTPATIENT)
Age: 84
End: 2019-11-27

## 2019-11-27 VITALS
RESPIRATION RATE: 16 BRPM | OXYGEN SATURATION: 100 % | DIASTOLIC BLOOD PRESSURE: 66 MMHG | HEART RATE: 78 BPM | SYSTOLIC BLOOD PRESSURE: 128 MMHG | TEMPERATURE: 98 F

## 2019-11-27 LAB
ANION GAP SERPL CALC-SCNC: 10 MMOL/L — SIGNIFICANT CHANGE UP (ref 5–17)
BUN SERPL-MCNC: 14 MG/DL — SIGNIFICANT CHANGE UP (ref 7–23)
CALCIUM SERPL-MCNC: 9.2 MG/DL — SIGNIFICANT CHANGE UP (ref 8.4–10.5)
CHLORIDE SERPL-SCNC: 107 MMOL/L — SIGNIFICANT CHANGE UP (ref 96–108)
CO2 SERPL-SCNC: 23 MMOL/L — SIGNIFICANT CHANGE UP (ref 22–31)
CREAT SERPL-MCNC: 0.66 MG/DL — SIGNIFICANT CHANGE UP (ref 0.5–1.3)
GLUCOSE SERPL-MCNC: 144 MG/DL — HIGH (ref 70–99)
HCT VFR BLD CALC: 38 % — SIGNIFICANT CHANGE UP (ref 34.5–45)
HGB BLD-MCNC: 12.7 G/DL — SIGNIFICANT CHANGE UP (ref 11.5–15.5)
MAGNESIUM SERPL-MCNC: 1.9 MG/DL — SIGNIFICANT CHANGE UP (ref 1.6–2.6)
MCHC RBC-ENTMCNC: 30.9 PG — SIGNIFICANT CHANGE UP (ref 27–34)
MCHC RBC-ENTMCNC: 33.4 GM/DL — SIGNIFICANT CHANGE UP (ref 32–36)
MCV RBC AUTO: 92.5 FL — SIGNIFICANT CHANGE UP (ref 80–100)
NRBC # BLD: 0 /100 WBCS — SIGNIFICANT CHANGE UP (ref 0–0)
PLATELET # BLD AUTO: 159 K/UL — SIGNIFICANT CHANGE UP (ref 150–400)
POTASSIUM SERPL-MCNC: 3.9 MMOL/L — SIGNIFICANT CHANGE UP (ref 3.5–5.3)
POTASSIUM SERPL-SCNC: 3.9 MMOL/L — SIGNIFICANT CHANGE UP (ref 3.5–5.3)
RBC # BLD: 4.11 M/UL — SIGNIFICANT CHANGE UP (ref 3.8–5.2)
RBC # FLD: 14.1 % — SIGNIFICANT CHANGE UP (ref 10.3–14.5)
SODIUM SERPL-SCNC: 140 MMOL/L — SIGNIFICANT CHANGE UP (ref 135–145)
WBC # BLD: 8.13 K/UL — SIGNIFICANT CHANGE UP (ref 3.8–10.5)
WBC # FLD AUTO: 8.13 K/UL — SIGNIFICANT CHANGE UP (ref 3.8–10.5)

## 2019-11-27 PROCEDURE — C1769: CPT

## 2019-11-27 PROCEDURE — 83735 ASSAY OF MAGNESIUM: CPT

## 2019-11-27 PROCEDURE — 99239 HOSP IP/OBS DSCHRG MGMT >30: CPT

## 2019-11-27 PROCEDURE — 93005 ELECTROCARDIOGRAM TRACING: CPT

## 2019-11-27 PROCEDURE — 85027 COMPLETE CBC AUTOMATED: CPT

## 2019-11-27 PROCEDURE — 93454 CORONARY ARTERY ANGIO S&I: CPT

## 2019-11-27 PROCEDURE — 80048 BASIC METABOLIC PNL TOTAL CA: CPT

## 2019-11-27 PROCEDURE — C1887: CPT

## 2019-11-27 RX ORDER — MAGNESIUM OXIDE 400 MG ORAL TABLET 241.3 MG
400 TABLET ORAL ONCE
Refills: 0 | Status: DISCONTINUED | OUTPATIENT
Start: 2019-11-27 | End: 2019-11-27

## 2019-11-27 RX ORDER — RANOLAZINE 500 MG/1
500 TABLET, FILM COATED, EXTENDED RELEASE ORAL
Refills: 0 | Status: DISCONTINUED | OUTPATIENT
Start: 2019-11-27 | End: 2019-11-27

## 2019-11-27 RX ORDER — POTASSIUM CHLORIDE 20 MEQ
20 PACKET (EA) ORAL ONCE
Refills: 0 | Status: COMPLETED | OUTPATIENT
Start: 2019-11-27 | End: 2019-11-27

## 2019-11-27 RX ORDER — METOPROLOL TARTRATE 50 MG
25 TABLET ORAL DAILY
Refills: 0 | Status: DISCONTINUED | OUTPATIENT
Start: 2019-11-27 | End: 2019-11-27

## 2019-11-27 RX ORDER — METOPROLOL TARTRATE 50 MG
1 TABLET ORAL
Qty: 30 | Refills: 0
Start: 2019-11-27 | End: 2019-12-26

## 2019-11-27 RX ADMIN — RANOLAZINE 500 MILLIGRAM(S): 500 TABLET, FILM COATED, EXTENDED RELEASE ORAL at 07:44

## 2019-11-27 RX ADMIN — Medication 20 MILLIEQUIVALENT(S): at 09:46

## 2019-11-27 RX ADMIN — CLOPIDOGREL BISULFATE 75 MILLIGRAM(S): 75 TABLET, FILM COATED ORAL at 05:26

## 2019-11-27 RX ADMIN — Medication 81 MILLIGRAM(S): at 05:25

## 2019-11-27 RX ADMIN — Medication 1 TABLET(S): at 05:26

## 2019-11-27 RX ADMIN — Medication 25 MILLIGRAM(S): at 07:44

## 2019-11-27 NOTE — DISCHARGE NOTE PROVIDER - NSDCCPCAREPLAN_GEN_ALL_CORE_FT
PRINCIPAL DISCHARGE DIAGNOSIS  Diagnosis: CAD (coronary artery disease), native coronary artery  Assessment and Plan of Treatment: No heavy lifting, strenuous activity, bending, straining, or unnecessary stair climbing for 2 weeks. No driving for 2 days. You may shower 24 hours following the procedure but avoid baths/swimming for 1 week. Check your groin site for bleeding and/or swelling daily following procedure and call your doctor immediately if it occurs or if you experience increased pain at the site. Follow up with your cardiologist in 1-2 weeks. You may call [facility] if you have any questions/concerns regarding your procedure [phone]. PRINCIPAL DISCHARGE DIAGNOSIS  Diagnosis: CAD (coronary artery disease), native coronary artery  Assessment and Plan of Treatment: Low salt, low fat diet.   Weight management.   Take medications as prescribed.    No smoking.  Follow up appointments with your doctor(s)  as instructed.      SECONDARY DISCHARGE DIAGNOSES  Diagnosis: NSVT (nonsustained ventricular tachycardia)  Assessment and Plan of Treatment: Continue your Metoprolol

## 2019-11-27 NOTE — DISCHARGE NOTE PROVIDER - NSDCFUADDINST_GEN_ALL_CORE_FT
No heavy lifting, strenuous activity, bending, straining, or unnecessary stair climbing for 2 weeks. No driving for 2 days. You may shower 24 hours following the procedure but avoid baths/swimming for 1 week. Check your groin site for bleeding and/or swelling daily following procedure and call your doctor immediately if it occurs or if you experience increased pain at the site. Follow up with your cardiologist in 1-2 weeks. You may call [facility] if you have any questions/concerns regarding your procedure [phone].

## 2019-11-27 NOTE — DISCHARGE NOTE PROVIDER - NSDCPNSUBOBJ_GEN_ALL_CORE
Patient is a 85y old  Female who presents with a chief complaint of chest pain                Allergies        adhesives (Other)    latex (Rash)    No Known Drug Allergies        Intolerances        statins (Muscle Pain)            Medications:    aspirin enteric coated 81 milliGRAM(s) Oral daily    clopidogrel Tablet 75 milliGRAM(s) Oral daily    multivitamin/minerals 1 Tablet(s) Oral daily            Vitals:    T(C): 37.1 (11-26-19 @ 20:05), Max: 37.1 (11-26-19 @ 20:05)    HR: 74 (11-26-19 @ 21:05) (55 - 97)    BP: 147/65 (11-26-19 @ 21:05) (126/67 - 168/83)    BP(mean): 96 (11-26-19 @ 15:31) (96 - 96)    RR: 17 (11-26-19 @ 21:05) (14 - 18)    SpO2: 98% (11-26-19 @ 21:05) (95% - 100%)    Wt(kg): --    Daily Height in cm: 160.02 (26 Nov 2019 15:22)      Daily     I&O's Summary        26 Nov 2019 07:01  -  27 Nov 2019 02:55    --------------------------------------------------------    IN: 480 mL / OUT: 0 mL / NET: 480 mL                    Physical Exam:    Appearance: Normal    Eyes: PERRL, EOMI    HENT: Normal oral [place], NC/AT    Cardiovascular: S1S2, RRR, No M/R/G, no JVD, No Lower extremity edema    Procedural Access Site: No hematoma, Non-tender to palpation, 2+ pulse, No bruit, No Ecchymosis    Respiratory: Clear to auscultation bilaterally    Gastrointestinal: Soft, Non tender, Normal Bowel Sounds    Musculoskeletal: No clubbing, No joint deformity     Neurologic: Non-focal    Lymphatic: No lymphadenopathy    Psychiatry: AAOx3, Mood & affect appropriate    Skin: No rashes, No ecchymoses, No cyanosis        11-27        140  |  107  |  14    ----------------------------<  144<H>    3.9   |  23  |  0.66        Ca    9.2      27 Nov 2019 02:07        TPro  7.0  /  Alb  3.9  /  TBili  0.3  /  DBili  x   /  AST  21  /  ALT  18  /  AlkPhos  76  11-25        PT/INR - ( 26 Nov 2019 06:03 )   PT: 16.8 sec;   INR: 1.47 ratio               PTT - ( 26 Nov 2019 11:49 )  PTT:89.8 sec    CARDIAC MARKERS ( 26 Nov 2019 11:29 )    4.250 ng/mL / x     / 169 U/L / x     / 20.4 ng/mL    CARDIAC MARKERS ( 26 Nov 2019 04:25 )    1.730 ng/mL / x     / 142 U/L / x     / 15.4 ng/mL    CARDIAC MARKERS ( 25 Nov 2019 22:49 )    .038 ng/mL / x     / 73 U/L / x     / x                    Lipid panel     Hgb A1c                             12.7     8.13  )-----------( 159      ( 27 Nov 2019 02:07 )               38.0                 ECG: SR 76 bpm        Cath: distal OM 90%, medical management        Imaging:        Interpretation of Telemetry:                CAD    Monitor groin site for swelling, bleeding    Continue ASA, Plavix    Follow up with Dr. Riley as outpatient

## 2019-11-27 NOTE — DISCHARGE NOTE PROVIDER - NSDCMRMEDTOKEN_GEN_ALL_CORE_FT
aspirin 81 mg oral delayed release tablet: 1 tab(s) orally once a day  h/h  Centrum oral tablet: 1 tab(s) orally once a day  Plavix 75 mg oral tablet: 1 tab(s) orally once a day  h/h aspirin 81 mg oral delayed release tablet: 1 tab(s) orally once a day  h/h  Centrum oral tablet: 1 tab(s) orally once a day  metoprolol succinate 25 mg oral tablet, extended release: 1 tab(s) orally once a day  Plavix 75 mg oral tablet: 1 tab(s) orally once a day  h/h

## 2019-11-27 NOTE — PROGRESS NOTE ADULT - SUBJECTIVE AND OBJECTIVE BOX
[facility] -- Melania Gallo, Jose, Osvaldo, Manjeet Carrillo Savella  Office # [phone]      Follow Up:  CAD, NSTEMI    Subjective/Observations: Patient seen and examined. Events noted. Resting comfortably in bed. No complaints of chest pain, dyspnea, or palpitations reported. No signs of orthopnea or PND.       REVIEW OF SYSTEMS: All other review of systems is negative unless indicated above    PAST MEDICAL & SURGICAL HISTORY:  Neurological complaint: 19 code stroke post cardiac cath c/o LUE weakness, NIHSS score 0, symptoms reolved next day  History of loop recorder: placed by Dr. DHARMESH Ibarra. Removed   Syncope, unspecified syncope type  AAA (abdominal aortic aneurysm)  Coronary artery disease of native artery of native heart with stable angina pectoris  HTN (hypertension)  Back pain, chronic: occasional lower back pain  Benign paroxysmal vertigo of right ear: history of right ear abscess  Bladder cancer: dx  treated with BCG  Status post cataract extraction: OD  History of cardiac monitoring: loop monitor  removed 19  S/P cystoscopy  S/P D&C  S/P breast biopsy, left      MEDICATIONS  (STANDING):  aspirin enteric coated 81 milliGRAM(s) Oral daily  clopidogrel Tablet 75 milliGRAM(s) Oral daily  magnesium oxide 400 milliGRAM(s) Oral once  metoprolol succinate ER 25 milliGRAM(s) Oral daily  multivitamin/minerals 1 Tablet(s) Oral daily  potassium chloride    Tablet ER 20 milliEquivalent(s) Oral once  ranolazine 500 milliGRAM(s) Oral two times a day    MEDICATIONS  (PRN):      Allergies    adhesives (Other)  latex (Rash)  No Known Drug Allergies    Intolerances    statins (Muscle Pain)          Vital Signs Last 24 Hrs  T(C): 36.8 (2019 05:19), Max: 37.1 (2019 20:05)  T(F): 98.3 (2019 05:19), Max: 98.8 (2019 20:05)  HR: 76 (2019 05:19) (61 - 97)  BP: 130/62 (2019 07:44) (130/62 - 168/83)  BP(mean): 96 (2019 15:31) (96 - 96)  RR: 16 (2019 07:44) (14 - 18)  SpO2: 100% (2019 07:44) (95% - 100%)    I&O's Summary    2019 07:  -  2019 07:00  --------------------------------------------------------  IN: 600 mL / OUT: 0 mL / NET: 600 mL    2019 07:01  -  2019 09:18  --------------------------------------------------------  IN: 120 mL / OUT: 0 mL / NET: 120 mL      Weight (kg): 72.6 ( @ 15:22)    PHYSICAL EXAM:  TELE:   Constitutional: NAD, awake and alert, well-developed  HEENT: Moist Mucous Membranes, Anicteric  Pulmonary: Non-labored, breath sounds are clear bilaterally, No wheezing, rales or rhonchi  Cardiovascular: Regular, S1 and S2, 2/6 SM  Gastrointestinal: Bowel Sounds present, soft, nontender.   Lymph: No peripheral edema. No lymphadenopathy.  Skin: No visible rashes or ulcers.  Psych:  Mood & affect appropriate for situation    LABS: All Labs Reviewed:                        12.7   8.13  )-----------( 159      ( 2019 02:07 )             38.0                         12.5   7.91  )-----------( 163      ( 2019 11:29 )             38.3                         13.1   6.59  )-----------( 170      ( 2019 22:49 )             40.8     2019 02:07    140    |  107    |  14     ----------------------------<  144    3.9     |  23     |  0.66   2019 11:29    144    |  110    |  18     ----------------------------<  115    3.6     |  29     |  0.67   2019 22:49    143    |  109    |  23     ----------------------------<  159    3.4     |  28     |  0.83     Ca    9.2        2019 02:07  Ca    8.8        2019 11:29  Ca    8.8        2019 22:49  Mg     1.9       2019 02:07    TPro  7.0    /  Alb  3.9    /  TBili  0.3    /  DBili  x      /  AST  21     /  ALT  18     /  AlkPhos  76     2019 22:49    PT/INR - ( 2019 06:03 )   PT: 16.8 sec;   INR: 1.47 ratio         PTT - ( 2019 11:49 )  PTT:89.8 sec  CARDIAC MARKERS ( 2019 11:29 )  4.250 ng/mL / x     / 169 U/L / x     / 20.4 ng/mL  CARDIAC MARKERS ( 2019 04:25 )  1.730 ng/mL / x     / 142 U/L / x     / 15.4 ng/mL  CARDIAC MARKERS ( 2019 22:49 )  .038 ng/mL / x     / 73 U/L / x     / x           < from: Cardiac Cath Lab - Adult (19 @ 16:19) >    [facility]  Department of Cardiology  [address] [place] [address]  [phone]  Cath Lab Report -- Comprehensive Report  Patient: VANNESA HICKS  Study date: 2019  Account number: [id number]  MR number: [id number]  : 1934  Gender: Female  Race: W  Case Physician(s):  Michael Vee M.D.  Referring Physician:  GALLO Harden M.D.  INDICATIONS: Unstable angina - CCS4.  HISTORY: The patient has a history of coronary artery disease. The patient  has hypertension and medication-treated dyslipidemia.  PROCEDURE:  --  Left coronary angiography.  --  Right coronary angiography.  TECHNIQUE: Oxygen 2 L/min. The risks and alternatives of the procedures and  conscious sedation were explained to the patient and informed consent was  obtained. Cardiac catheterization performed electively.  Local anesthetic given. Right femoral artery access. Left coronary artery  angiography. The vessel was injected utilizing a catheter. Right coronary  artery angiography. The vessel was injected utilizing a catheter.  RADIATION EXPOSURE: 1.23 min.  CONTRAST GIVEN: Omnipaque 23 ml.  MEDICATIONS GIVEN: Midazolam, 0.5 mg, IV. Fentanyl, 25 mcg, IV.  CORONARY VESSELS: The coronary circulation is right dominant.  LM:   --  LM: Angiography showed minor luminal irregularities with no flow  limiting lesions.  LAD:   --  LAD: Angiography showed minor luminal irregularities with no  flow limiting lesions.  CX:   --  OM1: There was a 90 % stenosis.  RCA:   --  RCA: Angiography showed minor luminal irregularities with no  flow limiting lesions.  --  Mid RCA: There was a 20 % stenosis.  COMPLICATIONS: There were no complications.  DIAGNOSTIC RECOMMENDATIONS: The patient had a negative stress test and  presented with chest pain to the ED. The OM is distal and would recommend  medical rx first. If no improvement then would consider PCI of OM1 The  patient should continue with the present medications.  INTERVENTIONAL RECOMMENDATIONS: The patient had a negative stress test and  presented with chest pain to the ED. The OM is distal and would recommend  medical rx first. If no improvement then would consider PCI of OM1  Prepared and signed by  Michael Vee M.D.  Signed 2019 16:42:02  HEMODYNAMIC TABLES  Outputs:  Baseline  Outputs:  -- CALCULATIONS[age]  Outputs:  -- CALCULATIONS: Body Surface Area: 1.76  Outputs:  -- CALCULATIONS: Height in cm: 160.00  Outputs:  -- CALCULATIONS: Sex: Female  Outputs:  -- CALCULATIONS: Weight in k.60    < end of copied text >

## 2019-11-27 NOTE — DISCHARGE NOTE NURSING/CASE MANAGEMENT/SOCIAL WORK - PATIENT PORTAL LINK FT
You can access the FollowMyHealth Patient Portal offered by [facility] by registering at the following website: [email or web address]. By joining [employer]’s FollowMyHealth portal, you will also be able to view your health information using other applications (apps) compatible with our system.

## 2019-11-27 NOTE — PROGRESS NOTE ADULT - ASSESSMENT
84 y/o female with PMH of CAD, NSTEMI s/p PCI with 2 stents (RCA and LCx) in April 2019, bladder cancer, AAA (4.0 cm), HTN (currently not on any medications) presents with PV with another NSTEMI    - Tolerated cath well  - Has a worsening distal OM lesion that at this point is felt to be best medically managed. Stents are patent  - I would start back Toprol 25mg Qday as BP should be able to tolerate.   - Add Ranexa 500mg q12  - cont DAPT and statin  - Will f/u with Dr. Riley within 2 weeks.   spent 30 mins

## 2019-11-27 NOTE — DISCHARGE NOTE PROVIDER - CARE PROVIDER_API CALL
Miguel Riley)  Cardiovascular Disease  [address] [place] [address]  Phone: [phone]  Fax: [phone]  Follow Up Time: Miguel Riley)  Cardiovascular Disease  [address] [place] [address]  Phone: [phone]  Fax: [phone]  Established Patient  Follow Up Time: 2 weeks

## 2019-11-27 NOTE — DISCHARGE NOTE PROVIDER - PROVIDER TOKENS
PROVIDER:[TOKEN:[[id number]]] PROVIDER:[TOKEN:[[id number]],FOLLOWUP:[2 weeks],ESTABLISHEDPATIENT:[T]]

## 2019-11-27 NOTE — DISCHARGE NOTE PROVIDER - NSDCCPTREATMENT_GEN_ALL_CORE_FT
PRINCIPAL PROCEDURE  Procedure: Left heart cardiac cath  Findings and Treatment: medical management of the distal OM

## 2019-11-27 NOTE — DISCHARGE NOTE PROVIDER - HOSPITAL COURSE
HPI:    86 y/o female with PMH of CAD, NSTEMI s/p PCI with 2 stents (RCA and LCx) in April 29, 2019, bladder cancer s/p cystoscopy, AAA (4.0 cm) serial CT scans, HTN, loop explant 7/22/19 presents to [place] ER c/o chest pain on 11/26/19. Patient reports that she was out shopping yesterday afternoon when she started experiencing midsternal chest  discomfort, radiating across the left upper side of her abdomen radiating to her back. Lasted 20 minutes, spontaneously resolved. Later that day, she experienced similar symptoms now associated with some shortness of breath- went to ER accompanied by . In ER, ECG NSR with new ST T wave depression in lateral leads, Trop + 1.730 --> 4.250, Heparin gtt started for ACS given 325mg ASA and plavix 75mg. Patient transferred to [facility] for cardiac angiogram for further ischemic evaluation         cardiology: Dr. VIC Riley     Of Note: As per chart review, patient was seen by Dr Riley in September 2019; she had been complaining of some episodic chest pain with some associated BECERRA. A nuclear stress test was performed which showed nonspecific repolarization changes with no ECG abnormalities, LVEF of 45%. Her baseline ECG has always shown nonspecific T wave abnormalities.         11/26 cardiac cath with distal OM 90%, no intervention, will do medical management. Right groin site without swellling, bleeding. HPI:    86 y/o female with PMH of CAD, NSTEMI s/p PCI with 2 stents (RCA and LCx) in April 29, 2019, bladder cancer s/p cystoscopy, AAA (4.0 cm) serial CT scans, HTN, loop explant 7/22/19 presents to [place] ER c/o chest pain on 11/26/19. Patient reports that she was out shopping yesterday afternoon when she started experiencing midsternal chest  discomfort, radiating across the left upper side of her abdomen radiating to her back. Lasted 20 minutes, spontaneously resolved. Later that day, she experienced similar symptoms now associated with some shortness of breath- went to ER accompanied by . In ER, ECG NSR with new ST T wave depression in lateral leads, Trop + 1.730 --> 4.250, Heparin gtt started for ACS given 325mg ASA and plavix 75mg. Patient transferred to [facility] for cardiac angiogram for further ischemic evaluation         cardiology: Dr. VIC Riley     Of Note: As per chart review, patient was seen by Dr Riley in September 2019; she had been complaining of some episodic chest pain with some associated BECERRA. A nuclear stress test was performed which showed nonspecific repolarization changes with no ECG abnormalities, LVEF of 45%. Her baseline ECG has always shown nonspecific T wave abnormalities.         11/26 cardiac cath with distal OM 90%, no intervention, will do medical management. Right groin site without swellling, bleeding.        Pt HPI:    84 y/o female with PMH of CAD, NSTEMI s/p PCI with 2 stents (RCA and LCx) in April 29, 2019, bladder cancer s/p cystoscopy, AAA (4.0 cm) serial CT scans, HTN, loop explant 7/22/19 presents to [place] ER c/o chest pain on 11/26/19. Patient reports that she was out shopping yesterday afternoon when she started experiencing midsternal chest  discomfort, radiating across the left upper side of her abdomen radiating to her back. Lasted 20 minutes, spontaneously resolved. Later that day, she experienced similar symptoms now associated with some shortness of breath- went to ER accompanied by . In ER, ECG NSR with new ST T wave depression in lateral leads, Trop + 1.730 --> 4.250, Heparin gtt started for ACS given 325mg ASA and plavix 75mg. Patient transferred to [facility] for cardiac angiogram for further ischemic evaluation         cardiology: Dr. VIC Riley     Of Note: As per chart review, patient was seen by Dr Riley in September 2019; she had been complaining of some episodic chest pain with some associated BECERRA. A nuclear stress test was performed which showed nonspecific repolarization changes with no ECG abnormalities, LVEF of 45%. Her baseline ECG has always shown nonspecific T wave abnormalities.         11/26 cardiac cath with distal OM 90%, no intervention, will do medical management. Right groin site without swellling, bleeding.        Pt with tachycardia up to 107 during the night and WCT 8 beats and multiform couplet was started on Toprol 25mg and Ranexa 500mg had 1 episode of explosive Diarrhea post doses.  Ranexa was d/c'd no further episodes of diarrhea nor ectopy since last episode.  Feeling much better.  Discussed with Dr. Burroughs and pt cleared for d/c home and f/u with Dr. Riley in 2 weeks.

## 2019-12-02 PROBLEM — Z98.890 OTHER SPECIFIED POSTPROCEDURAL STATES: Chronic | Status: ACTIVE | Noted: 2019-04-29

## 2019-12-09 ENCOUNTER — NON-APPOINTMENT (OUTPATIENT)
Age: 84
End: 2019-12-09

## 2019-12-09 ENCOUNTER — APPOINTMENT (OUTPATIENT)
Dept: CARDIOLOGY | Facility: CLINIC | Age: 84
End: 2019-12-09
Payer: MEDICARE

## 2019-12-09 VITALS
WEIGHT: 160 LBS | HEIGHT: 63 IN | OXYGEN SATURATION: 98 % | HEART RATE: 55 BPM | BODY MASS INDEX: 28.35 KG/M2 | DIASTOLIC BLOOD PRESSURE: 78 MMHG | SYSTOLIC BLOOD PRESSURE: 152 MMHG

## 2019-12-09 PROCEDURE — 93000 ELECTROCARDIOGRAM COMPLETE: CPT

## 2019-12-09 PROCEDURE — 99215 OFFICE O/P EST HI 40 MIN: CPT

## 2019-12-09 NOTE — DISCUSSION/SUMMARY
[FreeTextEntry1] : Adry has recently been found to have CAD, not ideally suited for PCI. She does not have any residual anginal symptoms at this time, and hopefully things will stay quiet. I have prescribed nitroglycerin to be taken as needed. Noting her CAD as well as her elevated blood pressures, I have also represcribed amlodipine, 5 mg daily. Hopefully, her blood pressure will tolerate this, without difficulty.\par \par She is due for a followup ultrasound of her abdominal aneurysm. She will schedule this.\par \par She has been intolerant to all statins that have been tried, including atorvastatin, and simvastatin. We have requested that she get approval for injectable lipid-lowering therapy.\par \par She will see me again in one month.

## 2019-12-09 NOTE — PHYSICAL EXAM
[Normal Appearance] : normal appearance [General Appearance - In No Acute Distress] : no acute distress [Normal Conjunctiva] : the conjunctiva exhibited no abnormalities [Normal Jugular Venous V Waves Present] : normal jugular venous V waves present [FreeTextEntry1] : No JVD [Normal Oral Mucosa] : normal oral mucosa [Respiration, Rhythm And Depth] : normal respiratory rhythm and effort [Exaggerated Use Of Accessory Muscles For Inspiration] : no accessory muscle use [Auscultation Breath Sounds / Voice Sounds] : lungs were clear to auscultation bilaterally [Abdomen Soft] : soft [Abdomen Tenderness] : non-tender [Abdomen Mass (___ Cm)] : no abdominal mass palpated [Abnormal Walk] : normal gait [Cyanosis, Localized] : no localized cyanosis [Nail Clubbing] : no clubbing of the fingernails [Skin Color & Pigmentation] : normal skin color and pigmentation [] : no rash [Petechial Hemorrhages (___cm)] : no petechial hemorrhages [No Venous Stasis] : no venous stasis [Affect] : the affect was normal [Oriented To Time, Place, And Person] : oriented to person, place, and time [Skin Lesions] : no skin lesions [Mood] : the mood was normal [No Anxiety] : not feeling anxious [Not Palpable] : not palpable [No Precordial Heave] : no precordial heave was noted [Rhythm Regular] : regular [Normal Rate] : normal [Normal S2] : normal S2 [Normal S1] : normal S1 [No Murmur] : no murmurs heard [No Gallop] : no gallop heard [Right Carotid Bruit] : right carotid bruit heard [Left Carotid Bruit] : left carotid bruit heard [1+] : left 1+ [Bruit] : no bruit heard [No Pitting Edema] : no pitting edema present

## 2019-12-09 NOTE — HISTORY OF PRESENT ILLNESS
[FreeTextEntry1] : Adry presented to the office today for a followup evaluation. She was last seen in the office  3 months ago.\par \par She is now 85 years old, with a history of bladder cancer. She has a history of  hypertension, with meds discontinued when her pressure dropped. In May of 2016, she had an episode of syncope.  While hospitalized, she was observed to have nonsustained ventricular tachycardia. She was transferred for further evaluation. Nuclear stress testing and echocardiography were unremarkable. A loop recorder was implanted, and more recently explanted. She has a 4.0 cm AAA, which has been followed. She has had a myocardial infarction, for which she had PCI of the circumflex and the right coronary artery. \par \par She presented in September with chest discomfort.   She described the symptoms as being similar but not identical to the symptoms which she was experiencing with her myocardial infarction.  Nuclear stress testing revealed fixed inferior and inferolateral defects, suggestive of infarct, with an ejection fraction of 45%. We decided to try to medically manage. Unfortunately, at the end of November she presented to the hospital with unstable angina. Cardiac catheterization revealed a severe stenosis distal within a small caliber obtuse marginal, which does serve a relatively significant myocardial territory. It was felt most appropriate to try medical therapy, with PCI reserved only for failure of medical therapy.\par \par She presented to the office today having been feeling well since her presentation. She has not had any more chest pains. She denies any dyspnea of significance. Her blood pressure has been in the 130s were 140s. She denies palpitations, dizziness and syncope.

## 2019-12-13 LAB
CHOLEST SERPL-MCNC: 209 MG/DL
CHOLEST/HDLC SERPL: 3.5 RATIO
HDLC SERPL-MCNC: 60 MG/DL
LDLC SERPL CALC-MCNC: 127 MG/DL
TRIGL SERPL-MCNC: 109 MG/DL

## 2019-12-19 ENCOUNTER — APPOINTMENT (OUTPATIENT)
Dept: CARDIOLOGY | Facility: CLINIC | Age: 84
End: 2019-12-19
Payer: MEDICARE

## 2019-12-19 PROCEDURE — 93978 VASCULAR STUDY: CPT

## 2019-12-23 ENCOUNTER — MEDICATION RENEWAL (OUTPATIENT)
Age: 84
End: 2019-12-23

## 2019-12-30 ENCOUNTER — MEDICATION RENEWAL (OUTPATIENT)
Age: 84
End: 2019-12-30

## 2020-01-03 ENCOUNTER — RX CHANGE (OUTPATIENT)
Age: 85
End: 2020-01-03

## 2020-01-07 ENCOUNTER — OTHER (OUTPATIENT)
Age: 85
End: 2020-01-07

## 2020-01-29 ENCOUNTER — APPOINTMENT (OUTPATIENT)
Dept: CARDIOLOGY | Facility: CLINIC | Age: 85
End: 2020-01-29
Payer: MEDICARE

## 2020-01-29 ENCOUNTER — NON-APPOINTMENT (OUTPATIENT)
Age: 85
End: 2020-01-29

## 2020-01-29 VITALS
HEIGHT: 63 IN | OXYGEN SATURATION: 94 % | HEART RATE: 68 BPM | SYSTOLIC BLOOD PRESSURE: 134 MMHG | WEIGHT: 160 LBS | BODY MASS INDEX: 28.35 KG/M2 | DIASTOLIC BLOOD PRESSURE: 77 MMHG

## 2020-01-29 PROCEDURE — 99214 OFFICE O/P EST MOD 30 MIN: CPT

## 2020-01-29 PROCEDURE — 93000 ELECTROCARDIOGRAM COMPLETE: CPT

## 2020-01-29 NOTE — HISTORY OF PRESENT ILLNESS
[FreeTextEntry1] : Adry presented to the office today for a followup evaluation. She was last seen in the office 2 months ago.\par \par She is now 85 years old, with a history of bladder cancer. She has a history of  hypertension, with meds discontinued when her pressure dropped. In May of 2016, she had an episode of syncope.  While hospitalized, she was observed to have nonsustained ventricular tachycardia. She was transferred for further evaluation. Nuclear stress testing and echocardiography were unremarkable. A loop recorder was implanted, and more recently explanted. She has a 4.0 cm AAA, which has been followed. She has had a myocardial infarction, for which she had PCI of the circumflex and the right coronary artery. \par \par She presented in September with chest discomfort.   She described the symptoms as being similar but not identical to the symptoms which she was experiencing with her myocardial infarction.  Nuclear stress testing revealed fixed inferior and inferolateral defects, suggestive of infarct, with an ejection fraction of 45%. We decided to try to medically manage. Unfortunately, at the end of November she presented to the hospital with unstable angina. Cardiac catheterization revealed a severe stenosis distal within a small caliber obtuse marginal, which does serve a relatively significant myocardial territory. It was felt most appropriate to try medical therapy, with PCI reserved only for failure of medical therapy.\par \par At the time of the last visit, she did not report symptoms of angina. Since then unfortunately, she has developed some symptoms of chest discomfort. She describes a sense of discomfort when she walks too much, especially in recent colder weather. She also may experienced this after eating. She does not experience this at rest. She has been under a great deal of emotional stress, as her  fell, and suffered a hip fracture, requiring surgery. His course was further complicated by influenza.\par \par She has been experiencing edema as well, though this has predominantly been nonpitting. We have discussed the possibility of trying some furosemide from her , but she never did so.

## 2020-01-29 NOTE — DISCUSSION/SUMMARY
[FreeTextEntry1] : Adry has recently been found to have CAD, not ideally suited for PCI. She did not have any residual anginal symptoms at that time, but she seemingly does now.  She will use nitro as needed, and will start Imdur 60.  She will come back to the office in one month, and we will determine whether the isosorbide has had any effect, and whether she would benefit from a higher dose.\par \par She does not have any pitting edema, and I don't think furosemide as needed at this time.\par \par Have suggested a followup in one month. We discussed her results of her abdominal ultrasound. Given the somewhat accelerated growth in her abdominal aortic aneurysm, we will repeat the examination in May.

## 2020-01-29 NOTE — PHYSICAL EXAM
[Normal Appearance] : normal appearance [General Appearance - In No Acute Distress] : no acute distress [Normal Conjunctiva] : the conjunctiva exhibited no abnormalities [Normal Oral Mucosa] : normal oral mucosa [FreeTextEntry1] : No JVD [Normal Jugular Venous V Waves Present] : normal jugular venous V waves present [Respiration, Rhythm And Depth] : normal respiratory rhythm and effort [Exaggerated Use Of Accessory Muscles For Inspiration] : no accessory muscle use [Auscultation Breath Sounds / Voice Sounds] : lungs were clear to auscultation bilaterally [Abdomen Soft] : soft [Abdomen Tenderness] : non-tender [Abnormal Walk] : normal gait [Abdomen Mass (___ Cm)] : no abdominal mass palpated [Nail Clubbing] : no clubbing of the fingernails [Cyanosis, Localized] : no localized cyanosis [Skin Color & Pigmentation] : normal skin color and pigmentation [Petechial Hemorrhages (___cm)] : no petechial hemorrhages [] : no rash [Skin Lesions] : no skin lesions [No Venous Stasis] : no venous stasis [Oriented To Time, Place, And Person] : oriented to person, place, and time [Mood] : the mood was normal [Affect] : the affect was normal [No Anxiety] : not feeling anxious [Not Palpable] : not palpable [Normal Rate] : normal [No Precordial Heave] : no precordial heave was noted [Rhythm Regular] : regular [Normal S1] : normal S1 [Normal S2] : normal S2 [No Murmur] : no murmurs heard [No Gallop] : no gallop heard [Right Carotid Bruit] : right carotid bruit heard [Left Carotid Bruit] : left carotid bruit heard [Bruit] : no bruit heard [No Pitting Edema] : no pitting edema present [1+] : left 1+

## 2020-03-04 ENCOUNTER — APPOINTMENT (OUTPATIENT)
Dept: CARDIOLOGY | Facility: CLINIC | Age: 85
End: 2020-03-04
Payer: MEDICARE

## 2020-03-04 ENCOUNTER — NON-APPOINTMENT (OUTPATIENT)
Age: 85
End: 2020-03-04

## 2020-03-04 VITALS
BODY MASS INDEX: 28.17 KG/M2 | DIASTOLIC BLOOD PRESSURE: 61 MMHG | SYSTOLIC BLOOD PRESSURE: 126 MMHG | HEART RATE: 53 BPM | OXYGEN SATURATION: 94 % | WEIGHT: 159 LBS | HEIGHT: 63 IN

## 2020-03-04 PROCEDURE — 99214 OFFICE O/P EST MOD 30 MIN: CPT

## 2020-03-04 PROCEDURE — 93000 ELECTROCARDIOGRAM COMPLETE: CPT

## 2020-03-04 NOTE — HISTORY OF PRESENT ILLNESS
[FreeTextEntry1] : Adry presented to the office today for a followup evaluation. She was last seen in the office 6 weeks ago.\par \par She is now 85 years old, with a history of bladder cancer. She has a history of  hypertension, with meds discontinued when her pressure dropped. In May of 2016, she had an episode of syncope.  While hospitalized, she was observed to have nonsustained ventricular tachycardia. She was transferred for further evaluation. Nuclear stress testing and echocardiography were unremarkable. A loop recorder was implanted, and more recently explanted. She has a 4.0 cm AAA, which has been followed. She has had a myocardial infarction, for which she had PCI of the circumflex and the right coronary artery. \par \par She presented in September with chest discomfort.   She described the symptoms as being similar but not identical to the symptoms which she was experiencing with her myocardial infarction.  Nuclear stress testing revealed fixed inferior and inferolateral defects, suggestive of infarct, with an ejection fraction of 45%. We decided to try to medically manage. Unfortunately, at the end of November she presented to the hospital with unstable angina. Cardiac catheterization revealed a severe stenosis distal within a small caliber obtuse marginal, which does serve a relatively significant myocardial territory. It was felt most appropriate to try medical therapy, with PCI reserved only for failure of medical therapy.\par \par At the time of the last visit, she reported a degree of angina. I prescribed isosorbide mononitrate 60 mg daily, and I asked her to return to the office.\par \par She has been feeling well. She does not report any symptoms of recurrent angina. She denies orthopnea, PND. She has a tendency toward lower extremity edema, which is generally unchanged. She denies palpitations, dizziness and syncope.

## 2020-03-04 NOTE — DISCUSSION/SUMMARY
[FreeTextEntry1] : Adry has recently been found to have CAD, not ideally suited for PCI. She did not have any residual anginal symptoms at that time, but she seemingly does now.  She will use nitro as needed, and will continue Imdur 60.  It seems to be working well.\par \par  \par \par She has mild pitting edema, but I don't think furosemide as needed at this time. We will keep a close eye on this.\par \par Have suggested a followup in 3 months.  Given the somewhat accelerated growth in her abdominal aortic aneurysm, we will repeat the examination in May.

## 2020-03-19 RX ORDER — AMLODIPINE BESYLATE 5 MG/1
5 TABLET ORAL DAILY
Qty: 90 | Refills: 3 | Status: DISCONTINUED | COMMUNITY
Start: 2017-10-23 | End: 2020-03-19

## 2020-04-15 LAB
CHOLEST SERPL-MCNC: 143 MG/DL
CHOLEST/HDLC SERPL: 2.1 RATIO
HDLC SERPL-MCNC: 68 MG/DL
LDLC SERPL CALC-MCNC: 52 MG/DL
TRIGL SERPL-MCNC: 115 MG/DL

## 2020-04-20 ENCOUNTER — RX RENEWAL (OUTPATIENT)
Age: 85
End: 2020-04-20

## 2020-05-01 NOTE — ED PROVIDER NOTE - PROGRESS NOTE
Stable. Burow's Graft Text: The defect edges were debeveled with a #15 scalpel blade.  Given the location of the defect, shape of the defect, the proximity to free margins and the presence of a standing cone deformity a Burow's skin graft was deemed most appropriate. The standing cone was removed and this tissue was then trimmed to the shape of the primary defect. The adipose tissue was also removed until only dermis and epidermis were left.  The skin margins of the secondary defect were undermined to an appropriate distance in all directions utilizing iris scissors.  The secondary defect was closed with interrupted buried subcutaneous sutures.  The skin edges were then re-apposed with running  sutures.  The skin graft was then placed in the primary defect and oriented appropriately.

## 2020-07-01 LAB
CHOLEST SERPL-MCNC: 133 MG/DL
CHOLEST/HDLC SERPL: 2 RATIO
HDLC SERPL-MCNC: 67 MG/DL
LDLC SERPL CALC-MCNC: 54 MG/DL
TRIGL SERPL-MCNC: 64 MG/DL

## 2020-08-03 ENCOUNTER — RX RENEWAL (OUTPATIENT)
Age: 85
End: 2020-08-03

## 2020-10-02 ENCOUNTER — TRANSCRIPTION ENCOUNTER (OUTPATIENT)
Age: 85
End: 2020-10-02

## 2020-10-02 ENCOUNTER — APPOINTMENT (OUTPATIENT)
Dept: CARDIOLOGY | Facility: CLINIC | Age: 85
End: 2020-10-02
Payer: MEDICARE

## 2020-10-02 ENCOUNTER — INPATIENT (INPATIENT)
Facility: HOSPITAL | Age: 85
LOS: 0 days | Discharge: HOME | DRG: 247 | End: 2020-10-03
Attending: INTERNAL MEDICINE | Admitting: INTERNAL MEDICINE
Payer: MEDICARE

## 2020-10-02 ENCOUNTER — NON-APPOINTMENT (OUTPATIENT)
Age: 85
End: 2020-10-02

## 2020-10-02 VITALS
HEIGHT: 63 IN | HEART RATE: 62 BPM | WEIGHT: 156 LBS | OXYGEN SATURATION: 96 % | DIASTOLIC BLOOD PRESSURE: 77 MMHG | SYSTOLIC BLOOD PRESSURE: 177 MMHG | BODY MASS INDEX: 27.64 KG/M2

## 2020-10-02 VITALS
OXYGEN SATURATION: 97 % | DIASTOLIC BLOOD PRESSURE: 77 MMHG | WEIGHT: 154.98 LBS | SYSTOLIC BLOOD PRESSURE: 180 MMHG | RESPIRATION RATE: 20 BRPM | HEIGHT: 63 IN | HEART RATE: 57 BPM | TEMPERATURE: 98 F

## 2020-10-02 DIAGNOSIS — Z92.89 PERSONAL HISTORY OF OTHER MEDICAL TREATMENT: Chronic | ICD-10-CM

## 2020-10-02 DIAGNOSIS — Z98.49 CATARACT EXTRACTION STATUS, UNSPECIFIED EYE: Chronic | ICD-10-CM

## 2020-10-02 DIAGNOSIS — R07.9 CHEST PAIN, UNSPECIFIED: ICD-10-CM

## 2020-10-02 LAB
ALBUMIN SERPL ELPH-MCNC: 4.6 G/DL — SIGNIFICANT CHANGE UP (ref 3.3–5)
ALP SERPL-CCNC: 65 U/L — SIGNIFICANT CHANGE UP (ref 40–120)
ALT FLD-CCNC: 10 U/L — SIGNIFICANT CHANGE UP (ref 10–45)
ANION GAP SERPL CALC-SCNC: 12 MMOL/L — SIGNIFICANT CHANGE UP (ref 5–17)
AST SERPL-CCNC: 16 U/L — SIGNIFICANT CHANGE UP (ref 10–40)
BASOPHILS # BLD AUTO: 0.01 K/UL — SIGNIFICANT CHANGE UP (ref 0–0.2)
BASOPHILS NFR BLD AUTO: 0.2 % — SIGNIFICANT CHANGE UP (ref 0–2)
BILIRUB SERPL-MCNC: 0.4 MG/DL — SIGNIFICANT CHANGE UP (ref 0.2–1.2)
BUN SERPL-MCNC: 22 MG/DL — SIGNIFICANT CHANGE UP (ref 7–23)
CALCIUM SERPL-MCNC: 9.7 MG/DL — SIGNIFICANT CHANGE UP (ref 8.4–10.5)
CHLORIDE SERPL-SCNC: 104 MMOL/L — SIGNIFICANT CHANGE UP (ref 96–108)
CO2 SERPL-SCNC: 25 MMOL/L — SIGNIFICANT CHANGE UP (ref 22–31)
CREAT SERPL-MCNC: 0.83 MG/DL — SIGNIFICANT CHANGE UP (ref 0.5–1.3)
EOSINOPHIL # BLD AUTO: 0.11 K/UL — SIGNIFICANT CHANGE UP (ref 0–0.5)
EOSINOPHIL NFR BLD AUTO: 1.9 % — SIGNIFICANT CHANGE UP (ref 0–6)
GLUCOSE SERPL-MCNC: 88 MG/DL — SIGNIFICANT CHANGE UP (ref 70–99)
HCT VFR BLD CALC: 42.7 % — SIGNIFICANT CHANGE UP (ref 34.5–45)
HGB BLD-MCNC: 13.6 G/DL — SIGNIFICANT CHANGE UP (ref 11.5–15.5)
IMM GRANULOCYTES NFR BLD AUTO: 0.2 % — SIGNIFICANT CHANGE UP (ref 0–1.5)
LIDOCAIN IGE QN: 25 U/L — SIGNIFICANT CHANGE UP (ref 7–60)
LYMPHOCYTES # BLD AUTO: 1.46 K/UL — SIGNIFICANT CHANGE UP (ref 1–3.3)
LYMPHOCYTES # BLD AUTO: 25.6 % — SIGNIFICANT CHANGE UP (ref 13–44)
MCHC RBC-ENTMCNC: 30.5 PG — SIGNIFICANT CHANGE UP (ref 27–34)
MCHC RBC-ENTMCNC: 31.9 GM/DL — LOW (ref 32–36)
MCV RBC AUTO: 95.7 FL — SIGNIFICANT CHANGE UP (ref 80–100)
MONOCYTES # BLD AUTO: 0.41 K/UL — SIGNIFICANT CHANGE UP (ref 0–0.9)
MONOCYTES NFR BLD AUTO: 7.2 % — SIGNIFICANT CHANGE UP (ref 2–14)
NEUTROPHILS # BLD AUTO: 3.71 K/UL — SIGNIFICANT CHANGE UP (ref 1.8–7.4)
NEUTROPHILS NFR BLD AUTO: 64.9 % — SIGNIFICANT CHANGE UP (ref 43–77)
NRBC # BLD: 0 /100 WBCS — SIGNIFICANT CHANGE UP (ref 0–0)
PLATELET # BLD AUTO: 159 K/UL — SIGNIFICANT CHANGE UP (ref 150–400)
POTASSIUM SERPL-MCNC: 3.8 MMOL/L — SIGNIFICANT CHANGE UP (ref 3.5–5.3)
POTASSIUM SERPL-SCNC: 3.8 MMOL/L — SIGNIFICANT CHANGE UP (ref 3.5–5.3)
PROT SERPL-MCNC: 7.5 G/DL — SIGNIFICANT CHANGE UP (ref 6–8.3)
RBC # BLD: 4.46 M/UL — SIGNIFICANT CHANGE UP (ref 3.8–5.2)
RBC # FLD: 13.3 % — SIGNIFICANT CHANGE UP (ref 10.3–14.5)
SARS-COV-2 RNA SPEC QL NAA+PROBE: SIGNIFICANT CHANGE UP
SODIUM SERPL-SCNC: 141 MMOL/L — SIGNIFICANT CHANGE UP (ref 135–145)
TROPONIN T, HIGH SENSITIVITY RESULT: 10 NG/L — SIGNIFICANT CHANGE UP (ref 0–51)
TROPONIN T, HIGH SENSITIVITY RESULT: 8 NG/L — SIGNIFICANT CHANGE UP (ref 0–51)
WBC # BLD: 5.71 K/UL — SIGNIFICANT CHANGE UP (ref 3.8–10.5)
WBC # FLD AUTO: 5.71 K/UL — SIGNIFICANT CHANGE UP (ref 3.8–10.5)

## 2020-10-02 PROCEDURE — 93010 ELECTROCARDIOGRAM REPORT: CPT

## 2020-10-02 PROCEDURE — 99285 EMERGENCY DEPT VISIT HI MDM: CPT

## 2020-10-02 PROCEDURE — 93454 CORONARY ARTERY ANGIO S&I: CPT | Mod: 26,59

## 2020-10-02 PROCEDURE — 93000 ELECTROCARDIOGRAM COMPLETE: CPT

## 2020-10-02 PROCEDURE — 71046 X-RAY EXAM CHEST 2 VIEWS: CPT | Mod: 26

## 2020-10-02 PROCEDURE — 92928 PRQ TCAT PLMT NTRAC ST 1 LES: CPT | Mod: LC

## 2020-10-02 PROCEDURE — 99215 OFFICE O/P EST HI 40 MIN: CPT

## 2020-10-02 PROCEDURE — 99152 MOD SED SAME PHYS/QHP 5/>YRS: CPT

## 2020-10-02 RX ORDER — CLOPIDOGREL BISULFATE 75 MG/1
1 TABLET, FILM COATED ORAL
Qty: 0 | Refills: 0 | DISCHARGE

## 2020-10-02 RX ORDER — METOPROLOL TARTRATE 50 MG
25 TABLET ORAL DAILY
Refills: 0 | Status: DISCONTINUED | OUTPATIENT
Start: 2020-10-02 | End: 2020-10-03

## 2020-10-02 RX ORDER — ASPIRIN/CALCIUM CARB/MAGNESIUM 324 MG
81 TABLET ORAL DAILY
Refills: 0 | Status: DISCONTINUED | OUTPATIENT
Start: 2020-10-02 | End: 2020-10-03

## 2020-10-02 RX ORDER — ISOSORBIDE MONONITRATE 60 MG/1
60 TABLET, EXTENDED RELEASE ORAL DAILY
Refills: 0 | Status: DISCONTINUED | OUTPATIENT
Start: 2020-10-02 | End: 2020-10-03

## 2020-10-02 RX ORDER — RANOLAZINE 500 MG/1
1 TABLET, FILM COATED, EXTENDED RELEASE ORAL
Qty: 0 | Refills: 0 | DISCHARGE

## 2020-10-02 RX ORDER — RANOLAZINE 500 MG/1
500 TABLET, FILM COATED, EXTENDED RELEASE ORAL
Refills: 0 | Status: DISCONTINUED | OUTPATIENT
Start: 2020-10-02 | End: 2020-10-02

## 2020-10-02 RX ORDER — CLOPIDOGREL BISULFATE 75 MG/1
75 TABLET, FILM COATED ORAL DAILY
Refills: 0 | Status: DISCONTINUED | OUTPATIENT
Start: 2020-10-03 | End: 2020-10-03

## 2020-10-02 RX ORDER — ISOSORBIDE MONONITRATE 60 MG/1
1 TABLET, EXTENDED RELEASE ORAL
Qty: 0 | Refills: 0 | DISCHARGE
Start: 2020-10-02

## 2020-10-02 RX ORDER — CLOPIDOGREL BISULFATE 75 MG/1
1 TABLET, FILM COATED ORAL
Qty: 90 | Refills: 4
Start: 2020-10-02

## 2020-10-02 RX ORDER — ISOSORBIDE MONONITRATE 60 MG/1
1 TABLET, EXTENDED RELEASE ORAL
Qty: 0 | Refills: 0 | DISCHARGE

## 2020-10-02 RX ORDER — ISOSORBIDE MONONITRATE 60 MG/1
60 TABLET, EXTENDED RELEASE ORAL DAILY
Refills: 0 | Status: DISCONTINUED | OUTPATIENT
Start: 2020-10-02 | End: 2020-10-02

## 2020-10-02 RX ORDER — HYDRALAZINE HCL 50 MG
5 TABLET ORAL ONCE
Refills: 0 | Status: COMPLETED | OUTPATIENT
Start: 2020-10-02 | End: 2020-10-02

## 2020-10-02 RX ORDER — ASPIRIN/CALCIUM CARB/MAGNESIUM 324 MG
324 TABLET ORAL ONCE
Refills: 0 | Status: COMPLETED | OUTPATIENT
Start: 2020-10-02 | End: 2020-10-02

## 2020-10-02 RX ADMIN — Medication 324 MILLIGRAM(S): at 14:10

## 2020-10-02 RX ADMIN — Medication 5 MILLIGRAM(S): at 21:35

## 2020-10-02 NOTE — ED PROVIDER NOTE - CLINICAL SUMMARY MEDICAL DECISION MAKING FREE TEXT BOX
ATTG: : back and epigastric pain without vomiting / diarrhea / nausea, given hx patient concerned about cardiac and went to see cardiologist who sent to ED, here with no acute symptoms. concerns include cardiac / gi / vasc, will check labs, check xray chest, cardiac work up, and re eval for dispo

## 2020-10-02 NOTE — HISTORY OF PRESENT ILLNESS
[FreeTextEntry1] : Adry presented to the office today for a followup evaluation. She was last seen in the office in March.\par \par She is now 86 years old, with a history of bladder cancer. She has a history of  hypertension, with meds discontinued when her pressure dropped. In May of 2016, she had an episode of syncope.  While hospitalized, she was observed to have nonsustained ventricular tachycardia. She was transferred for further evaluation. Nuclear stress testing and echocardiography were unremarkable. A loop recorder was implanted, and more recently explanted. She has a 4.0 cm AAA, which has been followed. She has had a myocardial infarction, for which she had PCI of the circumflex and the right coronary artery. \par \par She presented in September with chest discomfort.   She described the symptoms as being similar but not identical to the symptoms which she was experiencing with her myocardial infarction.  Nuclear stress testing revealed fixed inferior and inferolateral defects, suggestive of infarct, with an ejection fraction of 45%. We decided to try to medically manage. Unfortunately, at the end of November, 2019 she presented to the hospital with unstable angina. Cardiac catheterization revealed a severe stenosis distal within a small caliber obtuse marginal, which does serve a relatively significant myocardial territory. It was felt most appropriate to try medical therapy, with PCI reserved only for failure of medical therapy.\par \par  At the time of the last visit 6 months ago, she was feeling well on antianginals.\par \par Unfortunately, she presents to the office today urgently because of 2 episodes of seemingly anginal discomfort in her chest and back. She had an episode yesterday while visiting her  in the hospital, an another episode last night lasting about an hour, relieved by nitroglycerin. She did complaint with her medications. She does not report dyspnea. She feels that she is "fine".\par

## 2020-10-02 NOTE — PHYSICAL EXAM
[Normal Appearance] : normal appearance [General Appearance - In No Acute Distress] : no acute distress [Normal Conjunctiva] : the conjunctiva exhibited no abnormalities [Normal Oral Mucosa] : normal oral mucosa [Normal Jugular Venous V Waves Present] : normal jugular venous V waves present [FreeTextEntry1] : No JVD [Respiration, Rhythm And Depth] : normal respiratory rhythm and effort [Exaggerated Use Of Accessory Muscles For Inspiration] : no accessory muscle use [Auscultation Breath Sounds / Voice Sounds] : lungs were clear to auscultation bilaterally [Abdomen Soft] : soft [Abdomen Tenderness] : non-tender [Abdomen Mass (___ Cm)] : no abdominal mass palpated [Abnormal Walk] : normal gait [Nail Clubbing] : no clubbing of the fingernails [Cyanosis, Localized] : no localized cyanosis [Petechial Hemorrhages (___cm)] : no petechial hemorrhages [Skin Color & Pigmentation] : normal skin color and pigmentation [] : no rash [No Venous Stasis] : no venous stasis [Skin Lesions] : no skin lesions [Oriented To Time, Place, And Person] : oriented to person, place, and time [Affect] : the affect was normal [Mood] : the mood was normal [No Anxiety] : not feeling anxious [Not Palpable] : not palpable [No Precordial Heave] : no precordial heave was noted [Normal Rate] : normal [Rhythm Regular] : regular [Normal S1] : normal S1 [Normal S2] : normal S2 [No Gallop] : no gallop heard [No Murmur] : no murmurs heard [Right Carotid Bruit] : right carotid bruit heard [Left Carotid Bruit] : left carotid bruit heard [1+] : left 1+ [Bruit] : no bruit heard [___ +] : bilateral [unfilled]U+ pretibial pitting edema

## 2020-10-02 NOTE — DISCHARGE NOTE PROVIDER - CARE PROVIDER_API CALL
Michael Vee  CARDIOVASCULAR DISEASE  [address] [place] [address]  Phone: [phone]  Fax: [phone]  Follow Up Time:    Miguel Riley)  Cardiovascular Disease  [address] [place] [address]  Phone: [phone]  Fax: [phone]  Follow Up Time:

## 2020-10-02 NOTE — DISCHARGE NOTE PROVIDER - NSDCMRMEDTOKEN_GEN_ALL_CORE_FT
aspirin 81 mg oral delayed release tablet: 1 tab(s) orally once a day  h/h  B-12 2500 mcg oral tablet: 1 tab(s) orally once a day  Centrum oral tablet: 1 tab(s) orally once a day  clopidogrel 75 mg oral tablet: 1 tab(s) orally once a day  Imdur 60 mg oral tablet, extended release: 1 tab(s) orally once a day (in the morning)  isosorbide mononitrate 60 mg oral tablet, extended release: 1 tab(s) orally once a day  magnesium oxide 400 mg (240 mg elemental magnesium) oral tablet: 1 tab(s) orally once a day  metoprolol succinate 25 mg oral tablet, extended release: 1 tab(s) orally once a day  nitroglycerin 0.4 mg sublingual tablet: 1 tab(s) sublingual , As Needed    NOTE: [facility] filled [facility] Dec. 2019.  Repatha 140 mg/mL subcutaneous solution: subcutaneous every 2 weeks    NOTE: unable to verify with secondary source  selenium 200 mcg oral tablet: 1 tab(s) orally once a day  Vitamin C 1000 mg oral tablet: 1 tab(s) orally once a day  Vitamin D3: 1 tab(s) orally once a day

## 2020-10-02 NOTE — ED PROVIDER NOTE - PROGRESS NOTE DETAILS
Lula, PGY3- first trop reassuring. No pain in ED. Called Dr. Foster who is partners with Dr. NATALIIA Riley (pt's cardiologist). Recommends admission for coronary catheterization to Dr. Vee. Cards fellow evaluting in ED, KAREN Macias. Lula, PGY3- admit to [facility]. Cath lab this evening. Pt remains stable.

## 2020-10-02 NOTE — DISCHARGE NOTE PROVIDER - HOSPITAL COURSE
This is a 87 yo female with history of  HTN, HLD, CAD, prior stents, MI 2019  last catheterization Nov 19 with Dr. Michael Vee, AAA (stable, surveilled) presenting with epigastric pain yesterday. Per family pt has been stressed this past week due to  being in [facility] hospital. Yesterday felt back pain. At home started to have epigastric pain. Pain lasted 1 hour. Moderate. Took SL NTG which made pain go away. Troponin negative.  10/02/20 s/p [facility] VEL x 3 OM1 via RFA access This is a 85 yo female with history of  HTN, HLD, CAD, prior stents, MI 2019  last catheterization Nov 19 with Dr. Michael Vee, AAA (stable, surveilled) presenting with epigastric pain yesterday. Per family pt has been stressed this past week due to  being in \[facility]\"" hospital. Yesterday felt back pain. At home started to have epigastric pain. Pain lasted 1 hour. Moderate. Took SL NTG which made pain go away. Troponin negative.  10/02/20 s/p [facility] VEL x 3 OM1 via RFA access   10/3: Patient tolerated procedure well. Ambulated today- tolerated. groin site stable no evidence of hematoma. Stable for discharge today

## 2020-10-02 NOTE — ED PROVIDER NOTE - OBJECTIVE STATEMENT
86 yoF, PMHx of HTN, HLD, CAD, prior stents, last catheterization Nov 19 with Dr. Michael Vee, AAA (stable, surveilled) presenting with epigastric pain yesterday. Per family pt has been stressed this past week due to  being in [facility] hospital. Yesterday felt back pain. At home started to have epigastric pain. Pain lasted 1 hour. Moderate. Took SL NTG which made pain go away. Currently pain free. Denies any fever, chills, SOB, or abd pain. Been usual state of health last week. Did not take her asa this am. Does not smoke, drink, use drugs.

## 2020-10-02 NOTE — DISCUSSION/SUMMARY
[FreeTextEntry1] : Adry has been found to have CAD, not ideally suited for PCI.  \par \par  That was nearly a year ago. She now presents with 2 episodes of seemingly anginal discomfort at rest.\par \par Her EKG does not suggest anything significant, although given her territory at risk, and EKG may not be very revealing. Given her symptoms, I have suggested that she go to the emergency department so that she can be evaluated urgently for cardiac catheterization this afternoon.\par \par She will require additional evaluation of her abdominal aortic aneurysm.

## 2020-10-02 NOTE — ED PROVIDER NOTE - PHYSICAL EXAMINATION
General: alert, conversant, looks well, vitals reassuring  Head: atraumatic, normocephalic  Eyes: PERRL, EOMI, no scleral icterus  ENT: normal phonation, airway patent  Neck: full ROM, no midline ttp  CV: RRR, no murmurs, HDS  Pulm: lungs CTA b/l, no wheezing, no respiratory distress  GI: abd soft, non tender, no guarding/rebound/masses, no pulsatile mass felt  Back: normal ROM, no midline ttp, no signs of trauma  Extremities: normal ROM, joints stable, distal pulses intact, no edema  Neuro: alert, oriented x3, moving all extremities, interactive, normal speech/memory, 5/5 strength in extremities   Derm: warm, dry, normal color, no rash/wounds

## 2020-10-02 NOTE — DISCHARGE NOTE PROVIDER - CARE PROVIDERS DIRECT ADDRESSES
[email or web address]

## 2020-10-02 NOTE — CHART NOTE - NSCHARTNOTEFT_GEN_A_CORE
s/p PCI OM  #6 Sudanese sheath removed from right femoral artery manual pressure held X20 minutes patient tolerated well good hemostasis  HR 50s -190 prior to sheath removal  Hydralazine 5 given with good results SBP 150s  ASA and Plavix discussed with patient and daughter  Possible discharge in the AM  folllow up with Dr. Riley 1-2 weeks

## 2020-10-02 NOTE — ED ADULT NURSE NOTE - OBJECTIVE STATEMENT
86 y female presents from home with chest pain yesterday x 1.5 hours. Patient reports to chest pain beginning on right side radiating to the back- reports to burning sensation following meals. Patient reports to relief in symptoms following 1 dose of nitro. Patient denies lightheadedness, dizziness, N/V/D. A&Ox3. Skin warm dry and intact. Breathing comfortably in bed- no distress. Abdomen soft nontender nondistended. Safety maintained- bed locked in lowest position.

## 2020-10-02 NOTE — ED PROVIDER NOTE - CARE PLAN
Principal Discharge DX:	Chest pain, moderate coronary artery risk  Secondary Diagnosis:	CAD (coronary artery disease)

## 2020-10-02 NOTE — DISCHARGE NOTE PROVIDER - NSDCCPCAREPLAN_GEN_ALL_CORE_FT
PRINCIPAL DISCHARGE DIAGNOSIS  Diagnosis: CAD (coronary artery disease)  Assessment and Plan of Treatment: Pt remains chest pain free and understands post cath discharge instructions   No heavy lifting, strenuous activity, bending, straining or unnecessary stair climbing  for 2 weeks. No sex for 1 week.  No driving for 2 days. You may shower 24 hours following procedure but avoid baths and swimming for 1 week. Check groin site for bleeding and/or swelling daily following procedure. Call your doctor/cardiologist immediately should it occur or if you have increased/persistent pain at the site. Follow up with your cardiologist in 1- 2 weeks. You may call [facility] at [phone] or [phone] after office hours and weekends  with any questions or concerns following your procedure. Take medications as prescribed.      SECONDARY DISCHARGE DIAGNOSES  Diagnosis: HLD (hyperlipidemia)  Assessment and Plan of Treatment: Your LDL cholesterol will be less than 70mg/dL   Continue with your cholesterol medications. Eat a heart healthy diet that is low in saturated fats and salt, and includes whole grains, fruits, vegetables and lean protein; exercise regularly (consult with your physician or cardiologist first); maintain a heart healthy weight. Continue to follow with your primary physician or cardiologist for treatment goals, continue medication, have liver function testing every 3 months as anti lipid medications can cause liver irritation. If you smoke - quit (A resource to help you stop smoking is the [facility] [employer] for Tobacco Control – phone number [phone].).    Diagnosis: HTN (hypertension)  Assessment and Plan of Treatment: Your blood pressure will be controlled.   Continue with your blood pressure medications; eat a heart healthy diet with low salt diet; exercise regularly (consult with your physician or cardiologist first); maintain a heart healthy weight; if you smoke - quit (A resource to help you stop smoking is the [facility] [employer] for [employer] – phone number [phone].); include healthy ways to manage stress. Continue to follow with your primary care physician or cardiologist.    Diagnosis: CAD (coronary artery disease)  Assessment and Plan of Treatment:

## 2020-10-02 NOTE — DISCHARGE NOTE PROVIDER - NSDCFUADDINST_GEN_ALL_CORE_FT
No heavy lifting, strenuous activity, bending, straining, or unnecessary stair climbing for 2 weeks. No driving for 2 days. You may shower 24 hours following the procedure but avoid baths/swimming for 1 week. Check your groin site for bleeding and/or swelling daily following procedure and call your doctor immediately if it occurs or if you experience increased pain at the site. Follow up with your cardiologist in 1-2 weeks. You may call [facility] if you have any questions/concerns regarding your procedure [phone].

## 2020-10-02 NOTE — H&P CARDIOLOGY - HISTORY OF PRESENT ILLNESS
This is a 87 yo female with history of  HTN, HLD, CAD, prior stents, last catheterization Nov 19 with Dr. Michael Vee, AAA (stable, surveilled) presenting with epigastric pain yesterday. Per family pt has been stressed this past week due to  being in [facility] hospital. Yesterday felt back pain. At home started to have epigastric pain. Pain lasted 1 hour. Moderate. Took SL NTG which made pain go away. This is a 87 yo female with history of  HTN, HLD, CAD, prior stents, last catheterization Nov 19 with Dr. Michael Vee, AAA (stable, surveilled) presenting with epigastric pain yesterday. Per family pt has been stressed this past week due to  being in [facility] hospital. Yesterday felt back pain. At home started to have epigastric pain. Pain lasted 1 hour. Moderate. Took SL NTG which made pain go away. Troponin negative.     This is a 85 yo female with history of  HTN, HLD, CAD, prior stents, last catheterization Nov 19 with Dr. Michael Vee, AAA (stable, surveilled) presenting with epigastric pain yesterday. Per family pt has been stressed this past week due to  being in [facility]. Yesterday felt back pain. At home started to have epigastric pain. Pain lasted 1 hour. Moderate. Took SL NTG which made pain go away. Troponin negative.    cardiac cath   CORONARY VESSELS: The coronary circulation is right dominant.  LM:   --  LM: Angiography showed minor luminal irregularities with no flow  limiting lesions.  LAD:   --  LAD: Angiography showed minor luminal irregularities with no  flow limiting lesions.  CX:   --  OM1: There was a 90 % stenosis.  RCA:   --  RCA: Angiography showed minor luminal irregularities with no  flow limiting lesions.  --  Mid RCA: There was a 20 % stenosis.  COMPLICATIONS: There were no complications.  DIAGNOSTIC RECOMMENDATIONS: The patient had a negative stress test and  presented with chest pain to the ED. The OM is distal and would recommend  medical rx first. If no improvement then would consider PCI of OM1 The  patient should continue with the present medications.  INTERVENTIONAL RECOMMENDATIONS: The patient had a negative stress test and  presented with chest pain to the ED. The OM is distal and would recommend  medical rx first. If no improvement then would consider PCI of OM1  Prepared and signed by  Michael Vee M.D.  Signed 11/26/2019 16:42:02   This is a 85 yo female with history of  HTN, HLD, CAD, prior stents, MI 2019  last catheterization Nov 19 with Dr. Michael Vee, AAA (stable, surveilled) presenting with epigastric pain yesterday. Per family pt has been stressed this past week due to  being in [facility]. Yesterday felt back pain. At home started to have epigastric pain. Pain lasted 1 hour. Moderate. Took SL NTG which made pain go away. Troponin negative.  ILR was implanted and explanted.  cardiac cath   CORONARY VESSELS: The coronary circulation is right dominant.  LM:   --  LM: Angiography showed minor luminal irregularities with no flow  limiting lesions.  LAD:   --  LAD: Angiography showed minor luminal irregularities with no  flow limiting lesions.  CX:   --  OM1: There was a 90 % stenosis.  RCA:   --  RCA: Angiography showed minor luminal irregularities with no  flow limiting lesions.  --  Mid RCA: There was a 20 % stenosis.  COMPLICATIONS: There were no complications.  DIAGNOSTIC RECOMMENDATIONS: The patient had a negative stress test and  presented with chest pain to the ED. The OM is distal and would recommend  medical rx first. If no improvement then would consider PCI of OM1 The  patient should continue with the present medications.  INTERVENTIONAL RECOMMENDATIONS: The patient had a negative stress test and  presented with chest pain to the ED. The OM is distal and would recommend  medical rx first. If no improvement then would consider PCI of OM1  Prepared and signed by  Michael Vee M.D.  Signed 11/26/2019 16:42:02

## 2020-10-02 NOTE — H&P CARDIOLOGY - BP NONINVASIVE DIASTOLIC (MM HG)
Received refill request from [address] [place] [address] for Synthroid 75mcg and Neurontin 100 mg.
76

## 2020-10-02 NOTE — ED PROVIDER NOTE - ATTENDING CONTRIBUTION TO CARE
87 y/o f with pmhx AAA, back pain, Vertigo, Bladder Ca, HTN, CAD, presents for episode of back pain and abd pain last night. today resolved and went to see her cardiologist after taking nitro to relieve the pain last night and was sent in for evaluation. had a cardiac cath in Nov 2019. no cough no fever no chills. no abd pain today. no vomiting / nausea no diarrhea.   Gen.  no acute distress  HEENT:  perrl eomi  Lungs:  b/l bs  CVS: S1S2   Abd;  soft non tender no distention  Ext: no pitting edema or erythema   Neuro: aaox 3 no focal deficits  MSK: strength 5/5 b/l upper and lower ext.

## 2020-10-02 NOTE — ED ADULT NURSE NOTE - PSH
History of cardiac monitoring  loop monitor  removed 7/22/19  S/P breast biopsy, left    S/P cystoscopy    S/P D&C    Status post cataract extraction  OD

## 2020-10-02 NOTE — DISCHARGE NOTE PROVIDER - NSDCCPTREATMENT_GEN_ALL_CORE_FT
PRINCIPAL PROCEDURE  Procedure: Left heart cardiac catheterization  Findings and Treatment: DESx 3 OM1

## 2020-10-02 NOTE — ED PROVIDER NOTE - PMH
AAA (abdominal aortic aneurysm)    Back pain, chronic  occasional lower back pain  Benign paroxysmal vertigo of right ear  history of right ear abscess  Bladder cancer  dx 2012 treated with BCG  Coronary artery disease of native artery of native heart with stable angina pectoris    History of loop recorder  placed by Dr. DHARMESH Ibarra. Removed 2019  HTN (hypertension)    Neurological complaint  4/29/19 code stroke post cardiac cath c/o LUE weakness, NIHSS score 0, symptoms reolved next day  Syncope, unspecified syncope type

## 2020-10-03 ENCOUNTER — TRANSCRIPTION ENCOUNTER (OUTPATIENT)
Age: 85
End: 2020-10-03

## 2020-10-03 VITALS — SYSTOLIC BLOOD PRESSURE: 152 MMHG | DIASTOLIC BLOOD PRESSURE: 48 MMHG | RESPIRATION RATE: 18 BRPM | HEART RATE: 74 BPM

## 2020-10-03 DIAGNOSIS — E78.5 HYPERLIPIDEMIA, UNSPECIFIED: ICD-10-CM

## 2020-10-03 DIAGNOSIS — I25.10 ATHEROSCLEROTIC HEART DISEASE OF NATIVE CORONARY ARTERY WITHOUT ANGINA PECTORIS: ICD-10-CM

## 2020-10-03 DIAGNOSIS — I10 ESSENTIAL (PRIMARY) HYPERTENSION: ICD-10-CM

## 2020-10-03 PROCEDURE — 99222 1ST HOSP IP/OBS MODERATE 55: CPT

## 2020-10-03 RX ADMIN — Medication 25 MILLIGRAM(S): at 08:52

## 2020-10-03 NOTE — PROGRESS NOTE ADULT - PROBLEM SELECTOR PLAN 1
Pt remains chest pain free and understands post cath discharge instructions   No heavy lifting, strenuous activity, bending, straining or unnecessary stair climbing  for 2 weeks. No sex for 1 week.  No driving for 2 days. You may shower 24 hours following procedure but avoid baths and swimming for 1 week. Check groin site for bleeding and/or swelling daily following procedure. Call your doctor/cardiologist immediately should it occur or if you have increased/persistent pain at the site. Follow up with your cardiologist in 1- 2 weeks. You may call [facility] at [phone] or [phone] after office hours and weekends  with any questions or concerns following your procedure. Take medications as prescribed.

## 2020-10-03 NOTE — CONSULT NOTE ADULT - SUBJECTIVE AND OBJECTIVE BOX
[facility] - Melania Gallo, Jose, Osvaldo, Gerardo, Giovaan Burroughs  Office Number: [phone]    Initial Consult Note    CHIEF COMPLAINT: Patient is a 86y old  Female who presents with a chief complaint of CAD (03 Oct 2020 00:25)      HPI:    This is a 87 yo female with history of  HTN, HLD, CAD, prior stents, MI 2019  last catheterization Nov 19 with Dr. Michael Vee, AAA (stable, surveilled) presenting with epigastric pain yesterday. Per family pt has been stressed this past week due to  being in [facility]. Yesterday felt back pain. At home started to have epigastric pain. Pain lasted 1 hour. Moderate. Took SL NTG which made pain go away. Troponin negative.  ILR was implanted and explanted.  cardiac cath   CORONARY VESSELS: The coronary circulation is right dominant.  LM:   --  LM: Angiography showed minor luminal irregularities with no flow  limiting lesions.  LAD:   --  LAD: Angiography showed minor luminal irregularities with no  flow limiting lesions.  CX:   --  OM1: There was a 90 % stenosis.  RCA:   --  RCA: Angiography showed minor luminal irregularities with no  flow limiting lesions.  --  Mid RCA: There was a 20 % stenosis.  COMPLICATIONS: There were no complications.  DIAGNOSTIC RECOMMENDATIONS: The patient had a negative stress test and  presented with chest pain to the ED. The OM is distal and would recommend  medical rx first. If no improvement then would consider PCI of OM1 The  patient should continue with the present medications.  INTERVENTIONAL RECOMMENDATIONS: The patient had a negative stress test and  presented with chest pain to the ED. The OM is distal and would recommend  medical rx first. If no improvement then would consider PCI of OM1  Prepared and signed by  Michael Vee M.D.  Signed 11/26/2019 16:42:02   (02 Oct 2020 19:36)      PAST MEDICAL & SURGICAL HISTORY:  Neurological complaint  4/29/19 code stroke post cardiac cath c/o LUE weakness, NIHSS score 0, symptoms reolved next day    History of loop recorder  placed by Dr. DHARMESH Ibarra. Removed 2019    Syncope, unspecified syncope type    AAA (abdominal aortic aneurysm)    Coronary artery disease of native artery of native heart with stable angina pectoris    HTN (hypertension)    Back pain, chronic  occasional lower back pain    Benign paroxysmal vertigo of right ear  history of right ear abscess    Bladder cancer  dx 2012 treated with BCG    Status post cataract extraction  OD    History of cardiac monitoring  loop monitor  removed 7/22/19    S/P cystoscopy    S/P D&amp;C    S/P breast biopsy, left        SOCIAL HISTORY:  No tobacco, ethanol, or drug abuse.    FAMILY HISTORY:  No pertinent family history in first degree relatives    Family history of MI (myocardial infarction) (Sibling)  at 59 years of age    Family history of hypertension    Family history of diabetes mellitus      No family history of acute MI or sudden cardiac death.    MEDICATIONS  (STANDING):  aspirin enteric coated 81 milliGRAM(s) Oral daily  clopidogrel Tablet 75 milliGRAM(s) Oral daily  isosorbide   mononitrate ER Tablet (IMDUR) 60 milliGRAM(s) Oral daily  metoprolol succinate ER 25 milliGRAM(s) Oral daily    MEDICATIONS  (PRN):      Allergies    adhesives (Other)  latex (Rash)  No Known Drug Allergies    Intolerances    statins (Muscle Pain)      REVIEW OF SYSTEMS:    CONSTITUTIONAL: No weakness, fevers or chills  EYES/ENT: No visual changes;  No vertigo or throat pain   NECK: No pain or stiffness  RESPIRATORY: No cough, wheezing, hemoptysis; No shortness of breath  CARDIOVASCULAR: No chest pain or palpitations  GASTROINTESTINAL: No abdominal pain. No nausea, vomiting, or hematemesis; No diarrhea or constipation. No melena or hematochezia.  GENITOURINARY: No dysuria, frequency or hematuria  NEUROLOGICAL: No numbness or weakness  SKIN: No itching or rash  All other review of systems is negative unless indicated above    VITAL SIGNS:   Vital Signs Last 24 Hrs  T(C): 37.3 (03 Oct 2020 04:31), Max: 37.3 (03 Oct 2020 04:31)  T(F): 99.1 (03 Oct 2020 04:31), Max: 99.1 (03 Oct 2020 04:31)  HR: 74 (03 Oct 2020 08:40) (46 - 77)  BP: 152/48 (03 Oct 2020 08:40) (119/64 - 180/77)  BP(mean): 107 (02 Oct 2020 19:36) (107 - 107)  RR: 18 (03 Oct 2020 08:40) (15 - 20)  SpO2: 98% (03 Oct 2020 04:31) (95% - 100%)    I&O's Summary      On Exam:    Constitutional: NAD, alert and oriented x 3  Lungs:  Non-labored, breath sounds are clear bilaterally, No wheezing, rales or rhonchi  Cardiovascular: RRR.  S1 and S2 positive.  No murmurs, rubs, gallops or clicks  Gastrointestinal: Bowel Sounds present, soft, nontender.   Lymph: No peripheral edema. No cervical lymphadenopathy.  Neurological: Alert, no focal deficits  Skin: No rashes or ulcers   Psych:  Mood & affect appropriate.    LABS: All Labs Reviewed:                        13.6   5.71  )-----------( 159      ( 02 Oct 2020 14:36 )             42.7     02 Oct 2020 14:36    141    |  104    |  22     ----------------------------<  88     3.8     |  25     |  0.83     Ca    9.7        02 Oct 2020 14:36    TPro  7.5    /  Alb  4.6    /  TBili  0.4    /  DBili  x      /  AST  16     /  ALT  10     /  AlkPhos  65     02 Oct 2020 14:36          Blood Culture:         RADIOLOGY:    EKG: sb  tele: sb

## 2020-10-03 NOTE — DISCHARGE NOTE NURSING/CASE MANAGEMENT/SOCIAL WORK - PATIENT PORTAL LINK FT
You can access the FollowMyHealth Patient Portal offered by [facility] by registering at the following website: [email or web address]. By joining [employer]’s FollowMyHealth portal, you will also be able to view your health information using other applications (apps) compatible with our system.

## 2020-10-03 NOTE — PROGRESS NOTE ADULT - ASSESSMENT
This is a 85 yo female with history of  HTN, HLD, CAD, prior stents, MI 2019  last catheterization Nov 19 with Dr. Michael Vee, AAA (stable, surveilled) presenting with epigastric pain yesterday, now s/p [facility] VEL x 1 OM1 via RFA access.

## 2020-10-03 NOTE — PROGRESS NOTE ADULT - SUBJECTIVE AND OBJECTIVE BOX
This is a 85 yo female with history of  HTN, HLD, CAD, prior stents, MI 2019  last catheterization Nov 19 with Dr. Michael Vee, AAA (stable, surveilled) presenting with epigastric pain yesterday, now s/p [facility] VEL x 1 OM1 via RFA access. This is a 87 yo female with history of  HTN, HLD, CAD, prior stents, MI 2019  last catheterization Nov 19 with Dr. Michael Vee, AAA (stable, surveilled) presenting with epigastric pain yesterday, now s/p [facility] VEL x 1 OM1 via RFA access, site benign

## 2020-10-03 NOTE — CONSULT NOTE ADULT - ASSESSMENT
84 y/o female with CAD, NSTEMI s/p PCI with 2 stents (RCA and LCx) in April 2019, bladder cancer, AAA, presenting with angina, abdominal pain and back pain. She is now s/p pci to her om1.    - tolerated procedure well and feeling better  - cont dapt (asa and plavix)  - cont repatha  - cont bb and nitrate  - remain in sr on telemetry without ectopy  - bp improved post-procedure  - no sign of volume overload  - watch creatinine and electrolytes. Keep K>4, Mg>2  - as long as can walk safely, can d/c home. She will follow up with Dr. Riley within 2 weeks.

## 2020-10-03 NOTE — PROGRESS NOTE ADULT - PROBLEM SELECTOR PLAN 2
Your blood pressure will be controlled.   Continue with your blood pressure medications; eat a heart healthy diet with low salt diet; exercise regularly (consult with your physician or cardiologist first); maintain a heart healthy weight; if you smoke - quit (A resource to help you stop smoking is the [facility] – phone number [phone].); include healthy ways to manage stress. Continue to follow with your primary care physician or cardiologist.

## 2020-10-03 NOTE — PROGRESS NOTE ADULT - PROBLEM SELECTOR PLAN 3
Your LDL cholesterol will be less than 70mg/dL   Continue with your cholesterol medications. Eat a heart healthy diet that is low in saturated fats and salt, and includes whole grains, fruits, vegetables and lean protein; exercise regularly (consult with your physician or cardiologist first); maintain a heart healthy weight. Continue to follow with your primary physician or cardiologist for treatment goals, continue medication, have liver function testing every 3 months as anti lipid medications can cause liver irritation. If you smoke - quit (A resource to help you stop smoking is the [facility] – phone number [phone].).

## 2020-10-13 PROCEDURE — 99153 MOD SED SAME PHYS/QHP EA: CPT

## 2020-10-13 PROCEDURE — 93005 ELECTROCARDIOGRAM TRACING: CPT

## 2020-10-13 PROCEDURE — 99285 EMERGENCY DEPT VISIT HI MDM: CPT

## 2020-10-13 PROCEDURE — C1894: CPT

## 2020-10-13 PROCEDURE — C9600: CPT | Mod: LC

## 2020-10-13 PROCEDURE — 84484 ASSAY OF TROPONIN QUANT: CPT

## 2020-10-13 PROCEDURE — C1887: CPT

## 2020-10-13 PROCEDURE — U0003: CPT

## 2020-10-13 PROCEDURE — 99152 MOD SED SAME PHYS/QHP 5/>YRS: CPT

## 2020-10-13 PROCEDURE — C1769: CPT

## 2020-10-13 PROCEDURE — 80053 COMPREHEN METABOLIC PANEL: CPT

## 2020-10-13 PROCEDURE — 85025 COMPLETE CBC W/AUTO DIFF WBC: CPT

## 2020-10-13 PROCEDURE — C1874: CPT

## 2020-10-13 PROCEDURE — 93454 CORONARY ARTERY ANGIO S&I: CPT | Mod: 59

## 2020-10-13 PROCEDURE — 83690 ASSAY OF LIPASE: CPT

## 2020-10-13 PROCEDURE — 71046 X-RAY EXAM CHEST 2 VIEWS: CPT

## 2020-10-13 PROCEDURE — C1725: CPT

## 2020-10-14 ENCOUNTER — RX RENEWAL (OUTPATIENT)
Age: 85
End: 2020-10-14

## 2020-10-21 ENCOUNTER — NON-APPOINTMENT (OUTPATIENT)
Age: 85
End: 2020-10-21

## 2020-10-21 ENCOUNTER — APPOINTMENT (OUTPATIENT)
Dept: CARDIOLOGY | Facility: CLINIC | Age: 85
End: 2020-10-21
Payer: MEDICARE

## 2020-10-21 VITALS
HEIGHT: 63 IN | SYSTOLIC BLOOD PRESSURE: 177 MMHG | DIASTOLIC BLOOD PRESSURE: 81 MMHG | BODY MASS INDEX: 28 KG/M2 | WEIGHT: 158 LBS | HEART RATE: 70 BPM | OXYGEN SATURATION: 97 %

## 2020-10-21 PROCEDURE — 93000 ELECTROCARDIOGRAM COMPLETE: CPT

## 2020-10-21 PROCEDURE — 99214 OFFICE O/P EST MOD 30 MIN: CPT

## 2020-10-21 NOTE — DISCUSSION/SUMMARY
[FreeTextEntry1] : Adry is doing well from a cardiovascular perspective. She has no recurrent symptoms of angina, at least nothing convincing. I will make no changes to her medications at this time.\par \par Her blood pressure was quite elevated here, but this has been the case previously. We will rely upon her blood pressures from home.\par \par She will need a followup ultrasound of her abdominal aorta around the time of her next visit.

## 2020-10-21 NOTE — HISTORY OF PRESENT ILLNESS
[FreeTextEntry1] : Adry presented to the office today for a followup evaluation. She was last seen in the office 2 weeks ago.\par \par She is now 86 years old, with a history of bladder cancer. She has a history of  hypertension, with meds discontinued when her pressure dropped. In May of 2016, she had an episode of syncope.  While hospitalized, she was observed to have nonsustained ventricular tachycardia. She was transferred for further evaluation. Nuclear stress testing and echocardiography were unremarkable. A loop recorder was implanted, and more recently explanted. She has a 4.0 cm AAA, which has been followed. She has had a myocardial infarction, for which she had PCI of the circumflex and the right coronary artery. \par \par She presented in September with chest discomfort.   She described the symptoms as being similar but not identical to the symptoms which she was experiencing with her myocardial infarction.  Nuclear stress testing revealed fixed inferior and inferolateral defects, suggestive of infarct, with an ejection fraction of 45%. We decided to try to medically manage. Unfortunately, at the end of November, 2019 she presented to the hospital with unstable angina. Cardiac catheterization revealed a severe stenosis distal within a small caliber obtuse marginal, which does serve a relatively significant myocardial territory. It was felt most appropriate to try medical therapy, with PCI reserved only for failure of medical therapy.\par \par At the time of the last visit, she reported chest discomfort suspicious for angina. She was referred to the emergency room, and had PCI of the circumflex.\par \par Since then, she is doing well from the perspective of her own cardiovascular health. She has been under a lot of stress because her  has had recurrent hospitalizations with heart failure and progressive azotemia. She has not had much in the way of chest discomfort, though has had an occasional twinge now and again, which she thinks might be stress. She denies any edema. Her blood pressure at home has been in the 130s.

## 2020-11-09 ENCOUNTER — EMERGENCY (EMERGENCY)
Facility: HOSPITAL | Age: 85
LOS: 1 days | Discharge: HOME | End: 2020-11-09
Attending: STUDENT IN AN ORGANIZED HEALTH CARE EDUCATION/TRAINING PROGRAM | Admitting: STUDENT IN AN ORGANIZED HEALTH CARE EDUCATION/TRAINING PROGRAM
Payer: MEDICARE

## 2020-11-09 VITALS
TEMPERATURE: 98 F | HEART RATE: 60 BPM | SYSTOLIC BLOOD PRESSURE: 182 MMHG | RESPIRATION RATE: 18 BRPM | OXYGEN SATURATION: 100 % | DIASTOLIC BLOOD PRESSURE: 81 MMHG

## 2020-11-09 VITALS
SYSTOLIC BLOOD PRESSURE: 170 MMHG | HEIGHT: 63 IN | RESPIRATION RATE: 18 BRPM | HEART RATE: 59 BPM | WEIGHT: 154.98 LBS | TEMPERATURE: 98 F | DIASTOLIC BLOOD PRESSURE: 76 MMHG

## 2020-11-09 DIAGNOSIS — Z98.49 CATARACT EXTRACTION STATUS, UNSPECIFIED EYE: Chronic | ICD-10-CM

## 2020-11-09 DIAGNOSIS — Z92.89 PERSONAL HISTORY OF OTHER MEDICAL TREATMENT: Chronic | ICD-10-CM

## 2020-11-09 LAB
ALBUMIN SERPL ELPH-MCNC: 3.6 G/DL — SIGNIFICANT CHANGE UP (ref 3.3–5)
ALP SERPL-CCNC: 78 U/L — SIGNIFICANT CHANGE UP (ref 40–120)
ALT FLD-CCNC: 13 U/L — SIGNIFICANT CHANGE UP (ref 12–78)
ANION GAP SERPL CALC-SCNC: 2 MMOL/L — LOW (ref 5–17)
APTT BLD: 30.9 SEC — SIGNIFICANT CHANGE UP (ref 27.5–35.5)
AST SERPL-CCNC: 14 U/L — LOW (ref 15–37)
BASOPHILS # BLD AUTO: 0.01 K/UL — SIGNIFICANT CHANGE UP (ref 0–0.2)
BASOPHILS NFR BLD AUTO: 0.2 % — SIGNIFICANT CHANGE UP (ref 0–2)
BILIRUB SERPL-MCNC: 0.3 MG/DL — SIGNIFICANT CHANGE UP (ref 0.2–1.2)
BUN SERPL-MCNC: 21 MG/DL — SIGNIFICANT CHANGE UP (ref 7–23)
CALCIUM SERPL-MCNC: 8.7 MG/DL — SIGNIFICANT CHANGE UP (ref 8.5–10.1)
CHLORIDE SERPL-SCNC: 112 MMOL/L — HIGH (ref 96–108)
CK MB BLD-MCNC: 3.6 % — HIGH (ref 0–3.5)
CK MB CFR SERPL CALC: 2.1 NG/ML — SIGNIFICANT CHANGE UP (ref 0–3.6)
CK SERPL-CCNC: 59 U/L — SIGNIFICANT CHANGE UP (ref 26–192)
CO2 SERPL-SCNC: 29 MMOL/L — SIGNIFICANT CHANGE UP (ref 22–31)
CREAT SERPL-MCNC: 0.83 MG/DL — SIGNIFICANT CHANGE UP (ref 0.5–1.3)
EOSINOPHIL # BLD AUTO: 0.14 K/UL — SIGNIFICANT CHANGE UP (ref 0–0.5)
EOSINOPHIL NFR BLD AUTO: 2.2 % — SIGNIFICANT CHANGE UP (ref 0–6)
GLUCOSE SERPL-MCNC: 102 MG/DL — HIGH (ref 70–99)
HCT VFR BLD CALC: 36.9 % — SIGNIFICANT CHANGE UP (ref 34.5–45)
HGB BLD-MCNC: 12.1 G/DL — SIGNIFICANT CHANGE UP (ref 11.5–15.5)
IMM GRANULOCYTES NFR BLD AUTO: 0.3 % — SIGNIFICANT CHANGE UP (ref 0–1.5)
INR BLD: 1.41 RATIO — HIGH (ref 0.88–1.16)
LYMPHOCYTES # BLD AUTO: 1.14 K/UL — SIGNIFICANT CHANGE UP (ref 1–3.3)
LYMPHOCYTES # BLD AUTO: 18 % — SIGNIFICANT CHANGE UP (ref 13–44)
MCHC RBC-ENTMCNC: 30.6 PG — SIGNIFICANT CHANGE UP (ref 27–34)
MCHC RBC-ENTMCNC: 32.8 GM/DL — SIGNIFICANT CHANGE UP (ref 32–36)
MCV RBC AUTO: 93.2 FL — SIGNIFICANT CHANGE UP (ref 80–100)
MONOCYTES # BLD AUTO: 0.57 K/UL — SIGNIFICANT CHANGE UP (ref 0–0.9)
MONOCYTES NFR BLD AUTO: 9 % — SIGNIFICANT CHANGE UP (ref 2–14)
NEUTROPHILS # BLD AUTO: 4.45 K/UL — SIGNIFICANT CHANGE UP (ref 1.8–7.4)
NEUTROPHILS NFR BLD AUTO: 70.3 % — SIGNIFICANT CHANGE UP (ref 43–77)
NRBC # BLD: 0 /100 WBCS — SIGNIFICANT CHANGE UP (ref 0–0)
PLATELET # BLD AUTO: 160 K/UL — SIGNIFICANT CHANGE UP (ref 150–400)
POTASSIUM SERPL-MCNC: 4 MMOL/L — SIGNIFICANT CHANGE UP (ref 3.5–5.3)
POTASSIUM SERPL-SCNC: 4 MMOL/L — SIGNIFICANT CHANGE UP (ref 3.5–5.3)
PROT SERPL-MCNC: 7.3 G/DL — SIGNIFICANT CHANGE UP (ref 6–8.3)
PROTHROM AB SERPL-ACNC: 16.2 SEC — HIGH (ref 10.6–13.6)
RBC # BLD: 3.96 M/UL — SIGNIFICANT CHANGE UP (ref 3.8–5.2)
RBC # FLD: 13.2 % — SIGNIFICANT CHANGE UP (ref 10.3–14.5)
SODIUM SERPL-SCNC: 143 MMOL/L — SIGNIFICANT CHANGE UP (ref 135–145)
TROPONIN I SERPL-MCNC: <.015 NG/ML — SIGNIFICANT CHANGE UP (ref 0.01–0.04)
TROPONIN I SERPL-MCNC: <.015 NG/ML — SIGNIFICANT CHANGE UP (ref 0.01–0.04)
WBC # BLD: 6.33 K/UL — SIGNIFICANT CHANGE UP (ref 3.8–10.5)
WBC # FLD AUTO: 6.33 K/UL — SIGNIFICANT CHANGE UP (ref 3.8–10.5)

## 2020-11-09 PROCEDURE — 82553 CREATINE MB FRACTION: CPT

## 2020-11-09 PROCEDURE — 85025 COMPLETE CBC W/AUTO DIFF WBC: CPT

## 2020-11-09 PROCEDURE — 71045 X-RAY EXAM CHEST 1 VIEW: CPT | Mod: 26

## 2020-11-09 PROCEDURE — 93010 ELECTROCARDIOGRAM REPORT: CPT

## 2020-11-09 PROCEDURE — 93005 ELECTROCARDIOGRAM TRACING: CPT

## 2020-11-09 PROCEDURE — 80053 COMPREHEN METABOLIC PANEL: CPT

## 2020-11-09 PROCEDURE — 99285 EMERGENCY DEPT VISIT HI MDM: CPT

## 2020-11-09 PROCEDURE — 36415 COLL VENOUS BLD VENIPUNCTURE: CPT

## 2020-11-09 PROCEDURE — 99284 EMERGENCY DEPT VISIT MOD MDM: CPT | Mod: 25

## 2020-11-09 PROCEDURE — 85730 THROMBOPLASTIN TIME PARTIAL: CPT

## 2020-11-09 PROCEDURE — 82550 ASSAY OF CK (CPK): CPT

## 2020-11-09 PROCEDURE — 84484 ASSAY OF TROPONIN QUANT: CPT

## 2020-11-09 PROCEDURE — 71045 X-RAY EXAM CHEST 1 VIEW: CPT

## 2020-11-09 PROCEDURE — 85610 PROTHROMBIN TIME: CPT

## 2020-11-09 RX ORDER — ASPIRIN/CALCIUM CARB/MAGNESIUM 324 MG
162 TABLET ORAL ONCE
Refills: 0 | Status: COMPLETED | OUTPATIENT
Start: 2020-11-09 | End: 2020-11-09

## 2020-11-09 RX ORDER — ISOSORBIDE MONONITRATE 60 MG/1
1 TABLET, EXTENDED RELEASE ORAL
Qty: 0 | Refills: 0 | DISCHARGE

## 2020-11-09 RX ADMIN — Medication 162 MILLIGRAM(S): at 10:15

## 2020-11-09 NOTE — CONSULT NOTE ADULT - ASSESSMENT
87 yo female with history of  HTN, HLD, CAD x3 stents (PCI to circumflex and RCA s/p MI in 2019 and PCI to OM1 in 11/2020), AAA (non-surgical), bladder cancer who presents with chest discomfort.    CP  -Patient with chest discomfort prior to admission  -Likely anginal in setting of recent elevated stress  -Patient is not complaining of any cardiac symptoms at this time.  -No clear evidence of acute ischemia, trops negative x 1. Will trend 2 more sets  -No acute changes on EKG compared to previous.    CAD  - S/p stents x3 (PCI to circumflex and RCA s/p MI in 2019 and PCI to OM1 in 11/2020)  - BP well controlled, monitor routine hemodynamics.  - Continue ___.  - Monitor and replete lytes, keep K>4, Mg>2.  - Strict I/Os, daily weights.  - Pt has no active ischemia, decompensated heart failure, unstable arrythmia, or severe stenotic valvular disease, and has __ cardiac risk factors. In the setting of low risk ___, he/she is optimized from cardiovascular standpoint to proceed with planned procedure with routine hemodynamic monitoring.   - Pt has no modifiable active cardiac risk factors and in the setting of low risk _____, patient is optimized as best as possible from cardiovascular standpoint to proceed with planned procedure with routine hemodynamic monitoring.   - Other cardiovascular workup will depend on clinical course.  - All other workup per primary team.  - Will continue to follow.             85 yo female with history of  HTN, HLD, CAD x3 stents (PCI to circumflex and RCA s/p MI in 2019 and PCI to OM1 in 11/2020), AAA (non-surgical), bladder cancer who presents with chest discomfort.    CP  -Patient with chest discomfort prior to admission  -Likely recent elevated stress and indigestion; less likely cardiac in nature as patient's symptoms different than prior in which he required intervention  -S/p stents x3 (PCI to circumflex and RCA s/p MI in 2019 and PCI to OM1 in 11/2020)  -Patient is not complaining of any cardiac symptoms at this time.  -EKG with new TWI in lateral leads but may be 2/2 to recent PCI to OM1  -No clear evidence of acute ischemia, trops negative x 1. Will trend 1 more set  -If 2nd set of trops are negative, patient is cleared for DC from cardiac standpoint with close outpatient follow up.  -Continue DAPT    HTN  -BP elevated at this time  -Pt previously on antihypertensives which were DC'ed s/p episode of syncope  -Monitor off meds at this time

## 2020-11-09 NOTE — ED ADULT TRIAGE NOTE - CHIEF COMPLAINT QUOTE
MD benjamin patient, c/o chest discomfort. States it feels similar to indigestion/gerd. History of MI and stent placement. Patient lost  last week here.

## 2020-11-09 NOTE — ED ADULT NURSE NOTE - NSIMPLEMENTINTERV_GEN_ALL_ED
Implemented All Fall Risk Interventions:  [place] to call system. Call bell, personal items and telephone within reach. Instruct patient to call for assistance. Room bathroom lighting operational. Non-slip footwear when patient is off stretcher. Physically safe environment: no spills, clutter or unnecessary equipment. Stretcher in lowest position, wheels locked, appropriate side rails in place. Provide visual cue, wrist band, yellow gown, etc. Monitor gait and stability. Monitor for mental status changes and reorient to person, place, and time. Review medications for side effects contributing to fall risk. Reinforce activity limits and safety measures with patient and family.

## 2020-11-09 NOTE — ED PROVIDER NOTE - OBJECTIVE STATEMENT
87 yo female with history of  HTN, HLD, CAD, prior stents, MI AAA (stable, surveilled), cath in Oct 2020 with 3 stents placed presenting with epigastric pain at 6 am this morning, describes it as indigestion, took a nitro and since then symptoms have resolved. no assoc nausea, vomiting, diaphoresis, sob, fevers, cough  feels like similar to prior cath, pt with a lot of stress recently,   and was buried yesterday

## 2020-11-09 NOTE — CONSULT NOTE ADULT - SUBJECTIVE AND OBJECTIVE BOX
Patient is a 86y old  Female who presents with a chief complaint of     HPI: 85 yo female with history of  HTN, HLD, CAD x3 (PCI to circumflex and RCA s/p MI in 2019 and PCI to OM1 in 11/2020), AAA (non-surgical), bladder cancer who presents with chest discomfort. Patient states symptom first started yesterday evening after getting back from a family gathering which resolved within a few minutes. Patient initially attributed discomfort to eating too much that day. However, patient woke up at 4-5AM with the same discomfort and took nitroglycerin with improvement. States symptoms were similar to episode in 10/2020 at which time she required PCI to OM1. Cardio Dr. Riley, last seen 10/21 for follow up after cath and was feeling well. At this time, patient is feeling well, denies any CP, SOB, palpitations, n/v/d.       PAST MEDICAL & SURGICAL HISTORY:  Neurological complaint  4/29/19 code stroke post cardiac cath c/o LUE weakness, NIHSS score 0, symptoms reolved next day    History of loop recorder  placed by Dr. DHARMESH Ibarra. Removed 2019    Syncope, unspecified syncope type    AAA (abdominal aortic aneurysm)    Coronary artery disease of native artery of native heart with stable angina pectoris    HTN (hypertension)    Back pain, chronic  occasional lower back pain    Benign paroxysmal vertigo of right ear  history of right ear abscess    Bladder cancer  dx 2012 treated with BCG    Status post cataract extraction  OD    History of cardiac monitoring  loop monitor  removed 7/22/19    S/P cystoscopy    S/P D&amp;C    S/P breast biopsy, left      ECHO  FINDINGS: < from: Transthoracic Echocardiogram (04.30.19 @ 17:06) >  Patient name: VANNESA HICKS  YOB: 1934   Age: 85 (F)   MR#: [id number]  Study Date: 4/30/2019  [facility] [place] RDCS  Study quality: Technically fair  Referring Physician: Shamar Cornelius MD  Blood Pressure: 141/65 mmHg  Height: 158 cm  Weight: 74 kg  BSA: 1.8 m2  Heart Rate: 84 mmHg  ------------------------------------------------------------------------  PROCEDURE: Transthoracic echocardiogram with 2-D, M-Mode  and complete spectral and color flow Doppler.  INDICATION: Abnormal electrocardiogram (ECG) (EKG) (R94.31)  ------------------------------------------------------------------------  Dimensions:    Normal Values:  LA:     3.5    2.0 - 4.0 cm  Ao:     2.8    2.0 - 3.8 cm  SEPTUM: 1.0    0.6 - 1.2 cm  PWT:    1.0    0.6 - 1.1 cm  LVIDd:  4.2    3.0 - 5.6 cm  LVIDs:  3.0    1.8 - 4.0 cm  Derived variables:  LVMI: 78 g/m2  RWT: 0.47  Fractional short: 29 %  EF (Visual Estimate): 60-65 %  Doppler Peak Velocity (m/sec): AoV=1.4  ------------------------------------------------------------------------  Observations:  Mitral Valve: Mitral annular calcification, otherwise  normal mitral valve. Minimal mitral regurgitation.  Aortic Valve/Aorta: Aortic valve not well visualized;  appears calcified. Peak transaortic valve gradient equals 8  mm Hg, estimated aortic valve area equals 1.8 sqcm (by  continuity equation), aortic valve velocity time integral  equals 27 cm, consistent with mild aortic stenosis. Minimal  aortic regurgitation.  Peak left ventricular outflow tract  gradient equals 2 mm Hg, LVOT velocity time integral equals  17 cm.  Aortic Root: 2.8 cm.  LVOT diameter: 1.9 cm.  Left Atrium: Normal left atrium.  LA volume index = 29  cc/m2.  Left Ventricle: Endocardium not well visualized; grossly  normal left ventricular systolic function. Increased  relative wall thickness with normal left ventricular mass  index, consistent with concentric left ventricular  remodeling. Moderate diastolic dysfunction (Stage II).  Right Heart: Normal right atrium. The right ventricle is  not well visualized; grossly normal right ventricular  systolic function. Tricuspid valve not well visualized,  probably normal. Minimal tricuspid regurgitation. Pulmonic  valve not well visualized. Minimal pulmonic regurgitation.  Pericardium/Pleura: Normal pericardium with no pericardial  effusion.  Hemodynamic: Estimated right atrial pressure is 8 mm Hg.  Estimated right ventricular systolic pressure equals 12 mm  Hg, assuming right atrial pressure equals 8 mm Hg,  consistent with normal pulmonary pressures.  ------------------------------------------------------------------------  Conclusions:  1. Aortic valve not well visualized; appears calcified.  Peak transaortic valve gradient equals 8 mm Hg, estimated  aortic valve area equals 1.8 sqcm (by continuity equation),  aortic valve velocity time integral equals 27 cm,  consistent with mild aortic stenosis. Minimal aortic  regurgitation.  2. Increased relative wall thickness with normal left  ventricular mass index, consistent with concentric left  ventricular remodeling.  3. Endocardium not well visualized; grossly normal left  ventricular systolic function.  4. Moderate diastolic dysfunction (Stage II).  5. The right ventricle is not well visualized; grossly  normal right ventricular systolic function.  6. Estimated right ventricular systolic pressure equals 12  mm Hg, assuming right atrial pressure equals 8 mm Hg,  consistent with normal pulmonary pressures.  *** No previous Echo exam.  ------------------------------------------------------------------------  Confirmed on  4/30/2019 - 21:42:49 by Logan Carrington M.D.  ------------------------------------------------------------------------    < end of copied text >        MEDICATIONS  (STANDING):    MEDICATIONS  (PRN):      FAMILY HISTORY:  No pertinent family history in first degree relatives    Family history of MI (myocardial infarction) (Sibling)  at 59 years of age    Family history of hypertension    Family history of diabetes mellitus      Denies Family history of CAD or early MI      Constitutional: denies fever, chills  HEENT: denies blurry vision, difficulty hearing  Respiratory: denies SOB, BECERRA, cough  Cardiovascular: denies CP, palpitations, orthopnea, PND, LE edema  Gastrointestinal: denies nausea, vomiting, abdominal pain  Genitourinary: denies urinary changes  Neurologic: denies headache, weakness, dizziness  Hematology/Oncology: denies bleeding, easy bruising      SOCIAL HISTORY:    No tobacco, Alcohol or Ddrug use    Vital Signs Last 24 Hrs  T(C): 36.7 (09 Nov 2020 09:14), Max: 36.7 (09 Nov 2020 09:14)  T(F): 98.1 (09 Nov 2020 09:14), Max: 98.1 (09 Nov 2020 09:14)  HR: 59 (09 Nov 2020 09:14) (59 - 59)  BP: 170/76 (09 Nov 2020 09:14) (170/76 - 170/76)  BP(mean): --  RR: 18 (09 Nov 2020 09:14) (18 - 18)  SpO2: --    Physical Exam:  General: Well developed, well nourished, NAD  HEENT: NCAT, PERRLA, EOMI bl, moist mucous membranes   Neurology: A&Ox3, nonfocal, sensation intact   Respiratory: CTA B/L, No W/R/R  CV: RRR, +S1/S2, no murmurs, rubs or gallops  Abdominal: Soft, NT, ND +BSx4, no palpable masses  Extremities: No C/C/E, + peripheral pulses  Heme: No obvious ecchymosis or petechiae   Skin: warm, dry, normal color      ECG: NSR 60 bpm    I&O's Detail      LABS:                        12.1   6.33  )-----------( 160      ( 09 Nov 2020 10:00 )             36.9           CARDIAC MARKERS ( 09 Nov 2020 10:00 )  <.015 ng/mL / x     / x     / x     / x          PT/INR - ( 09 Nov 2020 10:00 )   PT: 16.2 sec;   INR: 1.41 ratio         PTT - ( 09 Nov 2020 10:00 )  PTT:30.9 sec    I&O's Summary    BNP  RADIOLOGY & ADDITIONAL STUDIES: Patient is a 86y old  Female who presents with a chief complaint of     HPI: 87 yo female with history of  HTN, HLD, CAD x3 (PCI to circumflex and RCA s/p MI in 2019 and PCI to OM1 in 11/2020), AAA (non-surgical), bladder cancer who presents with chest discomfort. Patient states symptom first started yesterday evening after getting back from a family gathering which resolved within a few minutes. Patient initially attributed discomfort to eating too much during the day. However, patient woke up at 4-5AM with the same discomfort and took nitroglycerin with improvement. Reports symptoms lasted for 1 hour. States symptoms were different to episode in 10/2020 at which time she required PCI to OM1. Patient reports increased stress recently due to her 's illness and death 1 week ago. Cardio Dr. Riley, last seen 10/21 for follow up after cath and was feeling well. At this time, patient is feeling well, denies any CP, SOB, palpitations, n/v/d.       PAST MEDICAL & SURGICAL HISTORY:  Neurological complaint  4/29/19 code stroke post cardiac cath c/o LUE weakness, NIHSS score 0, symptoms reolved next day    History of loop recorder  placed by Dr. DHARMESH Ibarra. Removed 2019    Syncope, unspecified syncope type    AAA (abdominal aortic aneurysm)    Coronary artery disease of native artery of native heart with stable angina pectoris    HTN (hypertension)    Back pain, chronic  occasional lower back pain    Benign paroxysmal vertigo of right ear  history of right ear abscess    Bladder cancer  dx 2012 treated with BCG    Status post cataract extraction  OD    History of cardiac monitoring  loop monitor  removed 7/22/19    S/P cystoscopy    S/P D&amp;C    S/P breast biopsy, left      ECHO  FINDINGS: < from: Transthoracic Echocardiogram (04.30.19 @ 17:06) >  Patient name: VANNESA HICKS  YOB: 1934   Age: 85 (F)   MR#: [id number]  Study Date: 4/30/2019  [facility] RD[facility]  Study quality: Technically fair  Referring Physician: Shamar Cornelius MD  Blood Pressure: 141/65 mmHg  Height: 158 cm  Weight: 74 kg  BSA: 1.8 m2  Heart Rate: 84 mmHg  ------------------------------------------------------------------------  PROCEDURE: Transthoracic echocardiogram with 2-D, M-Mode  and complete spectral and color flow Doppler.  INDICATION: Abnormal electrocardiogram (ECG) (EKG) (R94.31)  ------------------------------------------------------------------------  Dimensions:    Normal Values:  LA:     3.5    2.0 - 4.0 cm  Ao:     2.8    2.0 - 3.8 cm  SEPTUM: 1.0    0.6 - 1.2 cm  PWT:    1.0    0.6 - 1.1 cm  LVIDd:  4.2    3.0 - 5.6 cm  LVIDs:  3.0    1.8 - 4.0 cm  Derived variables:  LVMI: 78 g/m2  RWT: 0.47  Fractional short: 29 %  EF (Visual Estimate): 60-65 %  Doppler Peak Velocity (m/sec): AoV=1.4  ------------------------------------------------------------------------  Observations:  Mitral Valve: Mitral annular calcification, otherwise  normal mitral valve. Minimal mitral regurgitation.  Aortic Valve/Aorta: Aortic valve not well visualized;  appears calcified. Peak transaortic valve gradient equals 8  mm Hg, estimated aortic valve area equals 1.8 sqcm (by  continuity equation), aortic valve velocity time integral  equals 27 cm, consistent with mild aortic stenosis. Minimal  aortic regurgitation.  Peak left ventricular outflow tract  gradient equals 2 mm Hg, LVOT velocity time integral equals  17 cm.  Aortic Root: 2.8 cm.  LVOT diameter: 1.9 cm.  Left Atrium: Normal left atrium.  LA volume index = 29  cc/m2.  Left Ventricle: Endocardium not well visualized; grossly  normal left ventricular systolic function. Increased  relative wall thickness with normal left ventricular mass  index, consistent with concentric left ventricular  remodeling. Moderate diastolic dysfunction (Stage II).  Right Heart: Normal right atrium. The right ventricle is  not well visualized; grossly normal right ventricular  systolic function. Tricuspid valve not well visualized,  probably normal. Minimal tricuspid regurgitation. Pulmonic  valve not well visualized. Minimal pulmonic regurgitation.  Pericardium/Pleura: Normal pericardium with no pericardial  effusion.  Hemodynamic: Estimated right atrial pressure is 8 mm Hg.  Estimated right ventricular systolic pressure equals 12 mm  Hg, assuming right atrial pressure equals 8 mm Hg,  consistent with normal pulmonary pressures.  ------------------------------------------------------------------------  Conclusions:  1. Aortic valve not well visualized; appears calcified.  Peak transaortic valve gradient equals 8 mm Hg, estimated  aortic valve area equals 1.8 sqcm (by continuity equation),  aortic valve velocity time integral equals 27 cm,  consistent with mild aortic stenosis. Minimal aortic  regurgitation.  2. Increased relative wall thickness with normal left  ventricular mass index, consistent with concentric left  ventricular remodeling.  3. Endocardium not well visualized; grossly normal left  ventricular systolic function.  4. Moderate diastolic dysfunction (Stage II).  5. The right ventricle is not well visualized; grossly  normal right ventricular systolic function.  6. Estimated right ventricular systolic pressure equals 12  mm Hg, assuming right atrial pressure equals 8 mm Hg,  consistent with normal pulmonary pressures.  *** No previous Echo exam.  ------------------------------------------------------------------------  Confirmed on  4/30/2019 - 21:42:49 by Logan Carrington M.D.  ------------------------------------------------------------------------    < end of copied text >        MEDICATIONS  (STANDING):    MEDICATIONS  (PRN):      FAMILY HISTORY:  No pertinent family history in first degree relatives    Family history of MI (myocardial infarction) (Sibling)  at 59 years of age    Family history of hypertension    Family history of diabetes mellitus      Denies Family history of CAD or early MI      Constitutional: denies fever, chills  HEENT: denies blurry vision, difficulty hearing  Respiratory: denies SOB, BECERRA, cough  Cardiovascular: denies CP, palpitations, orthopnea, PND, LE edema  Gastrointestinal: denies nausea, vomiting, abdominal pain  Genitourinary: denies urinary changes  Neurologic: denies headache, weakness, dizziness  Hematology/Oncology: denies bleeding, easy bruising      SOCIAL HISTORY:    No tobacco, Alcohol or Ddrug use    Vital Signs Last 24 Hrs  T(C): 36.7 (09 Nov 2020 09:14), Max: 36.7 (09 Nov 2020 09:14)  T(F): 98.1 (09 Nov 2020 09:14), Max: 98.1 (09 Nov 2020 09:14)  HR: 59 (09 Nov 2020 09:14) (59 - 59)  BP: 170/76 (09 Nov 2020 09:14) (170/76 - 170/76)  BP(mean): --  RR: 18 (09 Nov 2020 09:14) (18 - 18)  SpO2: --    Physical Exam:  General: Well developed, well nourished, NAD  HEENT: NCAT, PERRLA, EOMI bl, moist mucous membranes   Neurology: A&Ox3, nonfocal, sensation intact   Respiratory: CTA B/L, No W/R/R  CV: RRR, +S1/S2, no murmurs, rubs or gallops  Abdominal: Soft, NT, ND +BSx4, no palpable masses  Extremities: No C/C/E, + peripheral pulses  Heme: No obvious ecchymosis or petechiae   Skin: warm, dry, normal color      ECG: NSR 60 bpm    I&O's Detail      LABS:                        12.1   6.33  )-----------( 160      ( 09 Nov 2020 10:00 )             36.9           CARDIAC MARKERS ( 09 Nov 2020 10:00 )  <.015 ng/mL / x     / x     / x     / x          PT/INR - ( 09 Nov 2020 10:00 )   PT: 16.2 sec;   INR: 1.41 ratio         PTT - ( 09 Nov 2020 10:00 )  PTT:30.9 sec    I&O's Summary    BNP  RADIOLOGY & ADDITIONAL STUDIES:

## 2020-11-09 NOTE — CONSULT NOTE ADULT - ATTENDING COMMENTS
The patient was personally seen and examined, in addition to being examined and evaluated by housestaff.  All elements of the note were edited where appropriate.    known cad, recent pci  atypical sxs more likely GI in context  first ce normal hours after 1 hour of sxs  dc home if second set neg

## 2020-11-09 NOTE — ED PROVIDER NOTE - CARE PROVIDER_API CALL
Miguel Riley)  Cardiovascular Disease  [address] [place] [address]  Phone: [phone]  Fax: [phone]  Follow Up Time: 4-6 Days

## 2020-11-09 NOTE — ED PROVIDER NOTE - PHYSICAL EXAMINATION
Gen: Well appearing in NAD  Head: NC/AT  Neck: trachea midline  cv: rrr  Resp:  No distress, lung clear  abd: nontender  Ext: no deformities  Neuro:  A&O appears non focal  Skin:  Warm and dry as visualized  Psych:  Normal affect and mood

## 2020-11-09 NOTE — ED PROVIDER NOTE - PATIENT PORTAL LINK FT
You can access the FollowMyHealth Patient Portal offered by [facility] by registering at the following website: [email or web address]. By joining [facility]’s FollowMyHealth portal, you will also be able to view your health information using other applications (apps) compatible with our system.

## 2020-11-09 NOTE — ED PROVIDER NOTE - NSFOLLOWUPINSTRUCTIONS_ED_ALL_ED_FT
1. TAKE ALL MEDICATIONS AS DIRECTED.    2. FOR PAIN OR FEVER YOU CAN TAKE ACETAMINOPHEN (TYLENOL) AS NEEDED, AS DIRECTED ON PACKAGING.  3. FOLLOW UP WITH YOUR PRIMARY DOCTOR WITHIN 5 DAYS AS DIRECTED.  4. IF YOU HAD LABS OR IMAGING DONE, YOU WERE GIVEN COPIES OF ALL LABS AND/OR IMAGING RESULTS FROM YOUR ER VISIT--PLEASE TAKE THEM WITH YOU TO YOUR FOLLOW UP APPOINTMENTS.  5. IF NEEDED, CALL PATIENT ACCESS SERVICES AT [phone] TO FIND A PRIMARY CARE PHYSICIAN.  OR CALL [phone] TO MAKE AN APPOINTMENT WITH THE CLINIC.  6. RETURN TO THE ER FOR ANY WORSENING SYMPTOMS OR CONCERNS.    Follow up with Dr Riley     Chest Pain    Chest pain can be caused by many different conditions which may or may not be dangerous. Causes include heartburn, lung infections, heart attack, blood clot in lungs, skin infections, strain or damage to muscle, cartilage, or bones, etc. In addition to a history and physical examination, an electrocardiogram (ECG) or other lab tests may have been performed to determine the cause of your chest pain. Follow up with your primary care provider or with a cardiologist as instructed.     SEEK IMMEDIATE MEDICAL CARE IF YOU HAVE ANY OF THE FOLLOWING SYMPTOMS: worsening chest pain, coughing up blood, unexplained back/neck/jaw pain, severe abdominal pain, dizziness or lightheadedness, fainting, shortness of breath, sweaty or clammy skin, vomiting, or racing heart beat. These symptoms may represent a serious problem that is an emergency. Do not wait to see if the symptoms will go away. Get medical help right away. Call [phone] and do not drive yourself to the hospital.

## 2020-11-09 NOTE — ED PROVIDER NOTE - CAS EDP CONSULT REGARDING 1
consult Post-Care Instructions: Patient instructed to not lie down for 4 hours and limit physical activity for 24 hours. Patient instructed not to travel by airplane for 48 hours.

## 2020-11-11 DIAGNOSIS — Z78.9 OTHER SPECIFIED HEALTH STATUS: ICD-10-CM

## 2020-11-11 DIAGNOSIS — Z82.49 FAMILY HISTORY OF ISCHEMIC HEART DISEASE AND OTHER DISEASES OF THE CIRCULATORY SYSTEM: ICD-10-CM

## 2020-11-11 DIAGNOSIS — Z82.3 FAMILY HISTORY OF STROKE: ICD-10-CM

## 2020-11-11 RX ORDER — ISOSORBIDE MONONITRATE 60 MG/1
60 TABLET, EXTENDED RELEASE ORAL
Refills: 0 | Status: DISCONTINUED | COMMUNITY
End: 2020-11-11

## 2020-11-11 RX ORDER — ASPIRIN ENTERIC COATED TABLETS 81 MG 81 MG/1
81 TABLET, DELAYED RELEASE ORAL
Qty: 90 | Refills: 2 | Status: DISCONTINUED | COMMUNITY
Start: 2019-10-28 | End: 2020-11-11

## 2020-11-11 RX ORDER — CLOPIDOGREL BISULFATE 75 MG/1
75 TABLET, FILM COATED ORAL
Refills: 0 | Status: DISCONTINUED | COMMUNITY
End: 2020-11-11

## 2020-11-11 RX ORDER — CHOLECALCIFEROL (VITAMIN D3) 25 MCG
2500 TABLET,CHEWABLE ORAL DAILY
Refills: 0 | Status: ACTIVE | COMMUNITY

## 2020-11-11 RX ORDER — EVOLOCUMAB 140 MG/ML
140 INJECTION, SOLUTION SUBCUTANEOUS
Qty: 6 | Refills: 3 | Status: DISCONTINUED | COMMUNITY
Start: 2019-12-11 | End: 2020-11-11

## 2020-11-16 ENCOUNTER — APPOINTMENT (OUTPATIENT)
Dept: CARDIOLOGY | Facility: CLINIC | Age: 85
End: 2020-11-16
Payer: MEDICARE

## 2020-11-16 ENCOUNTER — NON-APPOINTMENT (OUTPATIENT)
Age: 85
End: 2020-11-16

## 2020-11-16 VITALS
BODY MASS INDEX: 26.93 KG/M2 | HEART RATE: 56 BPM | HEIGHT: 63 IN | DIASTOLIC BLOOD PRESSURE: 70 MMHG | OXYGEN SATURATION: 97 % | WEIGHT: 152 LBS | SYSTOLIC BLOOD PRESSURE: 171 MMHG

## 2020-11-16 PROCEDURE — 93000 ELECTROCARDIOGRAM COMPLETE: CPT

## 2020-11-16 PROCEDURE — 99215 OFFICE O/P EST HI 40 MIN: CPT

## 2020-11-16 PROCEDURE — 99072 ADDL SUPL MATRL&STAF TM PHE: CPT

## 2020-11-16 NOTE — HISTORY OF PRESENT ILLNESS
[FreeTextEntry1] : Adry presented to the office today for a followup evaluation. She was last seen in the office 3 weeks ago.\par \par She is now 86 years old, with a history of bladder cancer. She has a history of  hypertension, with meds discontinued when her pressure dropped. In May of 2016, she had an episode of syncope.  While hospitalized, she was observed to have nonsustained ventricular tachycardia. She was transferred for further evaluation. Nuclear stress testing and echocardiography were unremarkable. A loop recorder was implanted, and more recently explanted. She has a 4.0 cm AAA, which has been followed. She has had a myocardial infarction, for which she had PCI of the circumflex and the right coronary artery. \par \par She presented in September with chest discomfort.   She described the symptoms as being similar but not identical to the symptoms which she was experiencing with her myocardial infarction.  Nuclear stress testing revealed fixed inferior and inferolateral defects, suggestive of infarct, with an ejection fraction of 45%. We decided to try to medically manage. Unfortunately, at the end of  she presented to the hospital with unstable angina. Cardiac catheterization revealed a severe stenosis distal within a small caliber obtuse marginal, which does serve a relatively significant myocardial territory. It was felt most appropriate to try medical therapy, with PCI reserved only for failure of medical therapy.\par \par In early October, she reported chest discomfort suspicious for angina. She was referred to the emergency room, and had PCI of the circumflex. Shortly afterward her   after a long illness.  She was seen in the ER with atypical chest pain after that and was discharged after several sets of enzymes.\par \par Since then, she is doing well from the perspective of her own health, though she is depressed. She has not had chest discomfort. Her blood pressure at home has been in the 130s.

## 2020-11-16 NOTE — DISCUSSION/SUMMARY
[FreeTextEntry1] : Adry is doing well from a cardiovascular perspective. She has no recurrent symptoms of angina. I will make no changes to her medications at this time.\par \par Her blood pressure was quite elevated here, but this has been the case previously. We will rely upon her blood pressures from home.\par  \par We had an extensive conversation about the grieving process.

## 2020-12-18 ENCOUNTER — RX RENEWAL (OUTPATIENT)
Age: 85
End: 2020-12-18

## 2021-01-19 ENCOUNTER — NON-APPOINTMENT (OUTPATIENT)
Age: 86
End: 2021-01-19

## 2021-01-19 ENCOUNTER — APPOINTMENT (OUTPATIENT)
Dept: CARDIOLOGY | Facility: CLINIC | Age: 86
End: 2021-01-19
Payer: MEDICARE

## 2021-01-19 VITALS
BODY MASS INDEX: 28 KG/M2 | HEART RATE: 56 BPM | SYSTOLIC BLOOD PRESSURE: 179 MMHG | WEIGHT: 158 LBS | HEIGHT: 63 IN | OXYGEN SATURATION: 95 % | DIASTOLIC BLOOD PRESSURE: 75 MMHG

## 2021-01-19 PROCEDURE — 99214 OFFICE O/P EST MOD 30 MIN: CPT

## 2021-01-19 PROCEDURE — 99072 ADDL SUPL MATRL&STAF TM PHE: CPT

## 2021-01-19 PROCEDURE — 93000 ELECTROCARDIOGRAM COMPLETE: CPT

## 2021-01-19 NOTE — PHYSICAL EXAM
[Normal Appearance] : normal appearance [General Appearance - In No Acute Distress] : no acute distress [Normal Conjunctiva] : the conjunctiva exhibited no abnormalities [Normal Oral Mucosa] : normal oral mucosa [Normal Jugular Venous V Waves Present] : normal jugular venous V waves present [Respiration, Rhythm And Depth] : normal respiratory rhythm and effort [Exaggerated Use Of Accessory Muscles For Inspiration] : no accessory muscle use [Auscultation Breath Sounds / Voice Sounds] : lungs were clear to auscultation bilaterally [Abdomen Soft] : soft [Abdomen Tenderness] : non-tender [Abdomen Mass (___ Cm)] : no abdominal mass palpated [Abnormal Walk] : normal gait [Nail Clubbing] : no clubbing of the fingernails [Cyanosis, Localized] : no localized cyanosis [Skin Color & Pigmentation] : normal skin color and pigmentation [Petechial Hemorrhages (___cm)] : no petechial hemorrhages [] : no rash [No Venous Stasis] : no venous stasis [Skin Lesions] : no skin lesions [Oriented To Time, Place, And Person] : oriented to person, place, and time [Affect] : the affect was normal [Mood] : the mood was normal [No Anxiety] : not feeling anxious [Not Palpable] : not palpable [No Precordial Heave] : no precordial heave was noted [Normal Rate] : normal [Rhythm Regular] : regular [Normal S1] : normal S1 [Normal S2] : normal S2 [No Gallop] : no gallop heard [No Murmur] : no murmurs heard [Right Carotid Bruit] : right carotid bruit heard [Left Carotid Bruit] : left carotid bruit heard [1+] : left 1+ [___ +] : bilateral [unfilled]U+ pretibial pitting edema [FreeTextEntry1] : No JVD [Bruit] : no bruit heard

## 2021-01-19 NOTE — DISCUSSION/SUMMARY
[FreeTextEntry1] : Adry is doing well from a cardiovascular perspective. She has no recurrent symptoms of angina. I will make no changes to her medications at this time.\par \par Her blood pressure was quite elevated here, but this has been the case previously. We will rely upon her blood pressures from home.\par  \par We had an extensive conversation about the grieving process. \par \par We will schedule an ultrasound of her abdominal aorta.\par \par I will see her in 6 months, if all is well. Mayi Ruiz/Health Care Proxy (HCP)

## 2021-01-19 NOTE — HISTORY OF PRESENT ILLNESS
[FreeTextEntry1] : Adry presented to the office today for a followup evaluation. She was last seen in the office in November.\par \par She is now 86 years old, with a history of bladder cancer. She has a history of  hypertension, with meds discontinued when her pressure dropped. In May of 2016, she had an episode of syncope.  While hospitalized, she was observed to have nonsustained ventricular tachycardia. She was transferred for further evaluation. Nuclear stress testing and echocardiography were unremarkable. A loop recorder was implanted, and more recently explanted. She has a 4.0 cm AAA, which has been followed. She has had a myocardial infarction, for which she had PCI of the circumflex and the right coronary artery. \par \par She presented in September with chest discomfort.   She described the symptoms as being similar but not identical to the symptoms which she was experiencing with her myocardial infarction.  Nuclear stress testing revealed fixed inferior and inferolateral defects, suggestive of infarct, with an ejection fraction of 45%. We decided to try to medically manage. Unfortunately, at the end of  she presented to the hospital with unstable angina. Cardiac catheterization revealed a severe stenosis distal within a small caliber obtuse marginal, which does serve a relatively significant myocardial territory. It was felt most appropriate to try medical therapy, with PCI reserved only for failure of medical therapy.\par \par In early October, she reported chest discomfort suspicious for angina. She was referred to the emergency room, and had PCI of the circumflex. Shortly afterward her   after a long illness.  She was seen in the ER with atypical chest pain after that and was discharged after several sets of enzymes.\par \par Since then, she is doing well from the perspective of her own health, though she is depressed. She has not had chest discomfort. Her blood pressure at home has been in the 130s.

## 2021-01-29 DIAGNOSIS — R20.0 ANESTHESIA OF SKIN: ICD-10-CM

## 2021-02-03 ENCOUNTER — APPOINTMENT (OUTPATIENT)
Dept: CARDIOLOGY | Facility: CLINIC | Age: 86
End: 2021-02-03
Payer: MEDICARE

## 2021-02-03 PROCEDURE — 93978 VASCULAR STUDY: CPT

## 2021-02-03 PROCEDURE — 99072 ADDL SUPL MATRL&STAF TM PHE: CPT

## 2021-02-08 ENCOUNTER — RX RENEWAL (OUTPATIENT)
Age: 86
End: 2021-02-08

## 2021-02-08 ENCOUNTER — APPOINTMENT (OUTPATIENT)
Dept: CARDIOLOGY | Facility: CLINIC | Age: 86
End: 2021-02-08

## 2021-02-19 NOTE — ED PROVIDER NOTE - NORMAL, MLM
Patient:   PARKER NORTON            MRN: [id number]            FIN: [id number]               Age:   36 years     Sex:  Male     :  80   Associated Diagnoses:   Anterior dislocation of left hip   Author:   Souleymane Rooney DO      Basic Information   Time seen: Immediately upon arrival.   History source: Patient.   Arrival mode: Ambulance-ALS.   History limitation: None.   Additional information: Chief Complaint from Nursing Triage Note : Chief Complaint   17 08:12           Chief Complaint           fall level 3  .      History of Present Illness   The patient presents with left.  The onset was just prior to arrival.  The course/duration of symptoms is constant.  Type of injury: fall.  Location: Left hip. The character of symptoms is pain, no swelling, not tingling, not numbness, not redness and no bleeding.  The degree at onset was moderate.  The degree at present is moderate.  There are exacerbating factors including movement and walking.  The relieving factor is none.  The location where the incident occurred was in the street.  Risk factors consist of none.  Therapy today: emergency medical services.  Associated symptoms: denies fever, denies chills, denies nausea, denies vomiting, denies chest pain, denies syncope, denies dizziness, denies palpitations, denies back pain and denies abdominal pain.  This is a 36-year-old gentleman that was found asleep in his car reportedly using cocaine and drinking and recreational drug use saw the police and started to run initially was told that he tripped and fell landing on his butt and then immediately had left hip pain and could not ambulate there was some confusion because another  said that they actually had to emil the patient and tackled to the ground so unclear what the mechanism was the patient does have pain on range of motion active and passive of the leg neurovascularly intact but was brought in here for evaluation.         Review of Systems             Additional review of systems information: All other systems reviewed and otherwise negative.      Health Status   Allergies:    Allergic Reactions (Selected)  NKA.   Medications:  (Selected)   Prescriptions  Prescribed  ibuprofen 800 mg oral tablet: 800 mg, 1 tab, PO, q8hr, 42 tab.      Past Medical/ Family/ Social History      Medical history   Negative.   Surgical history: Negative.   Family history: Not significant.   Social history: Alcohol use: Occasionally, Drug use: Cocaine.   Problem list: Per nurse's notes.      Physical Examination               Vital Signs   Vital Signs   08/27/17 08:12           Temperature Oral          98.0 F                             Peripheral Pulse Rate     114 bpm  HI                             Respiratory Rate          26 br/min  HI                             Systolic Blood Pressure   137 mmHg                             Diastolic Blood Pressure  93 mmHg  HI                             Mean Arterial Pressure    108 mmHg                             Oxygen Saturation         98 %  .   Measurements   08/27/17 08:12           Height                    170 cm                             Weight                    84 kg                             Weight Type               Estimated                             Weight Method             Other: per pt  .   Basic Oxygen Information   08/27/17 08:20           Time Seen by Provider     08/27/17 08:20                             Emergency Department Note-Physician       ED Time Seen By Provider    08/27/17 08:14           ECG - 12 Lead             Completed  (In Progress)   08/27/17 08:12           Height                    170 cm                             Weight                    84 kg                             Weight Type               Estimated                             Weight Method             Other: per pt                             Temperature Oral          98.0 F                              Peripheral Pulse Rate     114 bpm  HI                             Respiratory Rate          26 br/min  HI                             Systolic Blood Pressure   137 mmHg                             Diastolic Blood Pressure  93 mmHg  HI                             Mean Arterial Pressure    108 mmHg                             Oxygen Saturation         98 %                             Oxygen Therapy            Room air                             Emergency Department Note-Nursing         ED Vital Signs  .   General:  Alert, no acute distress.    Skin:  Warm, dry, pink, intact.    Head:  Normocephalic, atraumatic.    Neck:  Trachea midline, no tenderness, no JVD.    Eye:  Pupils are equal, round and reactive to light, extraocular movements are intact, normal conjunctiva, vision grossly normal.    Cardiovascular:  Regular rate and rhythm, No murmur, Normal peripheral perfusion.    Respiratory:  Lungs are clear to auscultation, respirations are non-labored, breath sounds are equal, Symmetrical chest wall expansion.    Chest wall:  No tenderness, No deformity.    Back:  Nontender, Normal range of motion, Normal alignment.    Musculoskeletal:  No swelling, Shortening and external rotation of hip, left    Normal range of motion of left knee and ankle joint    Normal range of motion of hip, right knee, right ankle, not normal ROM, not normal strength.    Gastrointestinal:  Soft, Nontender, Non distended, Normal bowel sounds.    Neurological:  Alert and oriented to person, place, time, and situation, No focal neurological deficit observed, normal sensory observed, normal motor observed, normal speech observed.    Psychiatric:  Cooperative, appropriate mood & affect.       Medical Decision Making   Differential Diagnosis:  Hip fracture, hip sprain, hip dislocation, pelvic fracture, femoral shaft fracture, fall, hip strain, tendonitis, bursitis, herniated disc, sciatica, groin strain.    Rationale:  This is a 36-year-old  gentleman that had trauma to the hip and has anterior superior dislocation of the left hip patient had conscious sedation and reduction of hip joint and good anatomical alignment at this point we have a call out to on-call orthodromic sure there is nothing additional needs to be done here in the hospital otherwise patient is in police custody and will be discharged with follow-up as an outpatient as needed with our on-call or so as I said still waiting for a call back at this time on-call orthosis been paged at 3 times to date, Case was discussed with the on-call Orth their input was as long as it shows a normal reduction with no bony fragments patient will be stable to be discharged no Orso apparatus and needed to protect the hip and refer to them as an outpatient as needed if it improves with no pain no recent follow-up otherwise if still having discomfort in a week call to schedule appointment.       Procedure   Procedural sedation   Confirmed: Patient and procedure correct, Time-out taken prior to procedure, Patient was judged to be a satisfactory risk for the procedure.    Consent: Patient.    Indication: Closed reduction.    Monitoring: Cardiac, blood pressure, continuous pulse oximetry, See nurse's notes.    Preparation: Suction, IV access, Constant attendance, Supplemental oxygen.    ASA Class: I- healthy patient.    Mallampati class: I-soft palate, fauces, uvula pillars.    Physical exam: See physical exam documentation, Airway: appears normal, Heart: regular rate and rhythm, Breath sounds: equal, Neuro: normal.    Pre sedation vital signs: See nurse's notes.    Procedural sedation: The patient was given 80 mg of ketamine followed by 100 mg of propofol IV push intravenously until adequate sedation was achieved. See nurses notes for details. After satisfactory sedation and analgesia were achieved, the procedure was performed.  Please see procedure note for details.    .    Post sedation vital signs: See  nurse's notes.    Procedure Time: See hospital procedure form.    Post sedation condition: Stable, Patient was observed until back to baseline mental status and tolerated the procedural sedation well.  Total time spent was > 15 minutes.    Patient tolerated: Well.    Complications: None.    Performed by: Self.    Fracture/ Dislocation Procedure   Confirmed: Patient, procedure, side, and site correct, Time-out taken prior to procedure, Timeout at 10:59 AM.    Consent: Patient, Has given verbal consent.    Indication: Dislocation.    Location: Left, hip, not shoulder   Pre procedure exam: Circulation, motor, and sensory intact.    Procedural sedation: Ketamine 80 mg, propofol 100 mg , IV.    Monitoring: Cardiac, blood pressure, continuous pulse oximetry, See nurse's notes.    Technique: Using normal accepted technique the left hip was easily manipulated and we quickly achieved adequate reduction.  Immediately following, the left hip was moved through all range of motion confirming adequate reduction..    Post-procedure exam: Circulation, motor, and sensory intact, Alignment improved, Recovered from sedation, Post procedure x rays were ordered to confirm reduction..    Immobilization:  not immobilizer    Patient tolerated: Well.    Complications: None.    Performed by: Self.    Total time: 30 minutes.       Impression and Plan   Diagnosis   Anterior dislocation of left hip ([id number] S73.035A, Discharge, Medical)      Calls-Consults   -  Rodriguez SANTO, Dillon RHOADES, Orthopedics, consult.    Plan   Condition: Improved.    Disposition: Discharged: Time  08/27/17 13:15:00, to police.    Prescriptions: Launch prescriptions   Pharmacy:  Naprosyn 500 mg oral tablet (Prescribe): 500 mg, 1 tab(s), PO, BID, for 7 day, PRN:  for pain, 14 tab, 0 Refill(s).    Patient was given the following educational materials: Hip Dislocation.    Follow up with: Dillon Frias Within As needed; Return to Emergency Department Return if symptoms  worsen.    Counseled: Patient, Regarding diagnosis, Regarding diagnostic results, Regarding treatment plan, Regarding prescription, Patient indicated understanding of instructions.         erendira all pertinent systems normal

## 2021-02-24 NOTE — ED ADULT TRIAGE NOTE - NS ED NURSE DIRECT TO ROOM YN
Pt continues to try and pull out iv and remove monitor leads, pulse ox, clothes. Staff sitting at bedside to redirect, and safety. Pt brief checked clean at this time.      Diallo Alvarado, STEPHY  36/32/26 [id number] Yes

## 2021-06-01 ENCOUNTER — RX RENEWAL (OUTPATIENT)
Age: 86
End: 2021-06-01

## 2021-07-12 ENCOUNTER — APPOINTMENT (OUTPATIENT)
Dept: CARDIOLOGY | Facility: CLINIC | Age: 86
End: 2021-07-12
Payer: MEDICARE

## 2021-07-12 ENCOUNTER — NON-APPOINTMENT (OUTPATIENT)
Age: 86
End: 2021-07-12

## 2021-07-12 VITALS
WEIGHT: 168 LBS | OXYGEN SATURATION: 93 % | SYSTOLIC BLOOD PRESSURE: 154 MMHG | DIASTOLIC BLOOD PRESSURE: 67 MMHG | HEART RATE: 57 BPM | BODY MASS INDEX: 29.77 KG/M2 | HEIGHT: 63 IN

## 2021-07-12 PROCEDURE — 99072 ADDL SUPL MATRL&STAF TM PHE: CPT

## 2021-07-12 PROCEDURE — 99214 OFFICE O/P EST MOD 30 MIN: CPT

## 2021-07-12 PROCEDURE — 93000 ELECTROCARDIOGRAM COMPLETE: CPT

## 2021-07-12 NOTE — DISCUSSION/SUMMARY
[FreeTextEntry1] : Adry is doing well from a cardiovascular perspective. She has no recurrent symptoms of angina. I will make no changes to her medications at this time.\par \par Her blood pressure was quite elevated here, but this has been the case previously. We will rely upon her blood pressures from home.\par  \par We had an extensive conversation about the grieving process. \par \par We will schedule an ultrasound of her abdominal aorta.  She will also schedule a follow-up echocardiogram.  I will be in contact with her to discuss the results.\par \par She will call me in October, so that we can discuss the possibility of stopping Plavix.  As long as she remains without recurrent symptoms of angina, I think that would be reasonable.  She will see me in 6 months.

## 2021-07-12 NOTE — HISTORY OF PRESENT ILLNESS
[FreeTextEntry1] : Adry presented to the office today for a followup evaluation. She was last seen in the office in January.\par \par She is now 86 years old, with a history of bladder cancer. She has a history of  hypertension, with meds discontinued when her pressure dropped. In May of 2016, she had an episode of syncope.  While hospitalized, she was observed to have nonsustained ventricular tachycardia. She was transferred for further evaluation. Nuclear stress testing and echocardiography were unremarkable. A loop recorder was implanted, and more recently explanted. She has a 4.0 cm AAA, which has been followed. She has had a myocardial infarction, for which she had PCI of the circumflex and the right coronary artery. \par \par She presented in September with chest discomfort.   She described the symptoms as being similar but not identical to the symptoms which she was experiencing with her myocardial infarction.  Nuclear stress testing revealed fixed inferior and inferolateral defects, suggestive of infarct, with an ejection fraction of 45%. We decided to try to medically manage. Unfortunately, at the end of  she presented to the hospital with unstable angina. Cardiac catheterization revealed a severe stenosis distal within a small caliber obtuse marginal, which does serve a relatively significant myocardial territory. It was felt most appropriate to try medical therapy, with PCI reserved only for failure of medical therapy.\par \par In early , she reported chest discomfort suspicious for angina. She was referred to the emergency room, and had PCI of the circumflex. Shortly afterward her   after a long illness.  She was seen in the ER with atypical chest pain after that and was discharged after several sets of enzymes.\par \par She has been feeling well from a cardiovascular perspective over the last 6 months or so.  She does not report any chest discomfort or shortness of breath.  She is been compliant with all of her medications.  Her blood pressure at home has been excellent.  She has not had recent blood work.  She is interested in stopping Plavix, if that is possible.  She is very excited about the upcoming birth of a great grandchild, a boy, to be born in November.  He continues to grieve, and is tearful when she discusses her late  Darryl.

## 2021-07-13 LAB
25(OH)D3 SERPL-MCNC: 62.7 NG/ML
ALBUMIN SERPL ELPH-MCNC: 4.4 G/DL
ALP BLD-CCNC: 83 U/L
ALT SERPL-CCNC: 12 U/L
ANION GAP SERPL CALC-SCNC: 13 MMOL/L
AST SERPL-CCNC: 21 U/L
BASOPHILS # BLD AUTO: 0.02 K/UL
BASOPHILS NFR BLD AUTO: 0.4 %
BILIRUB SERPL-MCNC: 0.3 MG/DL
BUN SERPL-MCNC: 28 MG/DL
CALCIUM SERPL-MCNC: 9.7 MG/DL
CHLORIDE SERPL-SCNC: 105 MMOL/L
CHOLEST SERPL-MCNC: 153 MG/DL
CO2 SERPL-SCNC: 25 MMOL/L
CREAT SERPL-MCNC: 0.86 MG/DL
EOSINOPHIL # BLD AUTO: 0.14 K/UL
EOSINOPHIL NFR BLD AUTO: 2.5 %
ESTIMATED AVERAGE GLUCOSE: 134 MG/DL
GLUCOSE SERPL-MCNC: 112 MG/DL
HBA1C MFR BLD HPLC: 6.3 %
HCT VFR BLD CALC: 38.8 %
HDLC SERPL-MCNC: 65 MG/DL
HGB BLD-MCNC: 12.3 G/DL
IMM GRANULOCYTES NFR BLD AUTO: 0.2 %
LDLC SERPL CALC-MCNC: 69 MG/DL
LYMPHOCYTES # BLD AUTO: 1.24 K/UL
LYMPHOCYTES NFR BLD AUTO: 21.8 %
MAN DIFF?: NORMAL
MCHC RBC-ENTMCNC: 30.1 PG
MCHC RBC-ENTMCNC: 31.7 GM/DL
MCV RBC AUTO: 94.9 FL
MONOCYTES # BLD AUTO: 0.61 K/UL
MONOCYTES NFR BLD AUTO: 10.7 %
NEUTROPHILS # BLD AUTO: 3.68 K/UL
NEUTROPHILS NFR BLD AUTO: 64.4 %
NONHDLC SERPL-MCNC: 88 MG/DL
PLATELET # BLD AUTO: 182 K/UL
POTASSIUM SERPL-SCNC: 4.4 MMOL/L
PROT SERPL-MCNC: 7.5 G/DL
RBC # BLD: 4.09 M/UL
RBC # FLD: 13.6 %
SODIUM SERPL-SCNC: 142 MMOL/L
TRIGL SERPL-MCNC: 98 MG/DL
TSH SERPL-ACNC: 1.72 UIU/ML
WBC # FLD AUTO: 5.7 K/UL

## 2021-07-21 ENCOUNTER — APPOINTMENT (OUTPATIENT)
Dept: CARDIOLOGY | Facility: CLINIC | Age: 86
End: 2021-07-21
Payer: MEDICARE

## 2021-07-21 ENCOUNTER — RX RENEWAL (OUTPATIENT)
Age: 86
End: 2021-07-21

## 2021-07-21 PROCEDURE — 99072 ADDL SUPL MATRL&STAF TM PHE: CPT

## 2021-07-21 PROCEDURE — 93306 TTE W/DOPPLER COMPLETE: CPT

## 2021-07-21 PROCEDURE — 93978 VASCULAR STUDY: CPT

## 2021-07-31 NOTE — PATIENT PROFILE ADULT - FALL HARM RISK
Chief Complaint   Patient presents with   • Follow-up       HISTORY OF PRESENT ILLNESS:  Stefania Esposito is a 28 year old  s/p diagnostic hysteroscopy with directed endometrial sampling, Abdominal myomectomy, Chromopertubation  on 6/15/21 for AUB-L who presents today for her postop check.    Today, states that she is doing overall well.  Pain controlled on PO meds.  Tolerating gen diet. +ambulation, +urination, +flatus/BM.  Incision has not been causing her any problems.  Denies increased pain, erythema, warmth, swelling or discharge.    Previously discussed her surgical findings.  Hysteroscopy revealed normal appearing uterine cavity without defectand normal appearing bilateral ostia. Endometrial sampling performed, pathology benign.  Uterus with anterior fundal approximately 3cm subserosal fibroid easily palpable and removed. Second fundal fibroid palpated immediately superior to prior fibroid and removed through the same incision. Slightly smaller, approximately 2cm. Dense adhesive disease adhering bilateral ovaries and left fallopian tube to the uterine body. Bilateral hydrosalpinx. Chromopertubation with free flow through the right fallopian tube. No flow seen from the left fallopian tube.      Patient reports that she has 2 menses since her procedure.  She continues to have abdominal cramping during her menses though it is less significant than prior to surgery.  She has had persistent spotting in the days leading up to her menses which is new for her.  Also reports vaginal irritation.  No discharge.  Has had intercourse since surgery.  Does report new soap.    ROS:  Denies fever, chills, nausea, vomiting, chest pain, shortness of breath, or dizziness.      Past Medical History:   Diagnosis Date   • Fibroid uterus    • RAD (reactive airway disease)        Past Surgical History:   Procedure Laterality Date   • Hysteroscopy  06/15/2021    Dr. Carrillo   • Myomectomy  06/15/2021    Abdominal  myomectomy, chromopertubation showing patent right tube occluded left tube       OB History    Para Term  AB Living   0 0 0 0 0 0   SAB TAB Ectopic Molar Multiple Live Births   0 0 0 0 0 0   Obstetric Comments   GYN: 10x30 x3-7. Currently on nothing for birth control.  Denies h/o abnormal Pap smears.  Last Pap smear 2020 Neg.  + saskia, chlam.  Has sex with men only.  Been in a monogamous relationship since 2018        Social History     Tobacco Use   • Smoking status: Current Some Day Smoker     Types: Cigars   • Smokeless tobacco: Never Used   • Tobacco comment: 2/week   Vaping Use   • Vaping Use: never used   Substance Use Topics   • Alcohol use: No   • Drug use: No       Family History   Problem Relation Age of Onset   • Hypertension Mother    • Diabetes Maternal Grandmother        Current Outpatient Medications   Medication Sig Dispense Refill   • ibuprofen (MOTRIN) 600 MG tablet Take 1 tablet by mouth every 6 hours as needed for Pain. 30 tablet 0   • acetaminophen (TYLENOL) 500 MG tablet Take 2 tablets by mouth every 8 hours. 30 tablet 0   • Multiple Vitamin (Multivitamin Adult) Tab Take 1 tablet by mouth daily.     • adapalene (Differin) 0.3 % gel Differin 0.3 % topical gel with pump   APPLY TOPICALLY EVERY DAY TO THE AFFECTED AREA(S) AFTER WASHING IN THE EVENING     • Prenatal MV & Min w/FA-DHA (Prenatal Adult Gummy/DHA/FA) 0.4-25 MG Chew Tab Chew 1 Dose by mouth daily. 90 tablet 3   • spironolactone (ALDACTONE) 100 MG tablet Take 100 mg by mouth daily.     • fluconazole (Diflucan) 150 MG tablet Take 1 tablet by mouth 1 time for 1 dose. 1 tablet 0   • clobetasol (TEMOVATE) 0.05 % ointment clobetasol 0.05 % topical ointment   APPLY TO THE AFFECTED AREA OF SCALP EVERY NIGHT AT BEDTIME     • docusate sodium-sennosides (SENOKOT S) 50-8.6 MG per tablet Take 2 tablets by mouth 2 times daily as needed for Constipation. 30 tablet 0   • oxyCODONE, IMM REL, (ROXICODONE) 5 MG immediate release  tablet Take 1 tablet by mouth every 4 hours as needed for Pain. 10 tablet 0   • Ferrous Sulfate (Iron) 325 (65 Fe) MG Tab Take 1 tablet by mouth every other day. 45 tablet 3   • tranexamic acid (LYSTEDA) 650 MG tablet Take 2 tablets by mouth 3 times daily. 30 tablet 11     No current facility-administered medications for this visit.       ALLERGIES:  Patient has no known allergies.    PHYSICAL EXAMINATION:  Visit Vitals  /70   Pulse 72   Ht 5' 6\" (1.676 m)   Wt 81.5 kg   LMP 07/07/2021   BMI 28.99 kg/m²       GEN:  No acute distress, adequate nutritional status.  EYES:  Extraocular muscles intact.  No conjunctival erythema or scleral icterus.    HEENT:  Normocephalic, atraumatic.  Moist mucous membranes.    RESPIRATORY:  Nonlabored breathing without use of accessory muscles.   ABDOMEN:  No masses on examination.  Soft, nondistended, nontender.    MUSCULOSKELETAL:  Normal stance and gait.  No clubbing, cyanosis or edema.  Muscle strength and sensation are normal bilaterally 5/5 in both upper and lower extremities.   NEUROLOGIC:  Cranial nerves 2-12 grossly intact.  SKIN:  No rashes or lesions noted.  Warm and dry to palpation.   PSYCHIATRIC:  Mood stable, affect congruent.  Alert and oriented x3.  Intact recent and remote history.  INCISION:  Clean/dry/intact.  No pain, erythema, warmth, swelling or discharge.  GENITOURINARY:  Inspection of external genitalia reveals normal mons pubis, pubic hair distribution, labia minora, majora and clitoris.   Urethra:  No masses, tenderness, or scarring.  Sterile speculum exam reveals pink and moist vaginal mucosa.  + scant old blood in vaginal vault.  + vaginal discharge.  Cervix is free from any lesions.  No cervical motion tenderness.    Uterus normal in size, contour.  No uterine tenderness.  No adnexal tenderness or masses palpated.    Anus free from lesions or hemorrhoids.     Wet Mount: Neg for trichomonas, clues cells. +yeast. Negative whiff test. (I personally  performed wet mount and reviewed the slides)        PATHOLOGY:  A.   Endometrium, sampling:  - Benign secretory phase endometrial tissue.  - Benign smooth muscle tissue.     B.   Uterine fibroids, myomectomy:  - Leiomyomas (x2).    ASSESSMENT/PLAN:   Stefania Esposito is a 28 year old  s/p diagnostic hysteroscopy with directed endometrial sampling, Abdominal myomectomy, Chromopertubation  on 6/15/21 for AUB-L who presents today for her postop check.  1. Postop check  -Appears to be healing well without evidence of separation or infection.  -does have some continue dysmenorrhea and prolonged spotting.  Will continue to monitor her symptoms and follow up if there is no improvement.    2. History of infertility  -patient reports she has been attempting pregnancy since 2017  -discuss that given findings on procedure, it will likely be very challenging for her to become pregnant spontaneously.  There was dye spillage from right fallopian tube during chromopertubation but there was no spillage from left indicating occlusion.  She does have diffuse adhesive disease, some of which we were able to dissect away.  She does have bilateral hydrosalpinx.  Given clean intrauterine cavity, she would likely have more success using IVF.    3.  Yeast vaginitis  -prescribed Diflucan  -gonorrhea, chlamydia, Trichomonas testing obtained  -swab 1 obtained given that wet mount was challenging to evaluate due to numerous RBCs during field.  Did see some yeast cells so will treat for that    4. Dispo: May return to work and all activities. F/u primary gyn in 1 year      Please note that 20 min was spent on this visit during today's calendar date which may have included: preparing for the visit, obtaining/reviewing history, performing medically appropriate examination/evaluation, counseling/educating the patient, ordering medications/tests/procedures if applicable, independently interpreting results if applicable, documenting clinical  information, care coordination.         age(85 years old or older)

## 2021-08-03 ENCOUNTER — APPOINTMENT (OUTPATIENT)
Dept: VASCULAR SURGERY | Facility: CLINIC | Age: 86
End: 2021-08-03
Payer: MEDICARE

## 2021-08-03 VITALS — HEART RATE: 67 BPM | DIASTOLIC BLOOD PRESSURE: 79 MMHG | SYSTOLIC BLOOD PRESSURE: 180 MMHG

## 2021-08-03 PROCEDURE — 99203 OFFICE O/P NEW LOW 30 MIN: CPT

## 2021-08-03 PROCEDURE — 99072 ADDL SUPL MATRL&STAF TM PHE: CPT

## 2021-08-03 NOTE — PHYSICAL EXAM
[JVD] : no jugular venous distention  [Normal Breath Sounds] : Normal breath sounds [Normal Rate and Rhythm] : normal rate and rhythm [2+] : left 2+ [Ankle Swelling (On Exam)] : not present [Varicose Veins Of Lower Extremities] : not present [] : not present [Abdomen Tenderness] : ~T ~M No abdominal tenderness [No Rash or Lesion] : No rash or lesion [Alert] : alert [Calm] : calm [de-identified] : appears well

## 2021-08-03 NOTE — HISTORY OF PRESENT ILLNESS
[FreeTextEntry1] : 88 yo female with a h/o CAD h/o stents presents with report of increase size of AAA. \par now 5.0 cm based on non-contrast abd ct scan\par no back or abd pain

## 2021-08-05 RX ORDER — LISINOPRIL 2.5 MG/1
2.5 TABLET ORAL DAILY
Qty: 90 | Refills: 3 | Status: DISCONTINUED | COMMUNITY
End: 2021-08-05

## 2021-08-05 RX ORDER — CALCIUM CARBONATE/VITAMIN D3 500-10/5ML
400 LIQUID (ML) ORAL
Qty: 90 | Refills: 3 | Status: DISCONTINUED | COMMUNITY
End: 2021-08-05

## 2021-08-07 ENCOUNTER — APPOINTMENT (OUTPATIENT)
Dept: CT IMAGING | Facility: CLINIC | Age: 86
End: 2021-08-07

## 2021-08-07 ENCOUNTER — OUTPATIENT (OUTPATIENT)
Dept: OUTPATIENT SERVICES | Facility: HOSPITAL | Age: 86
LOS: 1 days | End: 2021-08-07
Payer: MEDICARE

## 2021-08-07 DIAGNOSIS — Z98.49 CATARACT EXTRACTION STATUS, UNSPECIFIED EYE: Chronic | ICD-10-CM

## 2021-08-07 DIAGNOSIS — Z92.89 PERSONAL HISTORY OF OTHER MEDICAL TREATMENT: Chronic | ICD-10-CM

## 2021-08-07 DIAGNOSIS — I71.4 ABDOMINAL AORTIC ANEURYSM, WITHOUT RUPTURE: ICD-10-CM

## 2021-08-07 PROCEDURE — 74174 CTA ABD&PLVS W/CONTRAST: CPT

## 2021-08-07 PROCEDURE — 74174 CTA ABD&PLVS W/CONTRAST: CPT | Mod: 26

## 2021-08-13 ENCOUNTER — RX RENEWAL (OUTPATIENT)
Age: 86
End: 2021-08-13

## 2021-08-17 ENCOUNTER — RX RENEWAL (OUTPATIENT)
Age: 86
End: 2021-08-17

## 2021-08-17 RX ORDER — ASPIRIN 81 MG/1
81 TABLET, COATED ORAL
Qty: 90 | Refills: 3 | Status: ACTIVE | COMMUNITY
Start: 2020-08-03

## 2021-09-22 ENCOUNTER — RX RENEWAL (OUTPATIENT)
Age: 86
End: 2021-09-22

## 2021-11-24 RX ORDER — CLOPIDOGREL BISULFATE 75 MG/1
75 TABLET, FILM COATED ORAL DAILY
Qty: 90 | Refills: 3 | Status: DISCONTINUED | COMMUNITY
Start: 2019-05-07 | End: 2021-11-24

## 2021-11-30 ENCOUNTER — EMERGENCY (EMERGENCY)
Facility: HOSPITAL | Age: 86
LOS: 1 days | Discharge: HOME | End: 2021-11-30
Attending: EMERGENCY MEDICINE | Admitting: EMERGENCY MEDICINE
Payer: MEDICARE

## 2021-11-30 VITALS
HEIGHT: 63 IN | TEMPERATURE: 98 F | HEART RATE: 63 BPM | DIASTOLIC BLOOD PRESSURE: 85 MMHG | SYSTOLIC BLOOD PRESSURE: 174 MMHG | RESPIRATION RATE: 17 BRPM | WEIGHT: 162.04 LBS | OXYGEN SATURATION: 97 %

## 2021-11-30 DIAGNOSIS — Z98.49 CATARACT EXTRACTION STATUS, UNSPECIFIED EYE: Chronic | ICD-10-CM

## 2021-11-30 DIAGNOSIS — Z92.89 PERSONAL HISTORY OF OTHER MEDICAL TREATMENT: Chronic | ICD-10-CM

## 2021-11-30 LAB
ALBUMIN SERPL ELPH-MCNC: 3.6 G/DL — SIGNIFICANT CHANGE UP (ref 3.3–5)
ALP SERPL-CCNC: 79 U/L — SIGNIFICANT CHANGE UP (ref 40–120)
ALT FLD-CCNC: 17 U/L — SIGNIFICANT CHANGE UP (ref 12–78)
ANION GAP SERPL CALC-SCNC: 5 MMOL/L — SIGNIFICANT CHANGE UP (ref 5–17)
AST SERPL-CCNC: 13 U/L — LOW (ref 15–37)
BASOPHILS # BLD AUTO: 0.02 K/UL — SIGNIFICANT CHANGE UP (ref 0–0.2)
BASOPHILS NFR BLD AUTO: 0.2 % — SIGNIFICANT CHANGE UP (ref 0–2)
BILIRUB SERPL-MCNC: 0.3 MG/DL — SIGNIFICANT CHANGE UP (ref 0.2–1.2)
BUN SERPL-MCNC: 26 MG/DL — HIGH (ref 7–23)
CALCIUM SERPL-MCNC: 9.5 MG/DL — SIGNIFICANT CHANGE UP (ref 8.5–10.1)
CHLORIDE SERPL-SCNC: 106 MMOL/L — SIGNIFICANT CHANGE UP (ref 96–108)
CO2 SERPL-SCNC: 31 MMOL/L — SIGNIFICANT CHANGE UP (ref 22–31)
CREAT SERPL-MCNC: 0.93 MG/DL — SIGNIFICANT CHANGE UP (ref 0.5–1.3)
EOSINOPHIL # BLD AUTO: 0.1 K/UL — SIGNIFICANT CHANGE UP (ref 0–0.5)
EOSINOPHIL NFR BLD AUTO: 1.2 % — SIGNIFICANT CHANGE UP (ref 0–6)
GLUCOSE SERPL-MCNC: 95 MG/DL — SIGNIFICANT CHANGE UP (ref 70–99)
HCT VFR BLD CALC: 38.7 % — SIGNIFICANT CHANGE UP (ref 34.5–45)
HGB BLD-MCNC: 12.7 G/DL — SIGNIFICANT CHANGE UP (ref 11.5–15.5)
IMM GRANULOCYTES NFR BLD AUTO: 0.2 % — SIGNIFICANT CHANGE UP (ref 0–1.5)
INR BLD: 1.57 RATIO — HIGH (ref 0.88–1.16)
LACTATE SERPL-SCNC: 0.9 MMOL/L — SIGNIFICANT CHANGE UP (ref 0.7–2)
LIDOCAIN IGE QN: 169 U/L — SIGNIFICANT CHANGE UP (ref 73–393)
LYMPHOCYTES # BLD AUTO: 1.44 K/UL — SIGNIFICANT CHANGE UP (ref 1–3.3)
LYMPHOCYTES # BLD AUTO: 17.9 % — SIGNIFICANT CHANGE UP (ref 13–44)
MCHC RBC-ENTMCNC: 30.6 PG — SIGNIFICANT CHANGE UP (ref 27–34)
MCHC RBC-ENTMCNC: 32.8 GM/DL — SIGNIFICANT CHANGE UP (ref 32–36)
MCV RBC AUTO: 93.3 FL — SIGNIFICANT CHANGE UP (ref 80–100)
MONOCYTES # BLD AUTO: 0.66 K/UL — SIGNIFICANT CHANGE UP (ref 0–0.9)
MONOCYTES NFR BLD AUTO: 8.2 % — SIGNIFICANT CHANGE UP (ref 2–14)
NEUTROPHILS # BLD AUTO: 5.82 K/UL — SIGNIFICANT CHANGE UP (ref 1.8–7.4)
NEUTROPHILS NFR BLD AUTO: 72.3 % — SIGNIFICANT CHANGE UP (ref 43–77)
NRBC # BLD: 0 /100 WBCS — SIGNIFICANT CHANGE UP (ref 0–0)
PLATELET # BLD AUTO: 159 K/UL — SIGNIFICANT CHANGE UP (ref 150–400)
POTASSIUM SERPL-MCNC: 4.2 MMOL/L — SIGNIFICANT CHANGE UP (ref 3.5–5.3)
POTASSIUM SERPL-SCNC: 4.2 MMOL/L — SIGNIFICANT CHANGE UP (ref 3.5–5.3)
PROT SERPL-MCNC: 7.5 G/DL — SIGNIFICANT CHANGE UP (ref 6–8.3)
PROTHROM AB SERPL-ACNC: 18 SEC — HIGH (ref 10.6–13.6)
RBC # BLD: 4.15 M/UL — SIGNIFICANT CHANGE UP (ref 3.8–5.2)
RBC # FLD: 13.5 % — SIGNIFICANT CHANGE UP (ref 10.3–14.5)
SODIUM SERPL-SCNC: 142 MMOL/L — SIGNIFICANT CHANGE UP (ref 135–145)
WBC # BLD: 8.06 K/UL — SIGNIFICANT CHANGE UP (ref 3.8–10.5)
WBC # FLD AUTO: 8.06 K/UL — SIGNIFICANT CHANGE UP (ref 3.8–10.5)

## 2021-11-30 PROCEDURE — 99284 EMERGENCY DEPT VISIT MOD MDM: CPT | Mod: 25

## 2021-11-30 PROCEDURE — 80053 COMPREHEN METABOLIC PANEL: CPT

## 2021-11-30 PROCEDURE — 96360 HYDRATION IV INFUSION INIT: CPT

## 2021-11-30 PROCEDURE — 36415 COLL VENOUS BLD VENIPUNCTURE: CPT

## 2021-11-30 PROCEDURE — 76706 US ABDL AORTA SCREEN AAA: CPT

## 2021-11-30 PROCEDURE — 83605 ASSAY OF LACTIC ACID: CPT

## 2021-11-30 PROCEDURE — 83690 ASSAY OF LIPASE: CPT

## 2021-11-30 PROCEDURE — 85610 PROTHROMBIN TIME: CPT

## 2021-11-30 PROCEDURE — 85025 COMPLETE CBC W/AUTO DIFF WBC: CPT

## 2021-11-30 PROCEDURE — 76706 US ABDL AORTA SCREEN AAA: CPT | Mod: 26

## 2021-11-30 PROCEDURE — 99284 EMERGENCY DEPT VISIT MOD MDM: CPT

## 2021-11-30 RX ORDER — SODIUM CHLORIDE 9 MG/ML
1000 INJECTION INTRAMUSCULAR; INTRAVENOUS; SUBCUTANEOUS ONCE
Refills: 0 | Status: COMPLETED | OUTPATIENT
Start: 2021-11-30 | End: 2021-11-30

## 2021-11-30 RX ADMIN — SODIUM CHLORIDE 1000 MILLILITER(S): 9 INJECTION INTRAMUSCULAR; INTRAVENOUS; SUBCUTANEOUS at 23:35

## 2021-11-30 RX ADMIN — SODIUM CHLORIDE 1000 MILLILITER(S): 9 INJECTION INTRAMUSCULAR; INTRAVENOUS; SUBCUTANEOUS at 22:33

## 2021-11-30 NOTE — ED ADULT NURSE NOTE - CAS EDN DISCHARGE INTERVENTIONS
Stable Periph heme and IR fluid, advanced with disciform scar and central atrophy so no visual increase potential. IV discontinued, cath removed intact

## 2021-11-30 NOTE — ED ADULT TRIAGE NOTE - ESI TRIAGE ACUITY LEVEL, MLM
After Visit Summary   3/3/2017    Iva Valencia    MRN: [id number]           Patient Information     Date Of Birth          1977        Visit Information        Provider Department      3/3/2017 11:30 AM Jon Boss MD [facility] Neurology        Today's Diagnoses     Partial symptomatic epilepsy with complex partial seizures, not intractable, without status epilepticus (H)    -  1    Seizure disorder (H)           Follow-ups after your visit        Follow-up notes from your care team     Return in about 6 months (around 9/3/2017).      Your next 10 appointments already scheduled     Mar 03, 2017 12:15 PM CST   LAB with  LAB   [facility] Lab ([facility])    [address] [place] [address] [phone]           Patient must bring picture ID.  Patient should be prepared to give a urine specimen  Please do not eat 10-12 hours before your appointment if you are coming in fasting for labs on lipids, cholesterol, or glucose (sugar).  Pregnant women should follow their Care Team instructions. Water with medications is okay. Do not drink coffee or other fluids.   If you have concerns about taking  your medications, please ask at office or if scheduling via [employer], send a message by clicking on Secure Messaging, Message Your Care Team.            Sep 05, 2017 11:00 AM CDT   (Arrive by 10:45 AM)   Return Seizure with Jon Boss MD   [facility] Neurology ([facility])    [address] [address] [phone]              Future tests that were ordered for you today     Open Future Orders        Priority Expected Expires Ordered    Levetiracetam level Routine  3/3/2018 3/3/2017            Who to contact     Please call your clinic at [phone] to:    Ask questions about your health    Make or cancel appointments    Discuss your medicines    Learn about your test results    Speak to your  "doctor   If you have compliments or concerns about an experience at your clinic, or if you wish to file a complaint, please contact [facility] Patient Relations at [phone] or email us at [email or web address]         Additional Information About Your Visit        iTaggedhart Information     Gurubooks is an electronic gateway that provides easy, online access to your medical records. With Gurubooks, you can request a clinic appointment, read your test results, renew a prescription or communicate with your care team.     To sign up for Gurubooks visit the website at [email or web address]   You will be asked to enter the access code listed below, as well as some personal information. Please follow the directions to create your username and password.     Your access code is: [email or web address]  Expires: 2017  6:31 AM     Your access code will  in 90 days. If you need help or a new code, please contact your [facility] or call [phone] for assistance.        Care EveryWhere ID     This is your Care EveryWhere ID. This could be used by other organizations to access your [place] medical records  [id number]        Your Vitals Were     Pulse Respirations Height Pulse Oximetry Breastfeeding? BMI (Body Mass Index)    86 20 1.626 m (5' 4\") 99% No 30.21 kg/m2       Blood Pressure from Last 3 Encounters:   17 119/79   16 130/83   16 (!) 144/96    Weight from Last 3 Encounters:   17 79.8 kg (176 lb)   16 77.1 kg (170 lb)   16 77.1 kg (170 lb)                 Where to get your medicines      These medications were sent to [facility] [address] [facility] [address] [place] [address]  [address] [facility] [address], [facility] [address]     Phone:  [phone]     levETIRAcetam 500 MG tablet          Primary Care Provider Office Phone # Fax #    Theresa Hall [phone]       " [facility] [address] [place] [address]        Thank you!     Thank you for choosing [facility] NEUROLOG[facility]  for your care. Our goal is always to provide you with excellent care. Hearing back from our patients is one way we can continue to improve our services. Please take a few minutes to complete the written survey that you may receive in the mail after your visit with us. Thank you!             Your Updated Medication List - Protect others around you: Learn how to safely use, store and throw away your medicines at www.disposemymeds.org.          This list is accurate as of: 3/3/17 12:02 PM.  Always use your most recent med list.                   Brand Name Dispense Instructions for use    cetirizine 10 MG tablet    zyrTEC     Take 10 mg by mouth daily       CYMBALTA 60 MG EC capsule   Generic drug:  DULoxetine      Take 120 mg by mouth daily       diphenhydrAMINE 25 MG tablet    BENADRYL    30 tablet    Take 1-2 tablets up to every 6 hours as needed for facial movements.       fluticasone 50 MCG/ACT spray    FLONASE    1 Package    Spray 1-2 sprays into both nostrils daily as needed.       guaiFENesin-codeine 100-10 MG/5ML Soln solution    ROBITUSSIN AC     Take 1-2 tsp. by mouth At Bedtime       ibuprofen 200 MG tablet    ADVIL/MOTRIN     Take 1-2 tablets by mouth. Every 4 to 6 hours as needed.       IRON SUPPLEMENT PO      Take 65 mg by mouth daily (with breakfast)       levETIRAcetam 500 MG tablet    KEPPRA    360 tablet    Take 2 tablets (1,000 mg) by mouth 2 times daily       levothyroxine 50 MCG tablet    SYNTHROID    90 tablet    Take 1 tablet by mouth daily.       METRONIDAZOLE PO      Take 500 mg by mouth 2 times daily       Multi-vitamin Tabs tablet      Take 1 tablet by mouth daily       NEXIUM PO      Take by mouth 2 times daily       omega-3 fatty acids 1200 MG capsule      Take 1 capsule by mouth daily.       TORADOL IM      Inject 1 mL into the muscle as needed       TYLENOL 500 MG  tablet   Generic drug:  acetaminophen      Take 1-2 tablets by mouth every 6 hours as needed.       ZOFRAN 8 MG tablet   Generic drug:  ondansetron      Take 8 mg by mouth every 8 hours as needed for nausea          3

## 2021-11-30 NOTE — ED PROVIDER NOTE - CONDITION AT DISCHARGE:
[facility]                         (A Division of [address] [place] [address])                                                                                                                       Evelyne Solares MD  Work Cell #: [phone]( Mon-Fri, 7am-5pm)  If no call or text back within 30 minutes from me, please call office number. (Prefer text when possible)  Office #:     [phone]  [id number] [phone]   Office Fax: [phone]     Date of Admission: 5/4/2021   Date of Service:  5/20/2021  Reason for FU : left ankle OM, fever    Current Antimicrobials:    Prior Antimicrobials:    Zosyn 5/17 to date  ·    Zithromax 500mg IV- 4/14 to 4/20  · Vancomycin IV 4/14  until 4/19  · Zosyn 2.25gm IV Q6 4/14 to 4/15   · Meropenem IV 4/15-4/20  Unasyn 3 gm Q6 hour 4/20 to 4/22/21  Ertapenem 1gm IV q24 4/23 to date  -- reduced dose to 500 mg 5/5  --Vancomycin 5/6 to 5/12  --Meropenem 1 gm Q12 5/6 to date       Assessment: Rec/ Plan on ID issues I am following:   · Leucopenia- Mild and new  · Fever: Improved post I and D on 5/14  · Source suspected progressed left ankle infection/abscess/ skin necrosis  Post left foot excisional debridement of skin, SQ   tissue down to bone, x-fix placement- 5/14/21  OR cultures neg-5/14  Necrotic skin around distal left leg  ESR >140, CRP 15.3 -- 5/14    --Post angioplasty/stent  LLE-5/17  bcx neg- 5/5 and 5/7  ucx neg 5/5  · Left Infected DM Ulcer and acute OM and necrotic tissue, punched out heel ulcer with Mixed infection- MSSA +E.coli + anaerobes - 4/14/21  · Post I and D of left heel bone/abscess 4/17, OR cultures remain sterile  · Post I and D with graft to heel- 4/21    · Acute renal failure 2/2 urinary retention, caal in place.  Drug related IN in DDX but less likely       (urine eos negative): Resolved  · Left heel ankle fracture 2/2 fall: on this admission    · Anemia- HB 6.2  · Hematuria- off anticoagulant    Other Medical Issues followed and managed by primary and other services  · Anemia  · DM- Poorly controlled  · Low Vitamin D  · CAD with CHF/ NSTMI. Atrial flutter  · CKD  · Hyperlipidemia       Past ID issues: N/A  -History of COVID 19 infection 4/2021   Cont Zosyn- plan to complete his OM treatment with Zosyn  ( until June 1)    Wound care for the skin care-LLE- monitor closely. So far dry skin necrosis with one area of blister on distal medial side    Monitor WBC- doesn't seem sepsis related. Possible effect of drugs Vs will check folate/B12 level if contributing                              Condition: Hemodynamically stable. Fever better    Subjective: \" my sugar is high\"  Doesn't want to eat lunch as his sugars keep high. Advised to work with dietician, but skipping not a good idea    Denies LE pain/ fever/chills. Denies cough/SOB- remains on O2 supplement  Failed void trial and caal placed again- tells me the new bag was full when caal was placed       Review of System:  See above, other wise negative     Summary:    Javier Erickson is 67 y/o WM with PMH as listed below- with initial consult on 4/15/21. Last seen by me on 4/22/21 prior to discharge. He was set up at [facility] infusion center for daily ertapenem.     Yesterday, I was notified by RN that he fell, hurt his left foot, and his PICC was not working and they infused him via PIV and sent him to ED.     On arrival to ED, was noted to be in acute renal failure, and with new left ankle fracture.     Wound care note with the picture reviewed.     Pt denies F/C/R. Denies SOB. Feels weak, appetite and po intake has decreased past many days per wife.      Patient Active Problem List   Diagnosis Code    Atrial flutter ([facility]) I48.92    DM2 (diabetes mellitus, type 2) ([facility]) E11.9    CAD (coronary artery disease) I25.10    ELINOR (acute kidney injury) ([facility] N17.9    CKD (chronic kidney disease) stage 3, GFR 30-59 ml/min ([facility]) N18.30    Elevated brain natriuretic peptide (BNP) level R79.89    Diabetic ulcer of left foot ([facility]) E11.621, L97.529    COVID-19 virus infection U07.1    Acute osteomyelitis (HCC) M86.10    Acute hematogenous osteomyelitis of left foot ([facility]) M86.072    Cellulitis of left foot L03. 116    Diabetic foot ulcer with osteomyelitis ([facility] E11.621, E11.69, L97.509, M86.9    Ulcer of left heel, with necrosis of bone ([facility]) L97.424    Osteomyelitis ([facility] M86.9    Trimalleolar fracture of ankle, closed, left, initial encounter S82.852A    Displacement of peripherally inserted central catheter (PICC) ([facility] T82.524A       Current Facility-Administered Medications   Medication Dose Route Frequency    potassium chloride 10 mEq in 100 ml IVPB  10 mEq IntraVENous Q1H    glucagon (GLUCAGEN) injection    PRN    tamsulosin (FLOMAX) capsule 0.8 mg  0.8 mg Oral DAILY    diphenhydrAMINE (BENADRYL) capsule 25 mg  25 mg Oral Q6H PRN    0.9% sodium chloride infusion 250 mL  250 mL IntraVENous PRN    piperacillin-tazobactam (ZOSYN) 3.375 g in 0.9% sodium chloride (MBP/ADV) 100 mL MBP  3.375 g IntraVENous Q6H    fentaNYL citrate (PF) injection  mcg   mcg IntraVENous Rad Multiple    midazolam (VERSED) injection 0.5-2 mg  0.5-2 mg IntraVENous Rad Multiple    heparin (porcine) 1,000 unit/mL injection 1,000-10,000 Units  1,000-10,000 Units IntraVENous Rad Multiple    [Held by provider] clopidogreL (PLAVIX) tablet 75 mg  75 mg Oral DAILY    furosemide (LASIX) tablet 20 mg  20 mg Oral DAILY    pantoprazole (PROTONIX) tablet 40 mg  40 mg Oral ACB    0.9% sodium chloride infusion 250 mL  250 mL IntraVENous PRN    albuterol-ipratropium (DUO-NEB) 2.5 MG-0.5 MG/3 ML  3 mL Nebulization Q4H PRN    ferrous sulfate tablet 325 mg  1 Tablet Oral BID WITH MEALS    metoprolol succinate (TOPROL-XL) XL tablet 50 mg  50 mg Oral DAILY    heparin (porcine) 100 unit/mL injection 500 Units  500 Units InterCATHeter Q8H PRN    apixaban (ELIQUIS) tablet 2.5 mg  2.5 mg Oral BID  acetaminophen (TYLENOL) tablet 1,000 mg  1,000 mg Oral Q8H PRN    atorvastatin (LIPITOR) tablet 40 mg  40 mg Oral QHS    insulin glargine (LANTUS) injection 15 Units  15 Units SubCUTAneous DAILY    glucose chewable tablet 16 g  16 g Oral PRN    glucagon (GLUCAGEN) injection 1 mg  1 mg IntraMUSCular PRN    dextrose (D50W) injection syrg 25 g  50 mL IntraVENous PRN    insulin lispro (HUMALOG) injection   SubCUTAneous AC&HS       Objective:    Visit Vitals  BP (!) 134/46   Pulse 96   Temp 98.3 °F (36.8 °C)   Resp 17   Ht 5' 8\" (1.727 m)   Wt 106.2 kg (234 lb 3.2 oz)   SpO2 100%   BMI 35.61 kg/m²       Temp (24hrs), Av.4 °F (36.9 °C), Min:98.2 °F (36.8 °C), Max:98.6 °F (37 °C)    Right PICC - site looks ok    GEN: WD chronically sick looking , not in resp distress. HEENT: Unicteric. EOMI intact  CHEST: Non laboured breathing. CVS:RRR, no mur/gallop  ABD: Obese/soft. Non tender. EXT: Left LE is has X-fix in place  --lower leg necrotic skin- circumferential noted- same- has one large blister over distal medial site. No creps. Wound vac over heel, X-fix in place  Skin: Dry and intact. No rash  CNS: A, OX3. Moves UE and right LE ok. CN grossly ok.        Microbiology:    Blood culture: - 2 sets: NGSF    - 2 sets: NG  : 1 set: NGSF     Urine culture:  - urine culture:      Wounddrainage and OR tissueculture:  - gram stain: GNR and GPR        --wound culture+ s.aurues and E.coli + anaerobe   -- OR culture: NG  - OR aerobic/anaerobic culture: [facility]    Lab results:    Chemistry  Recent Labs     21  0415 21  0250   * 175*   * 143   K 3.1* 3.9    107   CO2 36* 34*   BUN 13 15   CREA 1.17 1.05   CA 7.0* 6.8*   AGAP 2* 2*   BUCR 11* 14       CBC w/ Diff  Recent Labs     21  0415 21  1130 21  0625   WBC 3.7*  --  4.1*   RBC 2.68*  --  2.56*   HGB 7.0* 7.5* 6.7*   HCT 23.4* 24.5* 22.2*     --  238   GRANS  --   --  74*   LYMPH  -- --  14*   EOS  --   --  2     Imagin/5- CXR     1. Right PICC line tip retracted from optimal/appropriate position, projecting  over the right subclavian vein. 2. Mild bibasilar atelectasis.      21  US renal  1. Considerable urinary bladder fluid distention (approximately 2.1 L) with  likely reactive pelviectasis.      > Results called to [address] [place] [address] as patient 2 likely benefit from  urinary bladder catheterization.     : right humerus  No acute osseous abnormality or malalignment involving the right humerus. : MRI of left foot:    No acute osseous abnormality or malalignment involving the right humerus. : CTA AP w/contrast  No evidence of contrast extravasation to suggest active gastrointestinal  hemorrhage.     Bilateral hydroureteronephrosis secondary to markedly thick-walled urinary  bladder containing hyperdense fluid, consistent with hemorrhage. Indwelling  Mccray catheter with incomplete bladder decompression. Findings concerning for  cystitis and hemorrhage possibly secondary to catheter placement.   Cannot  exclude underlying malignancy, cystoscopy is recommend upon resolution of acute  pathology.     Large right inguinal hernia containing nondilated distal ileum and right colon.     Trace retroperitoneal ascites.      :  CXR: Borderline heart size with mild bronchovascular prominence likely vascular  congestion but improved since the last study of 2021.         Satisfactory

## 2021-11-30 NOTE — ED PROVIDER NOTE - OBJECTIVE STATEMENT
88 y/o F with hx of AAA most recent 4.8 cm sent in by cardiology, Osvaldo Mcnamara, for US to evaluated abd pain x 1 day.  pt denies chest pain, SOB, fevers, nausea, vomiting.

## 2021-11-30 NOTE — ED PROVIDER NOTE - NSFOLLOWUPINSTRUCTIONS_ED_ALL_ED_FT
Nonruptured Abdominal Aortic Aneurysm    WHAT YOU NEED TO KNOW:    The aorta is a bulging or weak area in your abdominal aorta. Over time, the bulge may grow and is at risk for tearing or rupturing. The aorta is a large blood vessel that extends from your heart to your abdomen. The part of the aorta that extends into your abdomen is called your abdominal aorta. Your abdominal aorta brings blood to your stomach, pelvis, and legs. An AAA that ruptures is a life-threatening emergency.    DISCHARGE INSTRUCTIONS:    Call your local emergency number (911 in the [place]), or have someone else call if:   •You faint or lose consciousness.      •You cannot be woken.      Seek care immediately if: The following signs or symptoms may mean the AAA is at risk of rupturing:   •You have sudden sharp pain in your abdomen, groin, back, legs, or buttocks.      •You have nausea and are vomiting.      •You feel dizzy.      •You have stiffness or swelling in your abdomen, or a lump in your abdomen.      •You have numbness or tingling in your legs.      •Your skin is pale, sweaty, or clammy.      Call your doctor if:   •You have questions or concerns about your condition or care.          Medicines: You may need any of the following:   •Blood pressure medicine may be given to lower your blood pressure.      •Cholesterol medicine may be given to lower your cholesterol.      •Take your medicine as directed. Contact your healthcare provider if you think your medicine is not helping or if you have side effects. Tell him or her if you are allergic to any medicine. Keep a list of the medicines, vitamins, and herbs you take. Include the amounts, and when and why you take them. Bring the list or the pill bottles to follow-up visits. Carry your medicine list with you in case of an emergency.      Manage a nonruptured AAA: You can help prevent your AAA from growing or rupturing by doing the following:   •Do not smoke. Nicotine and other chemicals in cigarettes and cigars can increase your blood pressure. It can also damage your aorta and increase the size of your AAA. Ask your healthcare provider for information if you currently smoke and need help to quit. E-cigarettes or smokeless tobacco still contain nicotine. Talk to your healthcare provider before you use these products.      •Exercise as directed. Exercise can help control your blood pressure and cholesterol level. Ask your healthcare provider how much exercise you need each day and which exercises are best for you.  Walking for Exercise           •Follow the meal plan recommended by your healthcare provider. Talk to your dietitian about a heart-healthy or low-sodium eating plan. Meal plans will help you lower your cholesterol and blood pressure. They will also help you reach a healthy weight.             •Do not lift anything heavier than 10 pounds. Heavy lifting can increase pressure in your abdominal aorta. This can increase your risk for a ruptured AAA.      Follow up with your doctor as directed: You will need regular tests and follow-up visits to monitor the size of your AAA. Keep all appointments. Write down your questions so you remember to ask them during your visits.

## 2021-11-30 NOTE — ED PROVIDER NOTE - PATIENT PORTAL LINK FT
You can access the FollowMyHealth Patient Portal offered by Health system by registering at the following website: [email or web address]. By joining [facility]’s FollowMyHealth portal, you will also be able to view your health information using other applications (apps) compatible with our system.

## 2021-11-30 NOTE — ED ADULT NURSE NOTE - OBJECTIVE STATEMENT
Received Pt awake and alert, c/o mid abdominal pain near belly button that started today, Hx AAA so wants to be checked.

## 2021-12-01 VITALS
TEMPERATURE: 98 F | OXYGEN SATURATION: 97 % | HEART RATE: 68 BPM | SYSTOLIC BLOOD PRESSURE: 148 MMHG | RESPIRATION RATE: 18 BRPM | DIASTOLIC BLOOD PRESSURE: 76 MMHG

## 2021-12-28 ENCOUNTER — RX RENEWAL (OUTPATIENT)
Age: 86
End: 2021-12-28

## 2021-12-29 ENCOUNTER — RX RENEWAL (OUTPATIENT)
Age: 86
End: 2021-12-29

## 2022-01-13 ENCOUNTER — RX RENEWAL (OUTPATIENT)
Age: 87
End: 2022-01-13

## 2022-01-13 ENCOUNTER — APPOINTMENT (OUTPATIENT)
Dept: CARDIOLOGY | Facility: CLINIC | Age: 87
End: 2022-01-13
Payer: MEDICARE

## 2022-01-13 ENCOUNTER — NON-APPOINTMENT (OUTPATIENT)
Age: 87
End: 2022-01-13

## 2022-01-13 VITALS
SYSTOLIC BLOOD PRESSURE: 166 MMHG | HEIGHT: 63 IN | HEART RATE: 70 BPM | BODY MASS INDEX: 28 KG/M2 | WEIGHT: 158 LBS | DIASTOLIC BLOOD PRESSURE: 83 MMHG | OXYGEN SATURATION: 97 %

## 2022-01-13 LAB
25(OH)D3 SERPL-MCNC: 98.5 NG/ML
ALBUMIN SERPL ELPH-MCNC: 4.6 G/DL
ALP BLD-CCNC: 94 U/L
ALT SERPL-CCNC: 12 U/L
ANION GAP SERPL CALC-SCNC: 14 MMOL/L
AST SERPL-CCNC: 19 U/L
BASOPHILS # BLD AUTO: 0.01 K/UL
BASOPHILS NFR BLD AUTO: 0.2 %
BILIRUB SERPL-MCNC: 0.3 MG/DL
BUN SERPL-MCNC: 26 MG/DL
CALCIUM SERPL-MCNC: 9.6 MG/DL
CHLORIDE SERPL-SCNC: 105 MMOL/L
CHOLEST SERPL-MCNC: 151 MG/DL
CO2 SERPL-SCNC: 25 MMOL/L
CREAT SERPL-MCNC: 0.84 MG/DL
EOSINOPHIL # BLD AUTO: 0.12 K/UL
EOSINOPHIL NFR BLD AUTO: 1.8 %
ESTIMATED AVERAGE GLUCOSE: 146 MG/DL
FOLATE SERPL-MCNC: >20 NG/ML
GLUCOSE SERPL-MCNC: 120 MG/DL
HBA1C MFR BLD HPLC: 6.7 %
HCT VFR BLD CALC: 39.9 %
HDLC SERPL-MCNC: 65 MG/DL
HGB BLD-MCNC: 12.8 G/DL
IMM GRANULOCYTES NFR BLD AUTO: 0.3 %
LDLC SERPL CALC-MCNC: 67 MG/DL
LYMPHOCYTES # BLD AUTO: 1.26 K/UL
LYMPHOCYTES NFR BLD AUTO: 19.3 %
MAN DIFF?: NORMAL
MCHC RBC-ENTMCNC: 30.7 PG
MCHC RBC-ENTMCNC: 32.1 GM/DL
MCV RBC AUTO: 95.7 FL
MONOCYTES # BLD AUTO: 0.5 K/UL
MONOCYTES NFR BLD AUTO: 7.7 %
NEUTROPHILS # BLD AUTO: 4.61 K/UL
NEUTROPHILS NFR BLD AUTO: 70.7 %
NONHDLC SERPL-MCNC: 87 MG/DL
PLATELET # BLD AUTO: 174 K/UL
POTASSIUM SERPL-SCNC: 4.7 MMOL/L
PROT SERPL-MCNC: 6.9 G/DL
RBC # BLD: 4.17 M/UL
RBC # FLD: 13.2 %
SODIUM SERPL-SCNC: 144 MMOL/L
TRIGL SERPL-MCNC: 96 MG/DL
TSH SERPL-ACNC: 1.59 UIU/ML
VIT B12 SERPL-MCNC: 1485 PG/ML
WBC # FLD AUTO: 6.52 K/UL

## 2022-01-13 PROCEDURE — 93000 ELECTROCARDIOGRAM COMPLETE: CPT

## 2022-01-13 PROCEDURE — 99214 OFFICE O/P EST MOD 30 MIN: CPT

## 2022-01-13 PROCEDURE — 99072 ADDL SUPL MATRL&STAF TM PHE: CPT

## 2022-01-13 NOTE — HISTORY OF PRESENT ILLNESS
[FreeTextEntry1] : Adry presented to the office today for a followup evaluation. She was last seen in the office in July.\par \par She is now 87 years old, with a history of bladder cancer. She has a history of  hypertension, with meds discontinued when her pressure dropped. In May of 2016, she had an episode of syncope.  While hospitalized, she was observed to have nonsustained ventricular tachycardia. She was transferred for further evaluation. Nuclear stress testing and echocardiography were unremarkable. A loop recorder was implanted, and more recently explanted. She has a 4.0 cm AAA, which has been followed. She has had a myocardial infarction, for which she had PCI of the circumflex and the right coronary artery. \par \par She presented in September with chest discomfort.   She described the symptoms as being similar but not identical to the symptoms which she was experiencing with her myocardial infarction.  Nuclear stress testing revealed fixed inferior and inferolateral defects, suggestive of infarct, with an ejection fraction of 45%. We decided to try to medically manage. Unfortunately, at the end of  she presented to the hospital with unstable angina. Cardiac catheterization revealed a severe stenosis distal within a small caliber obtuse marginal, which does serve a relatively significant myocardial territory. It was felt most appropriate to try medical therapy, with PCI reserved only for failure of medical therapy.\par \par In early , she reported chest discomfort suspicious for angina. She was referred to the emergency room, and had PCI of the circumflex. Shortly afterward her   after a long illness.  She was seen in the ER with atypical chest pain after that and was discharged after several sets of enzymes.\par \par At the time of the last visit, she was feeling well.  Based on an ultrasound and a noncontrast CT, we felt that her aorta had increased significantly in size, in the range of 5 cm.  I referred her to vascular surgery.  A contrast CT revealed that the aorta was only 4.6 cm.\par \par She has been feeling well from a cardiovascular perspective over the last 6 months or so.  She does not report any chest discomfort or shortness of breath.  She is been compliant with all of her medications.  Her blood pressure at home has been excellent.  She has not had recent blood work.  She had a great grandson a couple of months ago, and he is doing very well.

## 2022-01-24 ENCOUNTER — RX RENEWAL (OUTPATIENT)
Age: 87
End: 2022-01-24

## 2022-01-24 RX ORDER — NITROGLYCERIN 0.4 MG/1
0.4 TABLET SUBLINGUAL
Qty: 25 | Refills: 3 | Status: ACTIVE | COMMUNITY
Start: 2019-12-09

## 2022-01-25 ENCOUNTER — RX RENEWAL (OUTPATIENT)
Age: 87
End: 2022-01-25

## 2022-02-01 ENCOUNTER — APPOINTMENT (OUTPATIENT)
Dept: VASCULAR SURGERY | Facility: CLINIC | Age: 87
End: 2022-02-01
Payer: MEDICARE

## 2022-02-01 VITALS — HEART RATE: 75 BPM | SYSTOLIC BLOOD PRESSURE: 155 MMHG | DIASTOLIC BLOOD PRESSURE: 74 MMHG

## 2022-02-01 PROCEDURE — 99072 ADDL SUPL MATRL&STAF TM PHE: CPT

## 2022-02-01 PROCEDURE — 99213 OFFICE O/P EST LOW 20 MIN: CPT

## 2022-02-01 RX ORDER — CAMPHOR 0.45 %
25 GEL (GRAM) TOPICAL
Qty: 2 | Refills: 0 | Status: ACTIVE | COMMUNITY
Start: 2022-02-01 | End: 1900-01-01

## 2022-02-01 RX ORDER — PREDNISONE 20 MG/1
20 TABLET ORAL
Qty: 6 | Refills: 0 | Status: ACTIVE | COMMUNITY
Start: 2022-02-01 | End: 1900-01-01

## 2022-02-01 NOTE — ASSESSMENT
[FreeTextEntry1] : 88 yo female with a history of aaa presents for follow up. \par \par duplex shows aaa measuring 4.9 \par \par will obtain a cta of the abdomin and pelvis for further evaluation fo aaa \par pt to follow up in 3 weeks with duplex of b/l pop

## 2022-02-01 NOTE — HISTORY OF PRESENT ILLNESS
[FreeTextEntry1] : 88 yo female with a history of aaa presents for follow up.  pt states that went to the ed after experiencing some abdominal pain, duplex was completed and showed 4.9 cm infrarenal aortic artery aneursym.  pt denies any history of recurrent abdominal or tearing beak pain.  \par

## 2022-02-01 NOTE — PHYSICAL EXAM
[JVD] : no jugular venous distention  [2+] : left 2+ [Ankle Swelling (On Exam)] : not present [Varicose Veins Of Lower Extremities] : not present [] : not present [Abdomen Masses] : No abdominal masses [Abdomen Tenderness] : ~T ~M No abdominal tenderness [No Rash or Lesion] : No rash or lesion [Skin Ulcer] : no ulcer [Alert] : alert [Calm] : calm [de-identified] : appears well

## 2022-02-12 ENCOUNTER — APPOINTMENT (OUTPATIENT)
Dept: CT IMAGING | Facility: CLINIC | Age: 87
End: 2022-02-12
Payer: MEDICARE

## 2022-02-12 ENCOUNTER — OUTPATIENT (OUTPATIENT)
Dept: OUTPATIENT SERVICES | Facility: HOSPITAL | Age: 87
LOS: 1 days | End: 2022-02-12
Payer: MEDICARE

## 2022-02-12 DIAGNOSIS — Z92.89 PERSONAL HISTORY OF OTHER MEDICAL TREATMENT: Chronic | ICD-10-CM

## 2022-02-12 DIAGNOSIS — Z98.49 CATARACT EXTRACTION STATUS, UNSPECIFIED EYE: Chronic | ICD-10-CM

## 2022-02-12 DIAGNOSIS — I71.4 ABDOMINAL AORTIC ANEURYSM, WITHOUT RUPTURE: ICD-10-CM

## 2022-02-12 PROCEDURE — 75635 CT ANGIO ABDOMINAL ARTERIES: CPT | Mod: 26

## 2022-02-12 PROCEDURE — 75635 CT ANGIO ABDOMINAL ARTERIES: CPT

## 2022-02-24 ENCOUNTER — APPOINTMENT (OUTPATIENT)
Dept: VASCULAR SURGERY | Facility: CLINIC | Age: 87
End: 2022-02-24
Payer: MEDICARE

## 2022-02-24 VITALS
HEIGHT: 63 IN | DIASTOLIC BLOOD PRESSURE: 78 MMHG | SYSTOLIC BLOOD PRESSURE: 163 MMHG | WEIGHT: 158 LBS | BODY MASS INDEX: 28 KG/M2 | HEART RATE: 68 BPM

## 2022-02-24 PROCEDURE — 99213 OFFICE O/P EST LOW 20 MIN: CPT

## 2022-02-24 PROCEDURE — 99072 ADDL SUPL MATRL&STAF TM PHE: CPT

## 2022-02-24 PROCEDURE — 93925 LOWER EXTREMITY STUDY: CPT

## 2022-05-09 ENCOUNTER — RX RENEWAL (OUTPATIENT)
Age: 87
End: 2022-05-09

## 2022-05-19 ENCOUNTER — NON-APPOINTMENT (OUTPATIENT)
Age: 87
End: 2022-05-19

## 2022-05-23 NOTE — PHYSICAL EXAM
[JVD] : no jugular venous distention  [2+] : left 2+ [Ankle Swelling (On Exam)] : not present [Varicose Veins Of Lower Extremities] : not present [] : not present [Abdomen Masses] : No abdominal masses [Abdomen Tenderness] : ~T ~M No abdominal tenderness [No Rash or Lesion] : No rash or lesion [Skin Ulcer] : no ulcer [Alert] : alert [Calm] : calm [de-identified] : appears well

## 2022-05-23 NOTE — ASSESSMENT
[FreeTextEntry1] : 86 yo female with a history of aaa presents for follow up. \par \par duplex shows aaa measuring 4.9 \par \par CTA shows a 4.6 cm abdominal aortic aneurysm.  This is unchanged.  No need for intervention.  Patient to follow-up in 6 months.

## 2022-07-05 ENCOUNTER — NON-APPOINTMENT (OUTPATIENT)
Age: 87
End: 2022-07-05

## 2022-07-05 ENCOUNTER — APPOINTMENT (OUTPATIENT)
Dept: CARDIOLOGY | Facility: CLINIC | Age: 87
End: 2022-07-05

## 2022-07-05 VITALS
OXYGEN SATURATION: 96 % | BODY MASS INDEX: 28 KG/M2 | HEIGHT: 63 IN | DIASTOLIC BLOOD PRESSURE: 67 MMHG | SYSTOLIC BLOOD PRESSURE: 164 MMHG | WEIGHT: 158 LBS | HEART RATE: 81 BPM

## 2022-07-05 PROCEDURE — 93000 ELECTROCARDIOGRAM COMPLETE: CPT

## 2022-07-05 PROCEDURE — 99214 OFFICE O/P EST MOD 30 MIN: CPT

## 2022-07-05 PROCEDURE — 99072 ADDL SUPL MATRL&STAF TM PHE: CPT

## 2022-07-05 NOTE — HISTORY OF PRESENT ILLNESS
[FreeTextEntry1] : Adry presented to the office today for a followup evaluation. She was last seen in the office in January\par \par She is now 88 years old, with a history of bladder cancer. She has a history of  hypertension, with meds discontinued when her pressure dropped. In May of 2016, she had an episode of syncope.  While hospitalized, she was observed to have nonsustained ventricular tachycardia. She was transferred for further evaluation. Nuclear stress testing and echocardiography were unremarkable. A loop recorder was implanted, and more recently explanted. She has a 4.0 cm AAA, which has been followed. She has had a myocardial infarction, for which she had PCI of the circumflex and the right coronary artery. \par \par She presented in September with chest discomfort.   She described the symptoms as being similar but not identical to the symptoms which she was experiencing with her myocardial infarction.  Nuclear stress testing revealed fixed inferior and inferolateral defects, suggestive of infarct, with an ejection fraction of 45%. We decided to try to medically manage. Unfortunately, at the end of  she presented to the hospital with unstable angina. Cardiac catheterization revealed a severe stenosis distal within a small caliber obtuse marginal, which does serve a relatively significant myocardial territory. It was felt most appropriate to try medical therapy, with PCI reserved only for failure of medical therapy.\par \par In early , she reported chest discomfort suspicious for angina. She was referred to the emergency room, and had PCI of the circumflex. Shortly afterward her   after a long illness.  She was seen in the ER with atypical chest pain after that and was discharged after several sets of enzymes.\par \par At the time of visit in , she was feeling well.  Based on an ultrasound and a noncontrast CT, we felt that her aorta had increased significantly in size, in the range of 5 cm.  I referred her to vascular surgery.  A contrast CT revealed that the aorta was only 4.6 cm.\par \par I saw her again in , and she was feeling well.  She had a great grandson recently at that time, and he was doing very well.\par \par She has been feeling well from a cardiovascular perspective over the last 6 months or so.  She does not report any chest discomfort or shortness of breath.  She is been compliant with all of her medications.  Her blood pressure at home has been excellent.  She has not had recent blood work.

## 2022-07-05 NOTE — DISCUSSION/SUMMARY
[FreeTextEntry1] : Adry is doing well from a cardiovascular perspective. She has no recurrent symptoms of angina. I will make no changes to her medications at this time.\par \par Her blood pressure was quite elevated here, but this has been the case previously. We will rely upon her blood pressures from home, which have been excellent.\par  \par Her abdominal aorta dimensions are somewhere between 4.6 and 5 cm by CT.  The CT with contrast revealed the smaller dimensions.  We will rely upon that measurement for now.  She has an appointment to see the vascular surgeon in August.  Hopefully she does not need surgery at this time.\par \par I have suggested a precautionary carotid ultrasound, as well as repetition of her echocardiogram, in light of her mild ischemic cardiomyopathy.  She will schedule these, as well as blood work.  I will be in contact with her to discuss the results.\par \par If all is well, she will see me in 6 months.

## 2022-07-05 NOTE — PHYSICAL EXAM
[Normal Appearance] : normal appearance [General Appearance - In No Acute Distress] : no acute distress [Normal Conjunctiva] : the conjunctiva exhibited no abnormalities [Normal Oral Mucosa] : normal oral mucosa [Normal Jugular Venous V Waves Present] : normal jugular venous V waves present [Respiration, Rhythm And Depth] : normal respiratory rhythm and effort [Exaggerated Use Of Accessory Muscles For Inspiration] : no accessory muscle use [Auscultation Breath Sounds / Voice Sounds] : lungs were clear to auscultation bilaterally [Abdomen Soft] : soft [Abdomen Tenderness] : non-tender [Abdomen Mass (___ Cm)] : no abdominal mass palpated [Abnormal Walk] : normal gait [Nail Clubbing] : no clubbing of the fingernails [Cyanosis, Localized] : no localized cyanosis [Petechial Hemorrhages (___cm)] : no petechial hemorrhages [Skin Color & Pigmentation] : normal skin color and pigmentation [] : no rash [No Venous Stasis] : no venous stasis [Skin Lesions] : no skin lesions [Oriented To Time, Place, And Person] : oriented to person, place, and time [Affect] : the affect was normal [Mood] : the mood was normal [No Anxiety] : not feeling anxious [Not Palpable] : not palpable [No Precordial Heave] : no precordial heave was noted [Normal Rate] : normal [Rhythm Regular] : regular [Normal S1] : normal S1 [Normal S2] : normal S2 [No Gallop] : no gallop heard [No Murmur] : no murmurs heard [Right Carotid Bruit] : right carotid bruit heard [Left Carotid Bruit] : left carotid bruit heard [1+] : left 1+ [___ +] : bilateral [unfilled]U+ pretibial pitting edema [FreeTextEntry1] : No JVD [Bruit] : no bruit heard

## 2022-07-14 ENCOUNTER — MED ADMIN CHARGE (OUTPATIENT)
Age: 87
End: 2022-07-14

## 2022-07-14 ENCOUNTER — APPOINTMENT (OUTPATIENT)
Dept: CARDIOLOGY | Facility: CLINIC | Age: 87
End: 2022-07-14

## 2022-07-14 LAB
ALBUMIN SERPL ELPH-MCNC: 4.4 G/DL
ALP BLD-CCNC: 79 U/L
ALT SERPL-CCNC: 11 U/L
ANION GAP SERPL CALC-SCNC: 12 MMOL/L
AST SERPL-CCNC: 20 U/L
BASOPHILS # BLD AUTO: 0.01 K/UL
BASOPHILS NFR BLD AUTO: 0.2 %
BILIRUB SERPL-MCNC: 0.3 MG/DL
BUN SERPL-MCNC: 25 MG/DL
CALCIUM SERPL-MCNC: 9.5 MG/DL
CHLORIDE SERPL-SCNC: 108 MMOL/L
CHOLEST SERPL-MCNC: 161 MG/DL
CO2 SERPL-SCNC: 24 MMOL/L
CREAT SERPL-MCNC: 0.88 MG/DL
EGFR: 63 ML/MIN/1.73M2
EOSINOPHIL # BLD AUTO: 0.13 K/UL
EOSINOPHIL NFR BLD AUTO: 2.2 %
GLUCOSE SERPL-MCNC: 129 MG/DL
HCT VFR BLD CALC: 39.7 %
HDLC SERPL-MCNC: 65 MG/DL
HGB BLD-MCNC: 12.7 G/DL
IMM GRANULOCYTES NFR BLD AUTO: 0.3 %
LDLC SERPL CALC-MCNC: 74 MG/DL
LYMPHOCYTES # BLD AUTO: 1.38 K/UL
LYMPHOCYTES NFR BLD AUTO: 23.4 %
MAN DIFF?: NORMAL
MCHC RBC-ENTMCNC: 30.3 PG
MCHC RBC-ENTMCNC: 32 GM/DL
MCV RBC AUTO: 94.7 FL
MONOCYTES # BLD AUTO: 0.58 K/UL
MONOCYTES NFR BLD AUTO: 9.8 %
NEUTROPHILS # BLD AUTO: 3.79 K/UL
NEUTROPHILS NFR BLD AUTO: 64.1 %
NONHDLC SERPL-MCNC: 96 MG/DL
PLATELET # BLD AUTO: 173 K/UL
POTASSIUM SERPL-SCNC: 4.3 MMOL/L
PROT SERPL-MCNC: 6.8 G/DL
RBC # BLD: 4.19 M/UL
RBC # FLD: 13.7 %
SODIUM SERPL-SCNC: 144 MMOL/L
TRIGL SERPL-MCNC: 110 MG/DL
TSH SERPL-ACNC: 1.65 UIU/ML
WBC # FLD AUTO: 5.91 K/UL

## 2022-07-14 PROCEDURE — 99072 ADDL SUPL MATRL&STAF TM PHE: CPT

## 2022-07-14 PROCEDURE — 93306 TTE W/DOPPLER COMPLETE: CPT

## 2022-07-14 PROCEDURE — 93880 EXTRACRANIAL BILAT STUDY: CPT

## 2022-07-14 RX ORDER — PERFLUTREN 6.52 MG/ML
6.52 INJECTION, SUSPENSION INTRAVENOUS
Qty: 1 | Refills: 0 | Status: COMPLETED | OUTPATIENT
Start: 2022-07-14

## 2022-07-14 RX ADMIN — PERFLUTREN MG/ML: 6.52 INJECTION, SUSPENSION INTRAVENOUS at 00:00

## 2022-07-18 ENCOUNTER — RX RENEWAL (OUTPATIENT)
Age: 87
End: 2022-07-18

## 2022-07-18 LAB
ESTIMATED AVERAGE GLUCOSE: 146 MG/DL
HBA1C MFR BLD HPLC: 6.7 %

## 2022-08-25 ENCOUNTER — APPOINTMENT (OUTPATIENT)
Dept: VASCULAR SURGERY | Facility: CLINIC | Age: 87
End: 2022-08-25

## 2022-08-25 PROCEDURE — 99072 ADDL SUPL MATRL&STAF TM PHE: CPT

## 2022-08-25 PROCEDURE — 99213 OFFICE O/P EST LOW 20 MIN: CPT

## 2022-08-25 PROCEDURE — 93978 VASCULAR STUDY: CPT

## 2022-08-25 NOTE — HISTORY OF PRESENT ILLNESS
[FreeTextEntry1] : 88-year-old female with a history of AAA presents for follow up.  Patient without any complaints at this time denies any abdominal or back pain.  Patient denies any difficulty ambulating or pain in the feet\par

## 2022-08-25 NOTE — PHYSICAL EXAM
[No Rash or Lesion] : No rash or lesion [Alert] : alert [Calm] : calm [JVD] : no jugular venous distention  [Normal Breath Sounds] : Normal breath sounds [Normal Rate and Rhythm] : normal rate and rhythm [2+] : left 2+ [Ankle Swelling (On Exam)] : not present [Varicose Veins Of Lower Extremities] : not present [] : not present [Abdomen Tenderness] : ~T ~M No abdominal tenderness [de-identified] : Appears well

## 2022-08-25 NOTE — ASSESSMENT
[FreeTextEntry1] : 88-year-old female with a history of aaa presents for follow up\par \par Aortic duplex shows abdominal aortic aneurysm measuring 5.1 cm (previously measuring at 4.9 cm)\par \par We will continue to monitor closely patient to follow-up in 6 months with a repeat duplex

## 2022-11-29 NOTE — ED ADULT NURSE NOTE - NS ED NURSE LEVEL OF CONSCIOUSNESS ORIENTATION
Occupational Therapy  Facility/Department: [facility] THE [facility] MED SURG  Daily Treatment Note  NAME: Andreea Kelly  : 1935  MRN: [id number]    Date of Service: 2022    Discharge Recommendations:  Continue to assess pending progress, Long Term Care with OT         Patient Diagnosis(es): The primary encounter diagnosis was Urinary tract infection with hematuria, site unspecified. A diagnosis of Vaginal bleeding was also pertinent to this visit. Assessment    Activity Tolerance: Treatment limited secondary to decreased cognition  Discharge Recommendations: Continue to assess pending progress; Long Term Care with OT      Plan   Occupational Therapy Plan  Times Per Week: 7x/wk  Times Per Day: Once a day  Current Treatment Recommendations: Strengthening; Functional mobility training; Safety education & training;Self-Care / ADL     Restrictions  Restrictions/Precautions  Restrictions/Precautions: Fall Risk;General Precautions    Subjective   Subjective  Subjective: Pt sitting up in bedside chair upon arrival. Pt perseverating on getting dressed and pt diffcult to reorient. Pain: Pt had no complaints of pain. Objective    Vitals     Transfer Training  Toilet Transfer: Moderate assistance;Assist X2 (Mod A x 2 from chair to bedside commode.)     ADL  Toileting: Maximum assistance  OT Exercises  Exercise Treatment: Pt tolerated AAROM x 4 planes x 10 reps x 1 set to increase UE strength and endurance in order to ease completion of ADL tasks. Pt required RBs as needed secondary to fatigue. Safety Devices  Type of Devices: Call light within reach; Left in chair;Chair alarm in place     Patient Education  Education Given To: Patient  Education Provided: Plan of Care;Transfer Training  Education Method: Demonstration;Verbal  Barriers to Learning: Cognition  Education Outcome: Continued education needed    Goals  Short Term Goals  Time Frame for Short Term Goals: 20 visits  Short Term Goal 1: Pt. will tolerate 15 mins of BUE ther ex/ther act to increase overall strength/activity tolerance for functional tasks. Short Term Goal 2: Pt. will engage in simple ADL tasks with cueing PRN to increase (I) with self cares. Short Term Goal 3: Pt. will complete transfers to bedside commode or toilet/grab bars with Min A with reduced risk for falls.        Therapy Time   Individual Concurrent Group Co-treatment   Time In [facility] 58         Time Out 0944         Minutes 26                 LOUISE Banks/ERICA Oriented - self; Oriented - place; Oriented - time

## 2023-01-09 ENCOUNTER — APPOINTMENT (OUTPATIENT)
Dept: CARDIOLOGY | Facility: CLINIC | Age: 88
End: 2023-01-09
Payer: MEDICARE

## 2023-01-09 ENCOUNTER — NON-APPOINTMENT (OUTPATIENT)
Age: 88
End: 2023-01-09

## 2023-01-09 VITALS
BODY MASS INDEX: 27.46 KG/M2 | SYSTOLIC BLOOD PRESSURE: 187 MMHG | OXYGEN SATURATION: 98 % | HEART RATE: 44 BPM | WEIGHT: 155 LBS | HEIGHT: 63 IN | DIASTOLIC BLOOD PRESSURE: 76 MMHG

## 2023-01-09 LAB
ALBUMIN SERPL ELPH-MCNC: 4.5 G/DL
ALP BLD-CCNC: 87 U/L
ALT SERPL-CCNC: 10 U/L
ANION GAP SERPL CALC-SCNC: 11 MMOL/L
AST SERPL-CCNC: 16 U/L
BASOPHILS # BLD AUTO: 0.01 K/UL
BASOPHILS NFR BLD AUTO: 0.2 %
BILIRUB SERPL-MCNC: 0.3 MG/DL
BUN SERPL-MCNC: 24 MG/DL
CALCIUM SERPL-MCNC: 9.6 MG/DL
CHLORIDE SERPL-SCNC: 100 MMOL/L
CHOLEST SERPL-MCNC: 156 MG/DL
CO2 SERPL-SCNC: 30 MMOL/L
CREAT SERPL-MCNC: 0.83 MG/DL
EGFR: 68 ML/MIN/1.73M2
EOSINOPHIL # BLD AUTO: 0.11 K/UL
EOSINOPHIL NFR BLD AUTO: 1.7 %
ESTIMATED AVERAGE GLUCOSE: 134 MG/DL
GLUCOSE SERPL-MCNC: 121 MG/DL
HBA1C MFR BLD HPLC: 6.3 %
HCT VFR BLD CALC: 41.2 %
HDLC SERPL-MCNC: 72 MG/DL
HGB BLD-MCNC: 13.5 G/DL
IMM GRANULOCYTES NFR BLD AUTO: 0.3 %
LDLC SERPL CALC-MCNC: 67 MG/DL
LYMPHOCYTES # BLD AUTO: 1.62 K/UL
LYMPHOCYTES NFR BLD AUTO: 25.2 %
MAN DIFF?: NORMAL
MCHC RBC-ENTMCNC: 30.9 PG
MCHC RBC-ENTMCNC: 32.8 GM/DL
MCV RBC AUTO: 94.3 FL
MONOCYTES # BLD AUTO: 0.56 K/UL
MONOCYTES NFR BLD AUTO: 8.7 %
NEUTROPHILS # BLD AUTO: 4.12 K/UL
NEUTROPHILS NFR BLD AUTO: 63.9 %
NONHDLC SERPL-MCNC: 84 MG/DL
PLATELET # BLD AUTO: 173 K/UL
POTASSIUM SERPL-SCNC: 4.3 MMOL/L
PROT SERPL-MCNC: 7 G/DL
RBC # BLD: 4.37 M/UL
RBC # FLD: 13.4 %
SODIUM SERPL-SCNC: 141 MMOL/L
TRIGL SERPL-MCNC: 84 MG/DL
TSH SERPL-ACNC: 1.53 UIU/ML
WBC # FLD AUTO: 6.44 K/UL

## 2023-01-09 PROCEDURE — 93000 ELECTROCARDIOGRAM COMPLETE: CPT

## 2023-01-09 PROCEDURE — 99214 OFFICE O/P EST MOD 30 MIN: CPT

## 2023-01-09 PROCEDURE — 99072 ADDL SUPL MATRL&STAF TM PHE: CPT

## 2023-01-09 NOTE — HISTORY OF PRESENT ILLNESS
[FreeTextEntry1] : Adry presented to the office today for a followup evaluation. She was last seen in the office in July.\par \par She is now 88 years old, with a history of bladder cancer. She has a history of  hypertension, with meds discontinued when her pressure dropped. In May of 2016, she had an episode of syncope.  While hospitalized, she was observed to have nonsustained ventricular tachycardia. She was transferred for further evaluation. Nuclear stress testing and echocardiography were unremarkable. A loop recorder was implanted, and more recently explanted. She has a 4.0 cm AAA, which has been followed. She has had a myocardial infarction, for which she had PCI of the circumflex and the right coronary artery. \par \par She presented in  with chest discomfort.   She described the symptoms as being similar but not identical to the symptoms which she was experiencing with her myocardial infarction.  Nuclear stress testing revealed fixed inferior and inferolateral defects, suggestive of infarct, with an ejection fraction of 45%. We decided to try to medically manage. Unfortunately, at the end of  she presented to the hospital with unstable angina. Cardiac catheterization revealed a severe stenosis distal within a small caliber obtuse marginal, which does serve a relatively significant myocardial territory. It was felt most appropriate to try medical therapy, with PCI reserved only for failure of medical therapy.\par \par In early , she reported chest discomfort suspicious for angina. She was referred to the emergency room, and had PCI of the circumflex. Shortly afterward her   after a long illness.  She was seen in the ER with atypical chest pain after that and was discharged after several sets of enzymes.\par \par At the time of visit in , she was feeling well.  Based on an ultrasound and a noncontrast CT, we felt that her aorta had increased significantly in size, in the range of 5 cm.  I referred her to vascular surgery.  A contrast CT revealed that the aorta was only 4.6 cm.\par \par I saw her again in , and she was feeling well.  She had a great grandson recently at that time, and he was doing very well. I saw her again in  and she was feeling well.  I suggested an echocardiogram and a carotid ultrasound. These came out well.  She saw the surgeon in , and another aortic US was performed.  Her aortic dimensions increased from 4.9 to 5.1 cm.\par \par She has been feeling well from a cardiovascular perspective over the last 6 months or so.  She does not report any chest discomfort or shortness of breath.  She is been compliant with all of her medications.  Her blood pressure at home has been excellent.  She has not had recent blood work.

## 2023-01-09 NOTE — DISCUSSION/SUMMARY
[FreeTextEntry1] : Adry is doing well from a cardiovascular perspective. She has no recurrent symptoms of angina. I will make no changes to her medications at this time.\par \par Her blood pressure was quite elevated here, but this has been the case previously. We will rely upon her blood pressures from home, which have been excellent.\par  \par Her abdominal aorta dimensions are somewhere between 4.6 and 5 cm by CT.  The CT with contrast revealed the smaller dimensions.  We will rely upon that measurement for now.  By ultrasound, her dimensions recently increased somewhat.  She is going to see the surgeon Dr. Pedraza in about a month or so, and hopefully, we can continue to be conservative.   \par \par  I reviewed her echocardiogram from July 14, 2022.  This revealed an ejection fraction of 57% with mild mitral regurgitation, moderate aortic stenosis with a calculated aortic valve area of 1.2 cm².  She was found to have segmental wall motion of normalities of the basal and mid inferolateral wall.  I reviewed her carotid ultrasound from July 14, 2022.  This revealed moderate bilateral atherosclerosis without evidence of hemodynamically significant stenosis.\par \par I have suggested a followup evaluation in about 6 months.  We will have blood work done this morning.

## 2023-01-09 NOTE — PHYSICAL EXAM
[Normal Appearance] : normal appearance [General Appearance - In No Acute Distress] : no acute distress [Normal Conjunctiva] : the conjunctiva exhibited no abnormalities [Normal Oral Mucosa] : normal oral mucosa [Normal Jugular Venous V Waves Present] : normal jugular venous V waves present [Exaggerated Use Of Accessory Muscles For Inspiration] : no accessory muscle use [Respiration, Rhythm And Depth] : normal respiratory rhythm and effort [Auscultation Breath Sounds / Voice Sounds] : lungs were clear to auscultation bilaterally [Abdomen Soft] : soft [Abdomen Tenderness] : non-tender [Abdomen Mass (___ Cm)] : no abdominal mass palpated [Abnormal Walk] : normal gait [Nail Clubbing] : no clubbing of the fingernails [Cyanosis, Localized] : no localized cyanosis [Petechial Hemorrhages (___cm)] : no petechial hemorrhages [Skin Color & Pigmentation] : normal skin color and pigmentation [] : no rash [No Venous Stasis] : no venous stasis [Skin Lesions] : no skin lesions [Oriented To Time, Place, And Person] : oriented to person, place, and time [Mood] : the mood was normal [Affect] : the affect was normal [No Anxiety] : not feeling anxious [Not Palpable] : not palpable [No Precordial Heave] : no precordial heave was noted [Normal Rate] : normal [Rhythm Regular] : regular [Normal S1] : normal S1 [Normal S2] : normal S2 [No Gallop] : no gallop heard [No Murmur] : no murmurs heard [Right Carotid Bruit] : right carotid bruit heard [Left Carotid Bruit] : left carotid bruit heard [1+] : left 1+ [___ +] : bilateral [unfilled]U+ pretibial pitting edema [FreeTextEntry1] : No JVD [Bruit] : no bruit heard

## 2023-02-08 ENCOUNTER — RX RENEWAL (OUTPATIENT)
Age: 88
End: 2023-02-08

## 2023-04-06 ENCOUNTER — APPOINTMENT (OUTPATIENT)
Dept: VASCULAR SURGERY | Facility: CLINIC | Age: 88
End: 2023-04-06

## 2023-05-02 ENCOUNTER — APPOINTMENT (OUTPATIENT)
Dept: VASCULAR SURGERY | Facility: CLINIC | Age: 88
End: 2023-05-02
Payer: MEDICARE

## 2023-05-02 PROCEDURE — 99214 OFFICE O/P EST MOD 30 MIN: CPT

## 2023-05-02 PROCEDURE — 93978 VASCULAR STUDY: CPT

## 2023-05-02 PROCEDURE — 99072 ADDL SUPL MATRL&STAF TM PHE: CPT

## 2023-05-02 NOTE — PHYSICAL EXAM
[JVD] : no jugular venous distention  [Normal Breath Sounds] : Normal breath sounds [Normal Rate and Rhythm] : normal rate and rhythm [2+] : left 2+ [Ankle Swelling (On Exam)] : not present [Varicose Veins Of Lower Extremities] : not present [] : not present [Abdomen Tenderness] : ~T ~M No abdominal tenderness [No Rash or Lesion] : No rash or lesion [Alert] : alert [Calm] : calm [de-identified] : Appears well

## 2023-05-02 NOTE — ASSESSMENT
[FreeTextEntry1] : 89-year-old female with a history of aaa presents for follow up\par \par Aortic duplex shows abdominal aortic aneurysm measuring 5.1 cm  unchanged\par We will continue to monitor closely patient to follow-up in 6 months with a repeat duplex\par cont ASA

## 2023-05-02 NOTE — HISTORY OF PRESENT ILLNESS
[FreeTextEntry1] : 89-year-old female with a history of AAA presents for follow up.  Patient without any complaints at this time denies any abdominal or back pain.  Patient denies any difficulty ambulating or pain in the feet\par

## 2023-05-18 ENCOUNTER — EMERGENCY (EMERGENCY)
Facility: HOSPITAL | Age: 88
LOS: 1 days | Discharge: HOME | End: 2023-05-18
Attending: EMERGENCY MEDICINE | Admitting: EMERGENCY MEDICINE
Payer: MEDICARE

## 2023-05-18 VITALS
RESPIRATION RATE: 20 BRPM | OXYGEN SATURATION: 98 % | TEMPERATURE: 97 F | WEIGHT: 143.96 LBS | SYSTOLIC BLOOD PRESSURE: 176 MMHG | DIASTOLIC BLOOD PRESSURE: 80 MMHG | HEART RATE: 79 BPM | HEIGHT: 61 IN

## 2023-05-18 VITALS
RESPIRATION RATE: 18 BRPM | HEART RATE: 80 BPM | DIASTOLIC BLOOD PRESSURE: 69 MMHG | SYSTOLIC BLOOD PRESSURE: 152 MMHG | TEMPERATURE: 97 F | OXYGEN SATURATION: 97 %

## 2023-05-18 DIAGNOSIS — Z92.89 PERSONAL HISTORY OF OTHER MEDICAL TREATMENT: Chronic | ICD-10-CM

## 2023-05-18 DIAGNOSIS — Z98.49 CATARACT EXTRACTION STATUS, UNSPECIFIED EYE: Chronic | ICD-10-CM

## 2023-05-18 LAB
ALBUMIN SERPL ELPH-MCNC: 3.3 G/DL — SIGNIFICANT CHANGE UP (ref 3.3–5)
ALP SERPL-CCNC: 101 U/L — SIGNIFICANT CHANGE UP (ref 40–120)
ALT FLD-CCNC: 22 U/L — SIGNIFICANT CHANGE UP (ref 12–78)
ANION GAP SERPL CALC-SCNC: -1 MMOL/L — LOW (ref 5–17)
AST SERPL-CCNC: 35 U/L — SIGNIFICANT CHANGE UP (ref 15–37)
BASOPHILS # BLD AUTO: 0.02 K/UL — SIGNIFICANT CHANGE UP (ref 0–0.2)
BASOPHILS NFR BLD AUTO: 0.3 % — SIGNIFICANT CHANGE UP (ref 0–2)
BILIRUB SERPL-MCNC: 0.4 MG/DL — SIGNIFICANT CHANGE UP (ref 0.2–1.2)
BUN SERPL-MCNC: 24 MG/DL — HIGH (ref 7–23)
CALCIUM SERPL-MCNC: 8.9 MG/DL — SIGNIFICANT CHANGE UP (ref 8.5–10.1)
CHLORIDE SERPL-SCNC: 110 MMOL/L — HIGH (ref 96–108)
CO2 SERPL-SCNC: 27 MMOL/L — SIGNIFICANT CHANGE UP (ref 22–31)
CREAT SERPL-MCNC: 0.8 MG/DL — SIGNIFICANT CHANGE UP (ref 0.5–1.3)
EGFR: 70 ML/MIN/1.73M2 — SIGNIFICANT CHANGE UP
EOSINOPHIL # BLD AUTO: 0.24 K/UL — SIGNIFICANT CHANGE UP (ref 0–0.5)
EOSINOPHIL NFR BLD AUTO: 4 % — SIGNIFICANT CHANGE UP (ref 0–6)
GLUCOSE SERPL-MCNC: 155 MG/DL — HIGH (ref 70–99)
HCT VFR BLD CALC: 35.6 % — SIGNIFICANT CHANGE UP (ref 34.5–45)
HGB BLD-MCNC: 11.3 G/DL — LOW (ref 11.5–15.5)
IMM GRANULOCYTES NFR BLD AUTO: 0.3 % — SIGNIFICANT CHANGE UP (ref 0–0.9)
LYMPHOCYTES # BLD AUTO: 1.16 K/UL — SIGNIFICANT CHANGE UP (ref 1–3.3)
LYMPHOCYTES # BLD AUTO: 19.3 % — SIGNIFICANT CHANGE UP (ref 13–44)
MCHC RBC-ENTMCNC: 29.6 PG — SIGNIFICANT CHANGE UP (ref 27–34)
MCHC RBC-ENTMCNC: 31.7 GM/DL — LOW (ref 32–36)
MCV RBC AUTO: 93.2 FL — SIGNIFICANT CHANGE UP (ref 80–100)
MONOCYTES # BLD AUTO: 0.56 K/UL — SIGNIFICANT CHANGE UP (ref 0–0.9)
MONOCYTES NFR BLD AUTO: 9.3 % — SIGNIFICANT CHANGE UP (ref 2–14)
NEUTROPHILS # BLD AUTO: 4 K/UL — SIGNIFICANT CHANGE UP (ref 1.8–7.4)
NEUTROPHILS NFR BLD AUTO: 66.8 % — SIGNIFICANT CHANGE UP (ref 43–77)
NRBC # BLD: 0 /100 WBCS — SIGNIFICANT CHANGE UP (ref 0–0)
PLATELET # BLD AUTO: 213 K/UL — SIGNIFICANT CHANGE UP (ref 150–400)
POTASSIUM SERPL-MCNC: 3.7 MMOL/L — SIGNIFICANT CHANGE UP (ref 3.5–5.3)
POTASSIUM SERPL-SCNC: 3.7 MMOL/L — SIGNIFICANT CHANGE UP (ref 3.5–5.3)
PROT SERPL-MCNC: 7.2 G/DL — SIGNIFICANT CHANGE UP (ref 6–8.3)
RBC # BLD: 3.82 M/UL — SIGNIFICANT CHANGE UP (ref 3.8–5.2)
RBC # FLD: 13.9 % — SIGNIFICANT CHANGE UP (ref 10.3–14.5)
SODIUM SERPL-SCNC: 136 MMOL/L — SIGNIFICANT CHANGE UP (ref 135–145)
TROPONIN I, HIGH SENSITIVITY RESULT: 23.9 NG/L — SIGNIFICANT CHANGE UP
TROPONIN I, HIGH SENSITIVITY RESULT: 27.2 NG/L — SIGNIFICANT CHANGE UP
WBC # BLD: 6 K/UL — SIGNIFICANT CHANGE UP (ref 3.8–10.5)
WBC # FLD AUTO: 6 K/UL — SIGNIFICANT CHANGE UP (ref 3.8–10.5)

## 2023-05-18 PROCEDURE — 71045 X-RAY EXAM CHEST 1 VIEW: CPT | Mod: 26

## 2023-05-18 PROCEDURE — 93005 ELECTROCARDIOGRAM TRACING: CPT

## 2023-05-18 PROCEDURE — 99285 EMERGENCY DEPT VISIT HI MDM: CPT

## 2023-05-18 PROCEDURE — 99285 EMERGENCY DEPT VISIT HI MDM: CPT | Mod: 25

## 2023-05-18 PROCEDURE — 84484 ASSAY OF TROPONIN QUANT: CPT

## 2023-05-18 PROCEDURE — 93010 ELECTROCARDIOGRAM REPORT: CPT

## 2023-05-18 PROCEDURE — 71045 X-RAY EXAM CHEST 1 VIEW: CPT

## 2023-05-18 PROCEDURE — 36415 COLL VENOUS BLD VENIPUNCTURE: CPT

## 2023-05-18 PROCEDURE — 80053 COMPREHEN METABOLIC PANEL: CPT

## 2023-05-18 PROCEDURE — 85025 COMPLETE CBC W/AUTO DIFF WBC: CPT

## 2023-05-18 NOTE — CONSULT NOTE ADULT - ASSESSMENT
Assessment/Plan:  88 y/o F with Hx of CAD s/p stent to mRCA (2019) and OM1 (10/2020), AAA, HTN, Syncope s/p ILR  (removed in 2019), CVA, bladder Ca, lower back pain, vertigo, right cataract extraction presented to the ED c/o chest pain x 1 hour relieved by NTG and Sprite.      Chest Pain  - Has known Hx of CAD s/p stent to OM1 (10/2020)  - Trend CE's till peaks.  Obtain EKG  - Continue home cardiac meds         89f, with a history of bladder cancer. She has a history of hypertension, with meds discontinued when her pressure dropped. In May of 2016, she had an episode of syncope. While hospitalized, she was observed to have nonsustained ventricular tachycardia. She was transferred for further evaluation. Nuclear stress testing and echocardiography were unremarkable. A loop recorder was implanted, and more recently explanted. She has a ~5 cm AAA, which has been followed. She has had a myocardial infarction, for which she had PCI of the circumflex and the right coronary artery.     She presented in  with chest discomfort. She described the symptoms as being similar but not identical to the symptoms which she was experiencing with her myocardial infarction. Nuclear stress testing revealed fixed inferior and inferolateral defects, suggestive of infarct, with an ejection fraction of 45%. We decided to try to medically manage. Unfortunately, at the end of  she presented to the hospital with unstable angina. Cardiac catheterization revealed a severe stenosis distal within a small caliber obtuse marginal, which served a relatively significant myocardial territory. It was felt most appropriate to try medical therapy, with PCI reserved only for failure of medical therapy.    In early , she reported chest discomfort suspicious for angina. She was referred to the emergency room, and had PCI of the circumflex. Shortly afterward her   after a long illness. She was seen in the ER with atypical chest pain after that and was discharged after several sets of enzymes.    At the time of the office visit in , she was feeling well. Based on an ultrasound and a noncontrast CT, we felt that her aorta had increased significantly in size, in the range of 5 cm. I referred her to vascular surgery. A contrast CT revealed that the aorta was only 4.6 cm.  I saw her several times in the office between  and 2023 and she was doing well overall.    She was generally well until this afternoon.  She was stressed about some bills and began to experience chest discomfort, between her breasts, described as a pressure, different from her prior sxs of cad.  Shortly after it started she took a ntg sl, sprite and an acidophilus pill, after about 45 minutes or so the sxs resolved.  She does note some mild  sweatiness with the sxs but no rad and no other assoc sxs, she now feels well.    -there is no evidence of acute ischemia.  -there is no evidence of significant arrhythmia.  -there is no evidence for meaningful  volume overload.     -known cad but her sx feel different from what she has had as ischemic sxs in the past  -ekg normal sinus without ischemia  -labs pending    -would check two sets ce. if normal would dc home for outpt followup    discussed with patient and her daughter in detail

## 2023-05-18 NOTE — ED PROVIDER NOTE - CLINICAL SUMMARY MEDICAL DECISION MAKING FREE TEXT BOX
88 yo F pmh of bladder cancer, hypertension, AAA, MI status post PCI, on aspirin, p/w Cp since 1530 today 2/2 stress  seen by her cardiologist Dr. Riley  2 trop negative  ekg nonischemic  cxr unremarkable for acute path requiring intervention    dc w pmd and cards f/u  has pmd appt tm   Based on the H&P, I do not suspect any life- / limb- threatening pathology that requires further intervention at this time.

## 2023-05-18 NOTE — ED PROVIDER NOTE - PATIENT PORTAL LINK FT
You can access the FollowMyHealth Patient Portal offered by [facility] by registering at the following website: [email or web address]. By joining [email or web address]’s FollowMyHealth portal, you will also be able to view your health information using other applications (apps) compatible with our system.

## 2023-05-18 NOTE — ED PROVIDER NOTE - NSICDXPASTMEDICALHX_GEN_ALL_CORE_FT
PAST MEDICAL HISTORY:  AAA (abdominal aortic aneurysm)     Back pain, chronic occasional lower back pain    Benign paroxysmal vertigo of right ear history of right ear abscess    Bladder cancer dx 2012 treated with BCG    Coronary artery disease of native artery of native heart with stable angina pectoris     History of loop recorder placed by Dr. DHARMESH Ibarra. Removed 2019    HTN (hypertension)     Neurological complaint 4/29/19 code stroke post cardiac cath c/o LUE weakness, NIHSS score 0, symptoms reolved next day    Syncope, unspecified syncope type

## 2023-05-18 NOTE — CONSULT NOTE ADULT - SUBJECTIVE AND OBJECTIVE BOX
[facility] - Osvaldo Velázquez, Manjeet Carrillo [place], [place]  Office Number: [phone]    Initial Consult Note    CHIEF COMPLAINT: Patient is a 89y old  Female who presents with a chief complaint of     HPI:  This is an 89 y/o F with Hx of CAD, AAA, HTN, Syncope s/p ILR  (removed in 2019), CVA, bladder Ca, lower back pain, vertigo, right cataract extraction presented to the ED c/o chest pain x 1 hour relieved by NTG and Sprite.      PAST MEDICAL & SURGICAL HISTORY:  Bladder cancer  dx 2012 treated with BCG  Benign paroxysmal vertigo of right ear  history of right ear abscess  Back pain, chronic  occasional lower back pain  HTN (hypertension)  Coronary artery disease of native artery of native heart with stable angina pectoris  AAA (abdominal aortic aneurysm)  Syncope, unspecified syncope type  History of loop recorder  placed by Dr. DHARMESH Ibarra. Removed 2019  Neurological complaint  4/29/19 code stroke post cardiac cath c/o LUE weakness, NIHSS score 0, symptoms reolved next day  S/P breast biopsy, left  S/P D&C  S/P cystoscopy  History of cardiac monitoring  loop monitor  removed 7/22/19  Status post cataract extraction  OD    SOCIAL HISTORY:  No tobacco, ethanol, or drug abuse.  FAMILY HISTORY:  Family history of diabetes mellitus    Family history of hypertension    Family history of MI (myocardial infarction) (Sibling)  at 59 years of age    No pertinent family history in first degree relatives      No family history of acute MI or sudden cardiac death.  MEDICATIONS  (STANDING):    MEDICATIONS  (PRN):    Allergies    adhesives (Other)  latex (Rash)  No Known Drug Allergies    Intolerances    statins (Muscle Pain)    REVIEW OF SYSTEMS:  CONSTITUTIONAL: No weakness, fevers or chills  EYES/ENT: No visual changes;  No vertigo or throat pain   NECK: No pain or stiffness  RESPIRATORY: No cough, wheezing, hemoptysis; No shortness of breath  CARDIOVASCULAR: No chest pain or palpitations  GASTROINTESTINAL: No abdominal pain. No nausea, vomiting, or hematemesis; No diarrhea or constipation. No melena or hematochezia.  GENITOURINARY: No dysuria, frequency or hematuria  NEUROLOGICAL: No numbness or weakness  SKIN: No itching or rash  All other review of systems is negative unless indicated above  VITAL SIGNS:   Vital Signs Last 24 Hrs  T(C): 36.2 (18 May 2023 17:26), Max: 36.2 (18 May 2023 17:26)  T(F): 97.2 (18 May 2023 17:26), Max: 97.2 (18 May 2023 17:26)  HR: 79 (18 May 2023 17:26) (79 - 79)  BP: 176/80 (18 May 2023 17:26) (176/80 - 176/80)  BP(mean): --  RR: 20 (18 May 2023 17:26) (20 - 20)  SpO2: 98% (18 May 2023 17:26) (98% - 98%)    Parameters below as of 18 May 2023 17:26  Patient On (Oxygen Delivery Method): room air      I&O's Summary    On Exam:  Constitutional: NAD, alert and oriented x 3  Lungs:  Non-labored, breath sounds are clear bilaterally, No wheezing, rales or rhonchi  Cardiovascular: RRR.  S1 and S2 positive.  No murmurs, rubs, gallops or clicks  Gastrointestinal: Bowel Sounds present, soft, nontender.   Lymph: No peripheral edema. No cervical lymphadenopathy.  Neurological: Alert, no focal deficits  Skin: No rashes or ulcers   Psych:  Mood & affect appropriate.    LABS: All Labs Reviewed:    RADIOLOGY:    Patient name: VANNESA HICKS  YOB: 1934   Age: 85 (F)   MR#: [id number]  Study Date: 4/30/2019  [facility]  Study quality: Technically fair  Referring Physician: Shamar Cornelius MD  Blood Pressure: 141/65 mmHg  Height: 158 cm  Weight: 74 kg  BSA: 1.8 m2  Heart Rate: 84 mmHg  ------------------------------------------------------------------------  PROCEDURE: Transthoracic echocardiogram with 2-D, M-Mode  and complete spectral and color flow Doppler.  INDICATION: Abnormal electrocardiogram (ECG) (EKG) (R94.31)  ------------------------------------------------------------------------  Dimensions:    Normal Values:  LA:     3.5    2.0 - 4.0 cm  Ao:     2.8    2.0 - 3.8 cm  SEPTUM: 1.0    0.6 - 1.2 cm  PWT:    1.0    0.6 - 1.1 cm  LVIDd:  4.2    3.0 - 5.6 cm  LVIDs:  3.0    1.8 - 4.0 cm  Derived variables:  LVMI: 78 g/m2  RWT: 0.47  Fractional short: 29 %  EF (Visual Estimate): 60-65 %  Doppler Peak Velocity (m/sec): AoV=1.4  ------------------------------------------------------------------------  Observations:  Mitral Valve: Mitral annular calcification, otherwise  normal mitral valve. Minimal mitral regurgitation.  Aortic Valve/Aorta: Aortic valve not well visualized;  appears calcified. Peak transaortic valve gradient equals 8  mm Hg, estimated aortic valve area equals 1.8 sqcm (by  continuity equation), aortic valve velocity time integral  equals 27 cm, consistent with mild aortic stenosis. Minimal  aortic regurgitation.  Peak left ventricular outflow tract  gradient equals 2 mm Hg, LVOT velocity time integral equals  17 cm.  Aortic Root: 2.8 cm.  LVOT diameter: 1.9 cm.  Left Atrium: Normal left atrium.  LA volume index = 29  cc/m2.  Left Ventricle: Endocardium not well visualized; grossly  normal left ventricular systolic function. Increased  relative wall thickness with normal left ventricular mass  index, consistent with concentric left ventricular  remodeling. Moderate diastolic dysfunction (Stage II).  Right Heart: Normal right atrium. The right ventricle is  not well visualized; grossly normal right ventricular  systolic function. Tricuspid valve not well visualized,  probably normal. Minimal tricuspid regurgitation. Pulmonic  valve not well visualized. Minimal pulmonic regurgitation.  Pericardium/Pleura: Normal pericardium with no pericardial  effusion.  Hemodynamic: Estimated right atrial pressure is 8 mm Hg.  Estimated right ventricular systolic pressure equals 12 mm  Hg, assuming right atrial pressure equals 8 mm Hg,  consistent with normal pulmonary pressures.  ------------------------------------------------------------------------  Conclusions:  1. Aortic valve not well visualized; appears calcified.  Peak transaortic valve gradient equals 8 mm Hg, estimated  aortic valve area equals 1.8 sqcm (by continuity equation),  aortic valve velocity time integral equals 27 cm,  consistent with mild aortic stenosis. Minimal aortic  regurgitation.  2. Increased relative wall thickness with normal left  ventricular mass index, consistent with concentric left  ventricular remodeling.  3. Endocardium not well visualized; grossly normal left  ventricular systolic function.  4. Moderate diastolic dysfunction (Stage II).  5. The right ventricle is not well visualized; grossly  normal right ventricular systolic function.  6. Estimated right ventricular systolic pressure equals 12  mm Hg, assuming right atrial pressure equals 8 mm Hg,  consistent with normal pulmonary pressures.  *** No previous Echo exam.  ------------------------------------------------------------------------  Confirmed on  4/30/2019 - 21:42:49 by Logan Carrington M.D.  ------------------------------------------------------------------------  EKG:           [facility] - Osvaldo Velázquez, Manjeet Carrillo, Constance Akhtar  Office Number: [phone]    Initial Consult Note    CHIEF COMPLAINT: Patient is a 89y old  Female who presents with a chief complaint of cp    HPI: She is now 89 years old, with a history of bladder cancer. She has a history of hypertension, with meds discontinued when her pressure dropped. In May of 2016, she had an episode of syncope. While hospitalized, she was observed to have nonsustained ventricular tachycardia. She was transferred for further evaluation. Nuclear stress testing and echocardiography were unremarkable. A loop recorder was implanted, and more recently explanted. She has a ~5 cm AAA, which has been followed. She has had a myocardial infarction, for which she had PCI of the circumflex and the right coronary artery.     She presented in  with chest discomfort. She described the symptoms as being similar but not identical to the symptoms which she was experiencing with her myocardial infarction. Nuclear stress testing revealed fixed inferior and inferolateral defects, suggestive of infarct, with an ejection fraction of 45%. We decided to try to medically manage. Unfortunately, at the end of  she presented to the hospital with unstable angina. Cardiac catheterization revealed a severe stenosis distal within a small caliber obtuse marginal, which served a relatively significant myocardial territory. It was felt most appropriate to try medical therapy, with PCI reserved only for failure of medical therapy.    In early , she reported chest discomfort suspicious for angina. She was referred to the emergency room, and had PCI of the circumflex. Shortly afterward her   after a long illness. She was seen in the ER with atypical chest pain after that and was discharged after several sets of enzymes.    At the time of the office visit in , she was feeling well. Based on an ultrasound and a noncontrast CT, we felt that her aorta had increased significantly in size, in the range of 5 cm. I referred her to vascular surgery. A contrast CT revealed that the aorta was only 4.6 cm.  I saw her several times in the office between  and 2023 and she was doing well overall.    She was generally well until this afternoon.  She was stressed about some bills and began to experience chest discomfort, between her breasts, described as a pressure, different from her prior sxs of cad.  Shortly after it started she took a ntg sl, sprite and an acidophilus pill, after about 45 minutes or so the sxs resolved.  She does note some mild  sweatiness with the sxs but no rad and no other assoc sxs, she now feels well/        PAST MEDICAL & SURGICAL HISTORY:  Bladder cancer  dx  treated with BCG  Benign paroxysmal vertigo of right ear  history of right ear abscess  Back pain, chronic  occasional lower back pain  HTN (hypertension)  Coronary artery disease of native artery of native heart with stable angina pectoris  AAA (abdominal aortic aneurysm)  Syncope, unspecified syncope type  History of loop recorder  placed by Dr. DHARMESH Ibarra. Removed   Neurological complaint  19 code stroke post cardiac cath c/o LUE weakness, NIHSS score 0, symptoms reolved next day  S/P breast biopsy, left  S/P D&C  S/P cystoscopy  History of cardiac monitoring  loop monitor  removed 19  Status post cataract extraction  OD    SOCIAL HISTORY:  No tobacco, ethanol, or drug abuse.  FAMILY HISTORY:  Family history of diabetes mellitus    Family history of hypertension    Family history of MI (myocardial infarction) (Sibling)  at 59 years of age    No pertinent family history in first degree relatives      No family history of acute MI or sudden cardiac death.  MEDICATIONS  (STANDING):    MEDICATIONS  (PRN):    Allergies    adhesives (Other)  latex (Rash)  No Known Drug Allergies    Intolerances    statins (Muscle Pain)    REVIEW OF SYSTEMS:  CONSTITUTIONAL: No weakness, fevers or chills  EYES/ENT: No visual changes;  No vertigo or throat pain   NECK: No pain or stiffness  RESPIRATORY: No cough, wheezing, hemoptysis; No shortness of breath  CARDIOVASCULAR: No chest pain or palpitations  GASTROINTESTINAL: No abdominal pain. No nausea, vomiting, or hematemesis; No diarrhea or constipation. No melena or hematochezia.  GENITOURINARY: No dysuria, frequency or hematuria  NEUROLOGICAL: No numbness or weakness  SKIN: No itching or rash  All other review of systems is negative unless indicated above  VITAL SIGNS:   Vital Signs Last 24 Hrs  T(C): 36.2 (18 May 2023 17:26), Max: 36.2 (18 May 2023 17:26)  T(F): 97.2 (18 May 2023 17:26), Max: 97.2 (18 May 2023 17:26)  HR: 79 (18 May 2023 17:) (79 - 79)  BP: 176/80 (18 May 2023 17:26) (176/80 - 176/80)  BP(mean): --  RR: 20 (18 May 2023 17:26) (20 - 20)  SpO2: 98% (18 May 2023 17:26) (98% - 98%)    Parameters below as of 18 May 2023 17:26  Patient On (Oxygen Delivery Method): room air      I&O's Summary    On Exam:  Constitutional: NAD, alert and oriented x 3  Lungs:  Non-labored, breath sounds are clear bilaterally, No wheezing, rales or rhonchi  Cardiovascular: RRR.  S1 and S2 positive.  No murmurs, rubs, gallops or clicks  Gastrointestinal: Bowel Sounds present, soft, nontender.   Lymph: No peripheral edema. No cervical lymphadenopathy.  Neurological: Alert, no focal deficits  Skin: No rashes or ulcers   Psych:  Mood & affect appropriate.    LABS: All Labs Reviewed:    RADIOLOGY:    Patient name: VANNESA HICKS  YOB: 1934   Age: 85 (F)   MR#: [id number]  Study Date: 2019  [facility]  Study quality: Technically fair  Referring Physician: Shamar Cornelius MD  Blood Pressure: 141/65 mmHg  Height: 158 cm  Weight: 74 kg  BSA: 1.8 m2  Heart Rate: 84 mmHg  ------------------------------------------------------------------------  PROCEDURE: Transthoracic echocardiogram with 2-D, M-Mode  and complete spectral and color flow Doppler.  INDICATION: Abnormal electrocardiogram (ECG) (EKG) (R94.31)  ------------------------------------------------------------------------  Dimensions:    Normal Values:  LA:     3.5    2.0 - 4.0 cm  Ao:     2.8    2.0 - 3.8 cm  SEPTUM: 1.0    0.6 - 1.2 cm  PWT:    1.0    0.6 - 1.1 cm  LVIDd:  4.2    3.0 - 5.6 cm  LVIDs:  3.0    1.8 - 4.0 cm  Derived variables:  LVMI: 78 g/m2  RWT: 0.47  Fractional short: 29 %  EF (Visual Estimate): 60-65 %  Doppler Peak Velocity (m/sec): AoV=1.4  ------------------------------------------------------------------------  Observations:  Mitral Valve: Mitral annular calcification, otherwise  normal mitral valve. Minimal mitral regurgitation.  Aortic Valve/Aorta: Aortic valve not well visualized;  appears calcified. Peak transaortic valve gradient equals 8  mm Hg, estimated aortic valve area equals 1.8 sqcm (by  continuity equation), aortic valve velocity time integral  equals 27 cm, consistent with mild aortic stenosis. Minimal  aortic regurgitation.  Peak left ventricular outflow tract  gradient equals 2 mm Hg, LVOT velocity time integral equals  17 cm.  Aortic Root: 2.8 cm.  LVOT diameter: 1.9 cm.  Left Atrium: Normal left atrium.  LA volume index = 29  cc/m2.  Left Ventricle: Endocardium not well visualized; grossly  normal left ventricular systolic function. Increased  relative wall thickness with normal left ventricular mass  index, consistent with concentric left ventricular  remodeling. Moderate diastolic dysfunction (Stage II).  Right Heart: Normal right atrium. The right ventricle is  not well visualized; grossly normal right ventricular  systolic function. Tricuspid valve not well visualized,  probably normal. Minimal tricuspid regurgitation. Pulmonic  valve not well visualized. Minimal pulmonic regurgitation.  Pericardium/Pleura: Normal pericardium with no pericardial  effusion.  Hemodynamic: Estimated right atrial pressure is 8 mm Hg.  Estimated right ventricular systolic pressure equals 12 mm  Hg, assuming right atrial pressure equals 8 mm Hg,  consistent with normal pulmonary pressures.  ------------------------------------------------------------------------  Conclusions:  1. Aortic valve not well visualized; appears calcified.  Peak transaortic valve gradient equals 8 mm Hg, estimated  aortic valve area equals 1.8 sqcm (by continuity equation),  aortic valve velocity time integral equals 27 cm,  consistent with mild aortic stenosis. Minimal aortic  regurgitation.  2. Increased relative wall thickness with normal left  ventricular mass index, consistent with concentric left  ventricular remodeling.  3. Endocardium not well visualized; grossly normal left  ventricular systolic function.  4. Moderate diastolic dysfunction (Stage II).  5. The right ventricle is not well visualized; grossly  normal right ventricular systolic function.  6. Estimated right ventricular systolic pressure equals 12  mm Hg, assuming right atrial pressure equals 8 mm Hg,  consistent with normal pulmonary pressures.  *** No previous Echo exam.  ------------------------------------------------------------------------  Confirmed on  2019 - 21:42:49 by Logan Carrington M.D.  ------------------------------------------------------------------------  EKG: ilana

## 2023-05-18 NOTE — ED ADULT NURSE NOTE - NSFALLUNIVINTERV_ED_ALL_ED
Bed/Stretcher in lowest position, wheels locked, appropriate side rails in place/Call bell, personal items and telephone in reach/Instruct patient to call for assistance before getting out of bed/chair/stretcher/Non-slip footwear applied when patient is off stretcher/[place] to call system/Physically safe environment - no spills, clutter or unnecessary equipment/Purposeful proactive rounding/Room/bathroom lighting operational, light cord in reach

## 2023-05-18 NOTE — ED ADULT NURSE NOTE - NURSING GU BLADDER
Patient alert and oriented x3-4, can be a little forgetful at times. VSS. Afebrile. On 2 L NC. NG tube in L nare on low intermittent suction. NPO maintained with ice chips. IV Toradol given for headache. IV 0.9 infusing at 83 per MAR. Pt up to bathroom standby assist with walker. Pt reports urinary urgency. No complaints of nausea. No further needs at this time. 8900: Pt reports nausea and vomiting. Emesis clear with a little blood. IV Zofran given per MAR. Problem: PAIN - ADULT  Goal: Verbalizes/displays adequate comfort level or patient's stated pain goal  Description: INTERVENTIONS:  - Encourage pt to monitor pain and request assistance  - Assess pain using appropriate pain scale  - Administer analgesics based on type and severity of pain and evaluate response  - Implement non-pharmacological measures as appropriate and evaluate response  - Consider cultural and social influences on pain and pain management  - Manage/alleviate anxiety  - Utilize distraction and/or relaxation techniques  - Monitor for opioid side effects  - Notify MD/LIP if interventions unsuccessful or patient reports new pain  - Anticipate increased pain with activity and pre-medicate as appropriate  Outcome: Progressing     Problem: SAFETY ADULT - FALL  Goal: Free from fall injury  Description: INTERVENTIONS:  - Assess pt frequently for physical needs  - Identify cognitive and physical deficits and behaviors that affect risk of falls.   - [facility] fall precautions as indicated by assessment.  - Educate pt/family on patient safety including physical limitations  - Instruct pt to call for assistance with activity based on assessment  - Modify environment to reduce risk of injury  - Provide assistive devices as appropriate  - Consider OT/PT consult to assist with strengthening/mobility  - Encourage toileting schedule  Outcome: Progressing     Problem: GASTROINTESTINAL - ADULT  Goal: Minimal or absence of nausea and vomiting  Description: INTERVENTIONS:  - Maintain adequate hydration with IV or PO as ordered and tolerated  - Nasogastric tube to low intermittent suction as ordered  - Evaluate effectiveness of ordered antiemetic medications  - Provide nonpharmacologic comfort measures as appropriate  - Advance diet as tolerated, if ordered  - Obtain nutritional consult as needed  - Evaluate fluid balance  Outcome: Progressing     Problem: METABOLIC/FLUID AND ELECTROLYTES - ADULT  Goal: Electrolytes maintained within normal limits  Description: INTERVENTIONS:  - Monitor labs and rhythm and assess patient for signs and symptoms of electrolyte imbalances  - Administer electrolyte replacement as ordered  - Monitor response to electrolyte replacements, including rhythm and repeat lab results as appropriate  - Fluid restriction as ordered  - Instruct patient on fluid and nutrition restrictions as appropriate  Outcome: Progressing non-distended/non-tender

## 2023-05-18 NOTE — ED PROVIDER NOTE - OBJECTIVE STATEMENT
89-year-old female history of bladder cancer, hypertension, AAA, MI status post PCI, on aspirin, presents to the ED with her daughter due to midsternal chest pain that occurred after stress related to bills.  This occurred around 3:30 PM/3:45 PM and took nitro around 4:30 PM.  Patient had a lot of burping and also drink some Sprite and took some acidophilus.  Symptoms resolved by the time she arrived in the ED.  States symptoms different to when she has had prior cardiac events.  Otherwise has been in her usual state of health

## 2023-05-18 NOTE — ED PROVIDER NOTE - NSFOLLOWUPINSTRUCTIONS_ED_ALL_ED_FT
Chest pain can be caused by many different conditions. Some causes of chest pain can be life-threatening. These will require treatment right away. Serious causes of chest pain include:  Heart attack.  A tear in the body's main blood vessel.  Redness and swelling (inflammation) around your heart.  Blood clot in your lungs.  Other causes of chest pain may not be so serious. These include:  Heartburn.  Anxiety or stress.  Damage to bones or muscles in your chest.  Lung infections.  Chest pain can feel like:  Pain or discomfort in your chest.  Crushing, pressure, aching, or squeezing pain.  Burning or tingling.  Dull or sharp pain that is worse when you move, cough, or take a deep breath.  Pain or discomfort that is also felt in your back, neck, jaw, shoulder, or arm, or pain that spreads to any of these areas.  It is hard to know whether your pain is caused by something that is serious or something that is not so serious. So it is important to see your doctor right away if you have chest pain.    Follow these instructions at home:  Medicines    Take over-the-counter and prescription medicines only as told by your doctor.  If you were prescribed an antibiotic medicine, take it as told by your doctor. Do not stop taking the antibiotic even if you start to feel better.  Lifestyle    A plate along with examples of foods in a healthy diet.  Rest as told by your doctor.  Do not use any products that contain nicotine or tobacco, such as cigarettes, e-cigarettes, and chewing tobacco. If you need help quitting, ask your doctor.  Do not drink alcohol.  Make lifestyle changes as told by your doctor. These may include:  Getting regular exercise. Ask your doctor what activities are safe for you.  Eating a heart-healthy diet. A diet and nutrition specialist (dietitian) can help you to learn healthy eating options.  Staying at a healthy weight.  Treating diabetes or high blood pressure, if needed.  Lowering your stress. Activities such as yoga and relaxation techniques can help.  General instructions    Pay attention to any changes in your symptoms. Tell your doctor about them or any new symptoms.  Avoid any activities that cause chest pain.  Keep all follow-up visits as told by your doctor. This is important. You may need more testing if your chest pain does not go away.  Contact a doctor if:  Your chest pain does not go away.  You feel depressed.  You have a fever.  Get help right away if:  Your chest pain is worse.  You have a cough that gets worse, or you cough up blood.  You have very bad (severe) pain in your belly (abdomen).  You pass out (faint).  You have either of these for no clear reason:  Sudden chest discomfort.  Sudden discomfort in your arms, back, neck, or jaw.  You have shortness of breath at any time.  You suddenly start to sweat, or your skin gets clammy.  You feel sick to your stomach (nauseous).  You throw up (vomit).  You suddenly feel lightheaded or dizzy.  You feel very weak or tired.  Your heart starts to beat fast, or it feels like it is skipping beats.  These symptoms may be an emergency. Do not wait to see if the symptoms will go away. Get medical help right away. Call your local emergency services (911 in the [place] Do not drive yourself to the hospital.    Summary  Chest pain can be caused by many different conditions. The cause may be serious and need treatment right away. If you have chest pain, see your doctor right away.  Follow your doctor's instructions for taking medicines and making lifestyle changes.  Keep all follow-up visits as told by your doctor. This includes visits for any further testing if your chest pain does not go away.  Be sure to know the signs that show that your condition has become worse. Get help right away if you have these symptoms.  This information is not intended to replace advice given to you by your health care provider. Make sure you discuss any questions you have with your health care provider.

## 2023-05-18 NOTE — ED PROVIDER NOTE - NSICDXPASTSURGICALHX_GEN_ALL_CORE_FT
PAST SURGICAL HISTORY:  History of cardiac monitoring loop monitor  removed 7/22/19    S/P breast biopsy, left     S/P cystoscopy     S/P D&C     Status post cataract extraction OD

## 2023-05-18 NOTE — ED PROVIDER NOTE - CARE PROVIDER_API CALL
Miguel Riley)  Cardiovascular Disease  [address] [place] [address]  Phone: [phone]  Fax: [phone]  Follow Up Time:

## 2023-07-11 ENCOUNTER — APPOINTMENT (OUTPATIENT)
Dept: CARDIOLOGY | Facility: CLINIC | Age: 88
End: 2023-07-11
Payer: MEDICARE

## 2023-07-11 ENCOUNTER — NON-APPOINTMENT (OUTPATIENT)
Age: 88
End: 2023-07-11

## 2023-07-11 VITALS
OXYGEN SATURATION: 96 % | BODY MASS INDEX: 26.05 KG/M2 | DIASTOLIC BLOOD PRESSURE: 78 MMHG | HEIGHT: 63 IN | WEIGHT: 147 LBS | HEART RATE: 71 BPM | SYSTOLIC BLOOD PRESSURE: 156 MMHG

## 2023-07-11 PROCEDURE — 99214 OFFICE O/P EST MOD 30 MIN: CPT

## 2023-07-11 PROCEDURE — 93000 ELECTROCARDIOGRAM COMPLETE: CPT

## 2023-07-11 NOTE — DISCUSSION/SUMMARY
[FreeTextEntry1] : Adry is doing well from a cardiovascular perspective. She has no recurrent symptoms of angina. I will make no changes to her medications at this time.\par \par Her blood pressure was somewhat elevated here, but this has been the case previously. We will rely upon her blood pressures from home, which have been excellent.\par  \par Her abdominal aorta dimensions have been increasing slowly, as expected.  She is going to see the surgeon Dr. Pedraza in the near future, and hopefully, we can continue to be conservative.   \par \par  I reviewed her echocardiogram from July 14, 2022.  This revealed an ejection fraction of 57% with mild mitral regurgitation, moderate aortic stenosis with a calculated aortic valve area of 1.2 cm².  She was found to have segmental wall motion of normalities of the basal and mid inferolateral wall.  I reviewed her carotid ultrasound from July 14, 2022.  This revealed moderate bilateral atherosclerosis without evidence of hemodynamically significant stenosis.\par \par I have suggested repetition of her echocardiogram and carotid ultrasound.  She will schedule these, and I will be in contact with her to discuss the results.  She will have blood work done this morning.\par \par I have suggested a followup evaluation in about 6 months.

## 2023-07-11 NOTE — PHYSICAL EXAM
[Normal Appearance] : normal appearance [General Appearance - In No Acute Distress] : no acute distress [Normal Conjunctiva] : the conjunctiva exhibited no abnormalities [Normal Oral Mucosa] : normal oral mucosa [Normal Jugular Venous V Waves Present] : normal jugular venous V waves present [Respiration, Rhythm And Depth] : normal respiratory rhythm and effort [Exaggerated Use Of Accessory Muscles For Inspiration] : no accessory muscle use [Auscultation Breath Sounds / Voice Sounds] : lungs were clear to auscultation bilaterally [Abdomen Soft] : soft [Abdomen Tenderness] : non-tender [Abnormal Walk] : normal gait [Abdomen Mass (___ Cm)] : no abdominal mass palpated [Nail Clubbing] : no clubbing of the fingernails [Cyanosis, Localized] : no localized cyanosis [Skin Color & Pigmentation] : normal skin color and pigmentation [Petechial Hemorrhages (___cm)] : no petechial hemorrhages [] : no rash [No Venous Stasis] : no venous stasis [Skin Lesions] : no skin lesions [Oriented To Time, Place, And Person] : oriented to person, place, and time [Affect] : the affect was normal [Mood] : the mood was normal [No Anxiety] : not feeling anxious [Not Palpable] : not palpable [No Precordial Heave] : no precordial heave was noted [Normal Rate] : normal [Normal S1] : normal S1 [Rhythm Regular] : regular [Normal S2] : normal S2 [No Gallop] : no gallop heard [No Murmur] : no murmurs heard [Right Carotid Bruit] : right carotid bruit heard [Left Carotid Bruit] : left carotid bruit heard [1+] : left 1+ [___ +] : bilateral [unfilled]U+ pretibial pitting edema [FreeTextEntry1] : No JVD [Bruit] : no bruit heard

## 2023-07-11 NOTE — HISTORY OF PRESENT ILLNESS
[FreeTextEntry1] : Adry presented to the office today for a followup evaluation. She was last seen in the office in 2023.  I more recently evaluated her in the emergency department\par \par She is now 89 years old, with a history of bladder cancer. She has a history of  hypertension, with meds discontinued when her pressure dropped. In May of 2016, she had an episode of syncope.  While hospitalized, she was observed to have nonsustained ventricular tachycardia. She was transferred for further evaluation. Nuclear stress testing and echocardiography were unremarkable. A loop recorder was implanted, and more recently explanted. She has a 4.0 cm AAA, which has been followed. She has had a myocardial infarction, for which she had PCI of the circumflex and the right coronary artery. \par \par She presented in  with chest discomfort.   She described the symptoms as being similar but not identical to the symptoms which she was experiencing with her myocardial infarction.  Nuclear stress testing revealed fixed inferior and inferolateral defects, suggestive of infarct, with an ejection fraction of 45%. We decided to try to medically manage. Unfortunately, at the end of  she presented to the hospital with unstable angina. Cardiac catheterization revealed a severe stenosis distal within a small caliber obtuse marginal, which does serve a relatively significant myocardial territory. It was felt most appropriate to try medical therapy, with PCI reserved only for failure of medical therapy.\par \par In early , she reported chest discomfort suspicious for angina. She was referred to the emergency room, and had PCI of the circumflex. Shortly afterward her   after a long illness.  She was seen in the ER with atypical chest pain after that and was discharged after several sets of enzymes.\par \par At the time of visit in , she was feeling well.  Based on an ultrasound and a noncontrast CT, we felt that her aorta had increased significantly in size, in the range of 5 cm.  I referred her to vascular surgery.  A contrast CT revealed that the aorta was only 4.6 cm.\par \par I saw her again in , and she was feeling well.  She had a great grandson recently at that time, and he was doing very well. I saw her again in  and she was feeling well.  I suggested an echocardiogram and a carotid ultrasound. These came out well.  She saw the surgeon in , and another aortic US was performed.  Her aortic dimensions increased from 4.9 to 5.1 cm.\par \par She has been feeling well from a cardiovascular perspective over the last 6 months or so.  She does not report any chest discomfort or shortness of breath suggestive of angina.  She did have an episode of chest discomfort in May 2023, for which she was evaluated in the emergency department.  Cardiac enzymes were normal, and she was discharged with the expectation that she would follow-up here.  Symptoms have not returned.  She is been compliant with all of her medications.  Her blood pressure at home has been excellent.  She has not had recent blood work.

## 2023-07-12 LAB
25(OH)D3 SERPL-MCNC: 91.1 NG/ML
ALBUMIN SERPL ELPH-MCNC: 4.5 G/DL
ALP BLD-CCNC: 82 U/L
ALT SERPL-CCNC: 10 U/L
ANION GAP SERPL CALC-SCNC: 12 MMOL/L
AST SERPL-CCNC: 19 U/L
BILIRUB SERPL-MCNC: 0.4 MG/DL
BUN SERPL-MCNC: 18 MG/DL
CALCIUM SERPL-MCNC: 9.9 MG/DL
CHLORIDE SERPL-SCNC: 104 MMOL/L
CHOLEST SERPL-MCNC: 160 MG/DL
CO2 SERPL-SCNC: 26 MMOL/L
CREAT SERPL-MCNC: 0.97 MG/DL
EGFR: 56 ML/MIN/1.73M2
ESTIMATED AVERAGE GLUCOSE: 137 MG/DL
FOLATE SERPL-MCNC: 14.4 NG/ML
GLUCOSE SERPL-MCNC: 112 MG/DL
HBA1C MFR BLD HPLC: 6.4 %
HDLC SERPL-MCNC: 67 MG/DL
LDLC SERPL CALC-MCNC: 75 MG/DL
NONHDLC SERPL-MCNC: 94 MG/DL
POTASSIUM SERPL-SCNC: 4.9 MMOL/L
PROT SERPL-MCNC: 7.1 G/DL
SODIUM SERPL-SCNC: 142 MMOL/L
TRIGL SERPL-MCNC: 100 MG/DL
TSH SERPL-ACNC: 1.83 UIU/ML
VIT B12 SERPL-MCNC: 796 PG/ML

## 2023-07-28 ENCOUNTER — APPOINTMENT (OUTPATIENT)
Dept: CARDIOLOGY | Facility: CLINIC | Age: 88
End: 2023-07-28
Payer: MEDICARE

## 2023-07-28 PROCEDURE — 93880 EXTRACRANIAL BILAT STUDY: CPT

## 2023-07-28 PROCEDURE — 93306 TTE W/DOPPLER COMPLETE: CPT

## 2023-08-01 NOTE — ED PROVIDER NOTE - ATTENDING APP SHARED VISIT CONTRIBUTION OF CARE
see mdm Rinvoq Pregnancy And Lactation Text: Based on animal studies, Rinvoq may cause embryo-fetal harm when administered to pregnant women.  The medication should not be used in pregnancy.  Breastfeeding is not recommended during treatment and for 6 days after the last dose.

## 2023-08-18 ENCOUNTER — RX RENEWAL (OUTPATIENT)
Age: 88
End: 2023-08-18

## 2023-08-20 RX ORDER — ISOSORBIDE MONONITRATE 60 MG/1
60 TABLET, EXTENDED RELEASE ORAL DAILY
Qty: 90 | Refills: 3 | Status: ACTIVE | COMMUNITY
Start: 2020-01-29

## 2023-11-07 ENCOUNTER — APPOINTMENT (OUTPATIENT)
Dept: VASCULAR SURGERY | Facility: CLINIC | Age: 88
End: 2023-11-07
Payer: MEDICARE

## 2023-11-07 LAB
ANION GAP SERPL CALC-SCNC: 12 MMOL/L
BUN SERPL-MCNC: 19 MG/DL
CALCIUM SERPL-MCNC: 9.8 MG/DL
CHLORIDE SERPL-SCNC: 104 MMOL/L
CO2 SERPL-SCNC: 27 MMOL/L
CREAT SERPL-MCNC: 0.9 MG/DL
EGFR: 61 ML/MIN/1.73M2
GLUCOSE SERPL-MCNC: 110 MG/DL
POTASSIUM SERPL-SCNC: 4.7 MMOL/L
SODIUM SERPL-SCNC: 143 MMOL/L

## 2023-11-07 PROCEDURE — 93978 VASCULAR STUDY: CPT

## 2023-11-07 PROCEDURE — 99214 OFFICE O/P EST MOD 30 MIN: CPT

## 2023-11-14 RX ORDER — PREDNISONE 20 MG/1
20 TABLET ORAL
Qty: 6 | Refills: 0 | Status: ACTIVE | COMMUNITY
Start: 2023-11-14 | End: 1900-01-01

## 2023-11-18 ENCOUNTER — OUTPATIENT (OUTPATIENT)
Dept: OUTPATIENT SERVICES | Facility: HOSPITAL | Age: 88
LOS: 1 days | End: 2023-11-18
Payer: MEDICARE

## 2023-11-18 ENCOUNTER — APPOINTMENT (OUTPATIENT)
Dept: CT IMAGING | Facility: CLINIC | Age: 88
End: 2023-11-18
Payer: MEDICARE

## 2023-11-18 DIAGNOSIS — Z98.49 CATARACT EXTRACTION STATUS, UNSPECIFIED EYE: Chronic | ICD-10-CM

## 2023-11-18 DIAGNOSIS — I71.40 ABDOMINAL AORTIC ANEURYSM, WITHOUT RUPTURE, UNSPECIFIED: ICD-10-CM

## 2023-11-18 DIAGNOSIS — Z92.89 PERSONAL HISTORY OF OTHER MEDICAL TREATMENT: Chronic | ICD-10-CM

## 2023-11-18 PROCEDURE — 75635 CT ANGIO ABDOMINAL ARTERIES: CPT | Mod: 26

## 2023-11-18 PROCEDURE — 75635 CT ANGIO ABDOMINAL ARTERIES: CPT

## 2023-11-21 ENCOUNTER — EMERGENCY (EMERGENCY)
Facility: HOSPITAL | Age: 88
LOS: 1 days | Discharge: HOME | End: 2023-11-21
Attending: STUDENT IN AN ORGANIZED HEALTH CARE EDUCATION/TRAINING PROGRAM | Admitting: STUDENT IN AN ORGANIZED HEALTH CARE EDUCATION/TRAINING PROGRAM
Payer: MEDICARE

## 2023-11-21 VITALS
HEART RATE: 60 BPM | WEIGHT: 139.99 LBS | RESPIRATION RATE: 18 BRPM | HEIGHT: 62 IN | SYSTOLIC BLOOD PRESSURE: 177 MMHG | OXYGEN SATURATION: 100 % | TEMPERATURE: 98 F | DIASTOLIC BLOOD PRESSURE: 76 MMHG

## 2023-11-21 VITALS
DIASTOLIC BLOOD PRESSURE: 79 MMHG | SYSTOLIC BLOOD PRESSURE: 159 MMHG | OXYGEN SATURATION: 100 % | RESPIRATION RATE: 17 BRPM | HEART RATE: 65 BPM | TEMPERATURE: 98 F

## 2023-11-21 DIAGNOSIS — Z92.89 PERSONAL HISTORY OF OTHER MEDICAL TREATMENT: Chronic | ICD-10-CM

## 2023-11-21 DIAGNOSIS — Z98.49 CATARACT EXTRACTION STATUS, UNSPECIFIED EYE: Chronic | ICD-10-CM

## 2023-11-21 LAB
ALBUMIN SERPL ELPH-MCNC: 3.7 G/DL — SIGNIFICANT CHANGE UP (ref 3.3–5)
ALBUMIN SERPL ELPH-MCNC: 3.7 G/DL — SIGNIFICANT CHANGE UP (ref 3.3–5)
ALP SERPL-CCNC: 81 U/L — SIGNIFICANT CHANGE UP (ref 40–120)
ALP SERPL-CCNC: 81 U/L — SIGNIFICANT CHANGE UP (ref 40–120)
ALT FLD-CCNC: 29 U/L — SIGNIFICANT CHANGE UP (ref 12–78)
ALT FLD-CCNC: 29 U/L — SIGNIFICANT CHANGE UP (ref 12–78)
ANION GAP SERPL CALC-SCNC: 7 MMOL/L — SIGNIFICANT CHANGE UP (ref 5–17)
ANION GAP SERPL CALC-SCNC: 7 MMOL/L — SIGNIFICANT CHANGE UP (ref 5–17)
APTT BLD: 23 SEC — LOW (ref 24.5–35.6)
APTT BLD: 23 SEC — LOW (ref 24.5–35.6)
AST SERPL-CCNC: 58 U/L — HIGH (ref 15–37)
AST SERPL-CCNC: 58 U/L — HIGH (ref 15–37)
BASOPHILS # BLD AUTO: 0.01 K/UL — SIGNIFICANT CHANGE UP (ref 0–0.2)
BASOPHILS # BLD AUTO: 0.01 K/UL — SIGNIFICANT CHANGE UP (ref 0–0.2)
BASOPHILS NFR BLD AUTO: 0.1 % — SIGNIFICANT CHANGE UP (ref 0–2)
BASOPHILS NFR BLD AUTO: 0.1 % — SIGNIFICANT CHANGE UP (ref 0–2)
BILIRUB SERPL-MCNC: 0.4 MG/DL — SIGNIFICANT CHANGE UP (ref 0.2–1.2)
BILIRUB SERPL-MCNC: 0.4 MG/DL — SIGNIFICANT CHANGE UP (ref 0.2–1.2)
BUN SERPL-MCNC: 25 MG/DL — HIGH (ref 7–23)
BUN SERPL-MCNC: 25 MG/DL — HIGH (ref 7–23)
CALCIUM SERPL-MCNC: 9 MG/DL — SIGNIFICANT CHANGE UP (ref 8.5–10.1)
CALCIUM SERPL-MCNC: 9 MG/DL — SIGNIFICANT CHANGE UP (ref 8.5–10.1)
CHLORIDE SERPL-SCNC: 106 MMOL/L — SIGNIFICANT CHANGE UP (ref 96–108)
CHLORIDE SERPL-SCNC: 106 MMOL/L — SIGNIFICANT CHANGE UP (ref 96–108)
CO2 SERPL-SCNC: 28 MMOL/L — SIGNIFICANT CHANGE UP (ref 22–31)
CO2 SERPL-SCNC: 28 MMOL/L — SIGNIFICANT CHANGE UP (ref 22–31)
CREAT SERPL-MCNC: 1 MG/DL — SIGNIFICANT CHANGE UP (ref 0.5–1.3)
CREAT SERPL-MCNC: 1 MG/DL — SIGNIFICANT CHANGE UP (ref 0.5–1.3)
D DIMER BLD IA.RAPID-MCNC: 856 NG/ML DDU — HIGH
D DIMER BLD IA.RAPID-MCNC: 856 NG/ML DDU — HIGH
EGFR: 54 ML/MIN/1.73M2 — LOW
EGFR: 54 ML/MIN/1.73M2 — LOW
EOSINOPHIL # BLD AUTO: 0.05 K/UL — SIGNIFICANT CHANGE UP (ref 0–0.5)
EOSINOPHIL # BLD AUTO: 0.05 K/UL — SIGNIFICANT CHANGE UP (ref 0–0.5)
EOSINOPHIL NFR BLD AUTO: 0.4 % — SIGNIFICANT CHANGE UP (ref 0–6)
EOSINOPHIL NFR BLD AUTO: 0.4 % — SIGNIFICANT CHANGE UP (ref 0–6)
GLUCOSE SERPL-MCNC: 135 MG/DL — HIGH (ref 70–99)
GLUCOSE SERPL-MCNC: 135 MG/DL — HIGH (ref 70–99)
HCT VFR BLD CALC: 42.1 % — SIGNIFICANT CHANGE UP (ref 34.5–45)
HCT VFR BLD CALC: 42.1 % — SIGNIFICANT CHANGE UP (ref 34.5–45)
HGB BLD-MCNC: 13.6 G/DL — SIGNIFICANT CHANGE UP (ref 11.5–15.5)
HGB BLD-MCNC: 13.6 G/DL — SIGNIFICANT CHANGE UP (ref 11.5–15.5)
IMM GRANULOCYTES NFR BLD AUTO: 0.7 % — SIGNIFICANT CHANGE UP (ref 0–0.9)
IMM GRANULOCYTES NFR BLD AUTO: 0.7 % — SIGNIFICANT CHANGE UP (ref 0–0.9)
INR BLD: 1.4 RATIO — HIGH (ref 0.85–1.18)
INR BLD: 1.4 RATIO — HIGH (ref 0.85–1.18)
LIDOCAIN IGE QN: 113 U/L — HIGH (ref 13–75)
LIDOCAIN IGE QN: 113 U/L — HIGH (ref 13–75)
LYMPHOCYTES # BLD AUTO: 0.88 K/UL — LOW (ref 1–3.3)
LYMPHOCYTES # BLD AUTO: 0.88 K/UL — LOW (ref 1–3.3)
LYMPHOCYTES # BLD AUTO: 7.6 % — LOW (ref 13–44)
LYMPHOCYTES # BLD AUTO: 7.6 % — LOW (ref 13–44)
MAGNESIUM SERPL-MCNC: 2.2 MG/DL — SIGNIFICANT CHANGE UP (ref 1.6–2.6)
MAGNESIUM SERPL-MCNC: 2.2 MG/DL — SIGNIFICANT CHANGE UP (ref 1.6–2.6)
MCHC RBC-ENTMCNC: 30.1 PG — SIGNIFICANT CHANGE UP (ref 27–34)
MCHC RBC-ENTMCNC: 30.1 PG — SIGNIFICANT CHANGE UP (ref 27–34)
MCHC RBC-ENTMCNC: 32.3 GM/DL — SIGNIFICANT CHANGE UP (ref 32–36)
MCHC RBC-ENTMCNC: 32.3 GM/DL — SIGNIFICANT CHANGE UP (ref 32–36)
MCV RBC AUTO: 93.1 FL — SIGNIFICANT CHANGE UP (ref 80–100)
MCV RBC AUTO: 93.1 FL — SIGNIFICANT CHANGE UP (ref 80–100)
MONOCYTES # BLD AUTO: 0.77 K/UL — SIGNIFICANT CHANGE UP (ref 0–0.9)
MONOCYTES # BLD AUTO: 0.77 K/UL — SIGNIFICANT CHANGE UP (ref 0–0.9)
MONOCYTES NFR BLD AUTO: 6.7 % — SIGNIFICANT CHANGE UP (ref 2–14)
MONOCYTES NFR BLD AUTO: 6.7 % — SIGNIFICANT CHANGE UP (ref 2–14)
NEUTROPHILS # BLD AUTO: 9.78 K/UL — HIGH (ref 1.8–7.4)
NEUTROPHILS # BLD AUTO: 9.78 K/UL — HIGH (ref 1.8–7.4)
NEUTROPHILS NFR BLD AUTO: 84.5 % — HIGH (ref 43–77)
NEUTROPHILS NFR BLD AUTO: 84.5 % — HIGH (ref 43–77)
NRBC # BLD: 0 /100 WBCS — SIGNIFICANT CHANGE UP (ref 0–0)
NRBC # BLD: 0 /100 WBCS — SIGNIFICANT CHANGE UP (ref 0–0)
NT-PROBNP SERPL-SCNC: 1907 PG/ML — HIGH (ref 0–450)
NT-PROBNP SERPL-SCNC: 1907 PG/ML — HIGH (ref 0–450)
PLATELET # BLD AUTO: 162 K/UL — SIGNIFICANT CHANGE UP (ref 150–400)
PLATELET # BLD AUTO: 162 K/UL — SIGNIFICANT CHANGE UP (ref 150–400)
POTASSIUM SERPL-MCNC: 5 MMOL/L — SIGNIFICANT CHANGE UP (ref 3.5–5.3)
POTASSIUM SERPL-MCNC: 5 MMOL/L — SIGNIFICANT CHANGE UP (ref 3.5–5.3)
POTASSIUM SERPL-SCNC: 5 MMOL/L — SIGNIFICANT CHANGE UP (ref 3.5–5.3)
POTASSIUM SERPL-SCNC: 5 MMOL/L — SIGNIFICANT CHANGE UP (ref 3.5–5.3)
PROT SERPL-MCNC: 7.2 G/DL — SIGNIFICANT CHANGE UP (ref 6–8.3)
PROT SERPL-MCNC: 7.2 G/DL — SIGNIFICANT CHANGE UP (ref 6–8.3)
PROTHROM AB SERPL-ACNC: 16.2 SEC — HIGH (ref 9.5–13)
PROTHROM AB SERPL-ACNC: 16.2 SEC — HIGH (ref 9.5–13)
RBC # BLD: 4.52 M/UL — SIGNIFICANT CHANGE UP (ref 3.8–5.2)
RBC # BLD: 4.52 M/UL — SIGNIFICANT CHANGE UP (ref 3.8–5.2)
RBC # FLD: 13.6 % — SIGNIFICANT CHANGE UP (ref 10.3–14.5)
RBC # FLD: 13.6 % — SIGNIFICANT CHANGE UP (ref 10.3–14.5)
SODIUM SERPL-SCNC: 141 MMOL/L — SIGNIFICANT CHANGE UP (ref 135–145)
SODIUM SERPL-SCNC: 141 MMOL/L — SIGNIFICANT CHANGE UP (ref 135–145)
TROPONIN I, HIGH SENSITIVITY RESULT: 23.6 NG/L — SIGNIFICANT CHANGE UP
TROPONIN I, HIGH SENSITIVITY RESULT: 23.6 NG/L — SIGNIFICANT CHANGE UP
TROPONIN I, HIGH SENSITIVITY RESULT: 24.2 NG/L — SIGNIFICANT CHANGE UP
TROPONIN I, HIGH SENSITIVITY RESULT: 24.2 NG/L — SIGNIFICANT CHANGE UP
WBC # BLD: 11.57 K/UL — HIGH (ref 3.8–10.5)
WBC # BLD: 11.57 K/UL — HIGH (ref 3.8–10.5)
WBC # FLD AUTO: 11.57 K/UL — HIGH (ref 3.8–10.5)
WBC # FLD AUTO: 11.57 K/UL — HIGH (ref 3.8–10.5)

## 2023-11-21 PROCEDURE — 71250 CT THORAX DX C-: CPT | Mod: 26,MA

## 2023-11-21 PROCEDURE — 84484 ASSAY OF TROPONIN QUANT: CPT

## 2023-11-21 PROCEDURE — 99285 EMERGENCY DEPT VISIT HI MDM: CPT | Mod: 25

## 2023-11-21 PROCEDURE — 93010 ELECTROCARDIOGRAM REPORT: CPT

## 2023-11-21 PROCEDURE — 80053 COMPREHEN METABOLIC PANEL: CPT

## 2023-11-21 PROCEDURE — 85730 THROMBOPLASTIN TIME PARTIAL: CPT

## 2023-11-21 PROCEDURE — 83880 ASSAY OF NATRIURETIC PEPTIDE: CPT

## 2023-11-21 PROCEDURE — 71045 X-RAY EXAM CHEST 1 VIEW: CPT | Mod: 26

## 2023-11-21 PROCEDURE — 74176 CT ABD & PELVIS W/O CONTRAST: CPT | Mod: 26,MA

## 2023-11-21 PROCEDURE — 74176 CT ABD & PELVIS W/O CONTRAST: CPT | Mod: MA

## 2023-11-21 PROCEDURE — 85610 PROTHROMBIN TIME: CPT

## 2023-11-21 PROCEDURE — 83690 ASSAY OF LIPASE: CPT

## 2023-11-21 PROCEDURE — 83735 ASSAY OF MAGNESIUM: CPT

## 2023-11-21 PROCEDURE — 93005 ELECTROCARDIOGRAM TRACING: CPT

## 2023-11-21 PROCEDURE — 36415 COLL VENOUS BLD VENIPUNCTURE: CPT

## 2023-11-21 PROCEDURE — 71250 CT THORAX DX C-: CPT | Mod: MA

## 2023-11-21 PROCEDURE — 85025 COMPLETE CBC W/AUTO DIFF WBC: CPT

## 2023-11-21 PROCEDURE — 99285 EMERGENCY DEPT VISIT HI MDM: CPT

## 2023-11-21 PROCEDURE — 85379 FIBRIN DEGRADATION QUANT: CPT

## 2023-11-21 PROCEDURE — 71045 X-RAY EXAM CHEST 1 VIEW: CPT

## 2023-11-21 RX ORDER — FAMOTIDINE 10 MG/ML
20 INJECTION INTRAVENOUS ONCE
Refills: 0 | Status: COMPLETED | OUTPATIENT
Start: 2023-11-21 | End: 2023-11-21

## 2023-11-21 NOTE — ED PROVIDER NOTE - CLINICAL SUMMARY MEDICAL DECISION MAKING FREE TEXT BOX
Here with known expanding infrarenal AAA complaining of epigastric discomfort, now feels well..  Check labs, CT Noncon recommended by surgeon. re-eval for disposition.

## 2023-11-21 NOTE — ED PROVIDER NOTE - NSICDXFAMILYHX_GEN_ALL_CORE_FT
FAMILY HISTORY:  Family history of diabetes mellitus  Family history of hypertension    Sibling  Still living? No  Family history of MI (myocardial infarction), Age at diagnosis: Age Unknown

## 2023-11-21 NOTE — ED PROVIDER NOTE - CARE PROVIDER_API CALL
Fran Pedraza  Vascular Surgery  [address], [address]  [place] [address]  Phone: [phone]  Fax: [phone]  Established Patient  Follow Up Time: 4-6 Days    Miguel Riley  Cardiovascular Disease  [address] [place] [address]  Phone: [phone]  Fax: [phone]  Established Patient  Follow Up Time: 4-6 Days

## 2023-11-21 NOTE — ED ADULT NURSE NOTE - BREATH SOUNDS, MLM
Spoke with patient  Instructed patient, per Raya Phan, to call closer to her October jury duty date  Per patient, she is unsure if she needs to have a letter now  Patient to call regarding jury duty and if needed call our office or her PCP office if a letter is needed sooner, per jury duty regulations  Clear

## 2023-11-21 NOTE — ED PROVIDER NOTE - PATIENT PORTAL LINK FT
You can access the FollowMyHealth Patient Portal offered by [facility] by registering at the following website: [email or web address]. By joining [facility]’s FollowMyHealth portal, you will also be able to view your health information using other applications (apps) compatible with our system.

## 2023-11-21 NOTE — ED PROVIDER NOTE - CARE PROVIDERS DIRECT ADDRESSES
[email or web address]

## 2023-11-21 NOTE — ED PROVIDER NOTE - OBJECTIVE STATEMENT
89-year-old female history of bladder cancer, hypertension, AAA, MI status post PCI, on aspirin, Here for evaluation of AAA.  Patient this week and noted to have enlargement of known AAA, plan for surgery in the near future.  Today felt like she was having a hot flash and developed some upper abdominal discomfort.  She drank some Sprite and took a nitro and started to feel better.  No dizziness, no shortness of breath, no chest pain.  Family called cardiothoracic surgeon who recommended she come to the hospital for evaluation.

## 2023-11-21 NOTE — ED ADULT TRIAGE NOTE - CHIEF COMPLAINT QUOTE
Patient arrived via EMS, sent to ER by Dr Fran Pedraza for confirmed AAA on CT scan this week. Patient denies any complaints upon triage. no CP, abdominal pain, back pain.

## 2023-11-21 NOTE — ED PROVIDER NOTE - PROVIDER TOKENS
PROVIDER:[TOKEN:[[id number]],FOLLOWUP:[4-6 Days],ESTABLISHEDPATIENT:[T]],PROVIDER:[TOKEN:[[id number]],FOLLOWUP:[4-6 Days],ESTABLISHEDPATIENT:[T]]

## 2023-11-21 NOTE — ED PROVIDER NOTE - PROGRESS NOTE DETAILS
Patient feels well.  Results discussed with surgeon, agrees with discharge.  Patient and family agreeable to discharge. D-dimer likely related to known AAA- signs and symptoms not consistent with PE.

## 2023-11-21 NOTE — ED PROVIDER NOTE - PHYSICAL EXAMINATION
Vital signs as available reviewed.  General:  No acute distress.  Head:  Normocephalic, atraumatic.  Eyes:  Conjunctiva pink, no icterus.  Cardiovascular:  Regular rate, + murmur. 2+ Equal bilateral pulses brachial arteries and DPs.  Respiratory:  Clear to auscultation, good air entry bilaterally.  Abdomen:  Soft, non-tender. + Pulsatile abdominal mass  Musculoskeletal:  No obvious deformity.  Neurologic: Alert and oriented, moving all extremities.  Skin:  Warm and dry.

## 2023-11-21 NOTE — ED PROVIDER NOTE - NSFOLLOWUPINSTRUCTIONS_ED_ALL_ED_FT
Please follow up with your Primary Care Physician and any specialists as discussed- Dr Pedraza, Dr Riley.  Please take your medications as prescribed and or instructed.  If your symptoms persist or worsen, please seek care. Either return to the Emergency Department, go to urgent care or see your primary care doctor.  Please refer to general information and instructions attached or below:    Abdominal Aortic Aneurysm  Body outline showing the heart and aorta with a close-up of a bulge, or aneurysm, in the abdominal aorta.   An aneurysm is a bulge in an artery. It happens when blood pushes against a weak or damaged artery wall. An abdominal aortic aneurysm (AAA) is an aneurysm in the lower part of the aorta. The aorta is the main artery of the body. It supplies blood from the heart to the rest of the body.    Most aneurysms do not cause symptoms. Some can cause problems. An AAA can cause two serious problems:  It can grow and then burst (rupture).  It can cause blood to flow between the layers of the wall of the aorta through a tear (aortic dissection).  These problems are medical emergencies. They can cause bleeding inside the body. They should be treated right away.    What are the causes?  The exact cause of this condition is not known.    What increases the risk?  You may be more likely to develop an AAA if:  You are male and 60 years of age or older.  You are .  You use or have used nicotine or tobacco products.  You have a family history of aneurysms.  You have an injury or trauma to your aorta.  You are obese.  You have:  Arteriosclerosis. This is when your arteries harden.  Arteritis. This is inflammation of the walls of an artery.  Certain genetic conditions.  Infectious aortitis. This is an infection in the wall of your aorta caused by bacteria.  High cholesterol.  High blood pressure (hypertension).  What are the signs or symptoms?  Symptoms depend on how big the aneurysm is and how fast it is growing. Most grow slowly and do not cause symptoms.    In some cases, you may have:  Severe pain in your abdomen, side, or lower back.  A feeling of fullness after you eat only a little bit of food.  A throbbing lump in your abdomen.  Painful feet or toes. You may also have discolored skin or sores on your feet or toes.  Constipation or trouble peeing (urinating).  If your AAA bursts, you may:  Feel sudden, severe pain in your abdomen, side, or back.  Have nausea or vomiting.  Feel light-headed or faint.  How is this diagnosed?  This condition may be diagnosed with a physical exam to check for throbbing and listen to blood flow in your abdomen.    You may also have tests, such as:  Ultrasound.  X-rays.  CT scan.  MRI.  Angiogram. This test checks your arteries for damage or blockage.  Because most AAAs that have not burst do not cause symptoms, they are often found during exams for other conditions.    How is this treated?  Treatment depends on:  The size of your aneurysm.  How fast your aneurysm is growing.  Your age.  Risk factors for a burst AAA.  If your aneurysm is smaller than 2 inches (5 cm), your health care provider may:  Keep an eye on it to see if it gets bigger. You may need to have an ultrasound every 6–12 months, every year, or every few years.  Give you medicines to control blood pressure, treat pain, or fight infection.  If your aneurysm is larger than 2 inches (5 cm), you may need surgery.    Follow these instructions at home:  Eating and drinking    A plate with examples of foods in a healthy diet.  Eat a heart-healthy diet. This includes lots of fresh fruits and vegetables, whole grains, low-fat (lean) protein, and low-fat dairy products.  Avoid foods that are high in saturated fat and cholesterol. These include red meat and some dairy products.  Lifestyle    A person riding a bicycle while wearing a safety helmet.  Do not use any products that contain nicotine or tobacco. These products include cigarettes, chewing tobacco, and vaping devices, such as e-cigarettes. If you need help quitting, ask your provider.  Check your blood pressure often. Follow instructions on how to keep it within normal limits.  Have your cholesterol levels checked often. Follow instructions on how to keep levels within normal limits.  Stay active. Exercise on a regular basis. Talk with your provider about how often to exercise and which types of exercise are safe for you.  Maintain a healthy weight.  Alcohol use    Do not drink alcohol if:  Your provider tells you not to drink.  You are pregnant, may be pregnant, or plan to become pregnant.  If you drink alcohol:  Limit how much you have to:  0–1 drink a day if you are female.  0–2 drinks a day if you are male.  Know how much alcohol is in your drink. In the [place] one drink is one 12 oz bottle of beer (355 mL), one 5 oz glass of wine (148 mL), or one 1½ oz glass of hard liquor (44 mL).  General instructions    Take over-the-counter and prescription medicines only as told by your provider.  You may have to avoid lifting. Ask your provider how much you can safely lift.  If you can, learn your family's health history.  Keep all follow-up visits. Your provider will need to watch the size of your aneurysm and how fast it is growing.  Where to find more information  American Heart Association: heart.org  Contact a health care provider if:  You have pain in your abdomen, side, or back.  You have a throbbing mass in your abdomen.  Your heart beats fast when you stand.  You have nausea or vomiting.  You are constipated or have trouble peeing.  You have a fever.  Get help right away if:  You have sudden, severe pain in your abdomen, side, or back.  You feel light-headed, or you faint.  You have sweaty, clammy skin.  You are short of breath.  These symptoms may be an emergency. Get help right away. Call 911.  Do not wait to see if the symptoms will go away.  Do not drive yourself to the hospital.  This information is not intended to replace advice given to you by your health care provider. Make sure you discuss any questions you have with your health care provider.

## 2023-11-21 NOTE — ED ADULT NURSE NOTE - OBJECTIVE STATEMENT
pt a/o x 4 with a calm affect states that her PMD sent her to ED for +AAA seen on CT previously this week.  patient denies any pain at this time.  pending CT and initial lab results

## 2023-11-28 ENCOUNTER — APPOINTMENT (OUTPATIENT)
Dept: VASCULAR SURGERY | Facility: CLINIC | Age: 88
End: 2023-11-28

## 2023-12-01 ENCOUNTER — APPOINTMENT (OUTPATIENT)
Dept: CARDIOLOGY | Facility: CLINIC | Age: 88
End: 2023-12-01
Payer: MEDICARE

## 2023-12-01 ENCOUNTER — NON-APPOINTMENT (OUTPATIENT)
Age: 88
End: 2023-12-01

## 2023-12-01 VITALS
WEIGHT: 144 LBS | SYSTOLIC BLOOD PRESSURE: 169 MMHG | DIASTOLIC BLOOD PRESSURE: 63 MMHG | BODY MASS INDEX: 25.52 KG/M2 | OXYGEN SATURATION: 98 % | HEART RATE: 72 BPM | HEIGHT: 63 IN

## 2023-12-01 PROCEDURE — 99214 OFFICE O/P EST MOD 30 MIN: CPT

## 2023-12-01 PROCEDURE — 93000 ELECTROCARDIOGRAM COMPLETE: CPT

## 2023-12-13 ENCOUNTER — OUTPATIENT (OUTPATIENT)
Dept: OUTPATIENT SERVICES | Facility: HOSPITAL | Age: 88
LOS: 1 days | End: 2023-12-13
Payer: MEDICARE

## 2023-12-13 VITALS
DIASTOLIC BLOOD PRESSURE: 78 MMHG | WEIGHT: 141.1 LBS | RESPIRATION RATE: 20 BRPM | TEMPERATURE: 98 F | SYSTOLIC BLOOD PRESSURE: 155 MMHG | HEART RATE: 78 BPM | OXYGEN SATURATION: 98 % | HEIGHT: 61.61 IN

## 2023-12-13 DIAGNOSIS — I10 ESSENTIAL (PRIMARY) HYPERTENSION: ICD-10-CM

## 2023-12-13 DIAGNOSIS — Z98.49 CATARACT EXTRACTION STATUS, UNSPECIFIED EYE: Chronic | ICD-10-CM

## 2023-12-13 DIAGNOSIS — Z98.890 OTHER SPECIFIED POSTPROCEDURAL STATES: Chronic | ICD-10-CM

## 2023-12-13 DIAGNOSIS — I71.40 ABDOMINAL AORTIC ANEURYSM, WITHOUT RUPTURE, UNSPECIFIED: ICD-10-CM

## 2023-12-13 DIAGNOSIS — I71.4 ABDOMINAL AORTIC ANEURYSM, WITHOUT RUPTURE: ICD-10-CM

## 2023-12-13 DIAGNOSIS — Z95.5 PRESENCE OF CORONARY ANGIOPLASTY IMPLANT AND GRAFT: ICD-10-CM

## 2023-12-13 DIAGNOSIS — Z92.89 PERSONAL HISTORY OF OTHER MEDICAL TREATMENT: Chronic | ICD-10-CM

## 2023-12-13 DIAGNOSIS — Z29.9 ENCOUNTER FOR PROPHYLACTIC MEASURES, UNSPECIFIED: ICD-10-CM

## 2023-12-13 DIAGNOSIS — Z95.5 PRESENCE OF CORONARY ANGIOPLASTY IMPLANT AND GRAFT: Chronic | ICD-10-CM

## 2023-12-13 LAB
ANION GAP SERPL CALC-SCNC: 10 MMOL/L — SIGNIFICANT CHANGE UP (ref 5–17)
ANION GAP SERPL CALC-SCNC: 10 MMOL/L — SIGNIFICANT CHANGE UP (ref 5–17)
BLD GP AB SCN SERPL QL: NEGATIVE — SIGNIFICANT CHANGE UP
BLD GP AB SCN SERPL QL: NEGATIVE — SIGNIFICANT CHANGE UP
BUN SERPL-MCNC: 25 MG/DL — HIGH (ref 7–23)
BUN SERPL-MCNC: 25 MG/DL — HIGH (ref 7–23)
CALCIUM SERPL-MCNC: 9.4 MG/DL — SIGNIFICANT CHANGE UP (ref 8.4–10.5)
CALCIUM SERPL-MCNC: 9.4 MG/DL — SIGNIFICANT CHANGE UP (ref 8.4–10.5)
CHLORIDE SERPL-SCNC: 104 MMOL/L — SIGNIFICANT CHANGE UP (ref 96–108)
CHLORIDE SERPL-SCNC: 104 MMOL/L — SIGNIFICANT CHANGE UP (ref 96–108)
CO2 SERPL-SCNC: 27 MMOL/L — SIGNIFICANT CHANGE UP (ref 22–31)
CO2 SERPL-SCNC: 27 MMOL/L — SIGNIFICANT CHANGE UP (ref 22–31)
CREAT SERPL-MCNC: 0.82 MG/DL — SIGNIFICANT CHANGE UP (ref 0.5–1.3)
CREAT SERPL-MCNC: 0.82 MG/DL — SIGNIFICANT CHANGE UP (ref 0.5–1.3)
EGFR: 68 ML/MIN/1.73M2 — SIGNIFICANT CHANGE UP
EGFR: 68 ML/MIN/1.73M2 — SIGNIFICANT CHANGE UP
GLUCOSE SERPL-MCNC: 105 MG/DL — HIGH (ref 70–99)
GLUCOSE SERPL-MCNC: 105 MG/DL — HIGH (ref 70–99)
HCT VFR BLD CALC: 38.9 % — SIGNIFICANT CHANGE UP (ref 34.5–45)
HCT VFR BLD CALC: 38.9 % — SIGNIFICANT CHANGE UP (ref 34.5–45)
HGB BLD-MCNC: 12.6 G/DL — SIGNIFICANT CHANGE UP (ref 11.5–15.5)
HGB BLD-MCNC: 12.6 G/DL — SIGNIFICANT CHANGE UP (ref 11.5–15.5)
MCHC RBC-ENTMCNC: 30.4 PG — SIGNIFICANT CHANGE UP (ref 27–34)
MCHC RBC-ENTMCNC: 30.4 PG — SIGNIFICANT CHANGE UP (ref 27–34)
MCHC RBC-ENTMCNC: 32.4 GM/DL — SIGNIFICANT CHANGE UP (ref 32–36)
MCHC RBC-ENTMCNC: 32.4 GM/DL — SIGNIFICANT CHANGE UP (ref 32–36)
MCV RBC AUTO: 93.7 FL — SIGNIFICANT CHANGE UP (ref 80–100)
MCV RBC AUTO: 93.7 FL — SIGNIFICANT CHANGE UP (ref 80–100)
NRBC # BLD: 0 /100 WBCS — SIGNIFICANT CHANGE UP (ref 0–0)
NRBC # BLD: 0 /100 WBCS — SIGNIFICANT CHANGE UP (ref 0–0)
PLATELET # BLD AUTO: 170 K/UL — SIGNIFICANT CHANGE UP (ref 150–400)
PLATELET # BLD AUTO: 170 K/UL — SIGNIFICANT CHANGE UP (ref 150–400)
POTASSIUM SERPL-MCNC: 3.9 MMOL/L — SIGNIFICANT CHANGE UP (ref 3.5–5.3)
POTASSIUM SERPL-MCNC: 3.9 MMOL/L — SIGNIFICANT CHANGE UP (ref 3.5–5.3)
POTASSIUM SERPL-SCNC: 3.9 MMOL/L — SIGNIFICANT CHANGE UP (ref 3.5–5.3)
POTASSIUM SERPL-SCNC: 3.9 MMOL/L — SIGNIFICANT CHANGE UP (ref 3.5–5.3)
RBC # BLD: 4.15 M/UL — SIGNIFICANT CHANGE UP (ref 3.8–5.2)
RBC # BLD: 4.15 M/UL — SIGNIFICANT CHANGE UP (ref 3.8–5.2)
RBC # FLD: 13.8 % — SIGNIFICANT CHANGE UP (ref 10.3–14.5)
RBC # FLD: 13.8 % — SIGNIFICANT CHANGE UP (ref 10.3–14.5)
RH IG SCN BLD-IMP: NEGATIVE — SIGNIFICANT CHANGE UP
RH IG SCN BLD-IMP: NEGATIVE — SIGNIFICANT CHANGE UP
SODIUM SERPL-SCNC: 141 MMOL/L — SIGNIFICANT CHANGE UP (ref 135–145)
SODIUM SERPL-SCNC: 141 MMOL/L — SIGNIFICANT CHANGE UP (ref 135–145)
WBC # BLD: 6.46 K/UL — SIGNIFICANT CHANGE UP (ref 3.8–10.5)
WBC # BLD: 6.46 K/UL — SIGNIFICANT CHANGE UP (ref 3.8–10.5)
WBC # FLD AUTO: 6.46 K/UL — SIGNIFICANT CHANGE UP (ref 3.8–10.5)
WBC # FLD AUTO: 6.46 K/UL — SIGNIFICANT CHANGE UP (ref 3.8–10.5)

## 2023-12-13 PROCEDURE — 86901 BLOOD TYPING SEROLOGIC RH(D): CPT

## 2023-12-13 PROCEDURE — 80048 BASIC METABOLIC PNL TOTAL CA: CPT

## 2023-12-13 PROCEDURE — 86850 RBC ANTIBODY SCREEN: CPT

## 2023-12-13 PROCEDURE — 36415 COLL VENOUS BLD VENIPUNCTURE: CPT

## 2023-12-13 PROCEDURE — 85027 COMPLETE CBC AUTOMATED: CPT

## 2023-12-13 PROCEDURE — 86900 BLOOD TYPING SEROLOGIC ABO: CPT

## 2023-12-13 PROCEDURE — G0463: CPT

## 2023-12-13 RX ORDER — MULTIVIT-MIN/FERROUS GLUCONATE 9 MG/15 ML
1 LIQUID (ML) ORAL
Qty: 0 | Refills: 0 | DISCHARGE

## 2023-12-13 RX ORDER — LIDOCAINE HCL 20 MG/ML
0.2 VIAL (ML) INJECTION ONCE
Refills: 0 | Status: DISCONTINUED | OUTPATIENT
Start: 2024-01-22 | End: 2024-01-23

## 2023-12-13 RX ORDER — EVOLOCUMAB 140 MG/ML
0 INJECTION, SOLUTION SUBCUTANEOUS
Qty: 0 | Refills: 0 | DISCHARGE

## 2023-12-13 RX ORDER — NITROGLYCERIN 6.5 MG
1 CAPSULE, EXTENDED RELEASE ORAL
Qty: 0 | Refills: 0 | DISCHARGE

## 2023-12-13 RX ORDER — CHLORHEXIDINE GLUCONATE 213 G/1000ML
1 SOLUTION TOPICAL ONCE
Refills: 0 | Status: DISCONTINUED | OUTPATIENT
Start: 2024-01-22 | End: 2024-01-23

## 2023-12-13 RX ORDER — ASCORBIC ACID 60 MG
1 TABLET,CHEWABLE ORAL
Qty: 0 | Refills: 0 | DISCHARGE

## 2023-12-13 RX ORDER — PREGABALIN 225 MG/1
1 CAPSULE ORAL
Qty: 0 | Refills: 0 | DISCHARGE

## 2023-12-13 RX ORDER — MAGNESIUM OXIDE 400 MG ORAL TABLET 241.3 MG
1 TABLET ORAL
Qty: 0 | Refills: 0 | DISCHARGE

## 2023-12-13 RX ORDER — CHOLECALCIFEROL (VITAMIN D3) 125 MCG
1 CAPSULE ORAL
Qty: 0 | Refills: 0 | DISCHARGE

## 2023-12-13 RX ORDER — SELENIOUS ACID 40 UG/ML
1 INJECTION, SOLUTION INTRAVENOUS
Qty: 0 | Refills: 0 | DISCHARGE

## 2023-12-13 NOTE — H&P PST ADULT - MUSCULOSKELETAL
negative left ankle pain due to arthritis/normal/ROM intact/normal gait/strength 5/5 bilateral upper extremities/strength 5/5 bilateral lower extremities

## 2023-12-13 NOTE — H&P PST ADULT - NSICDXPASTMEDICALHX_GEN_ALL_CORE_FT
PAST MEDICAL HISTORY:  AAA (abdominal aortic aneurysm)     Arthritis of left ankle     Back pain, chronic occasional lower back pain    Benign paroxysmal vertigo of right ear history of right ear abscess    Bladder cancer dx 2012 treated with BCG    Coronary artery disease of native artery of native heart with stable angina pectoris     H/O gastritis     H/O syncope     History of loop recorder placed by Dr. DHARMESH Ibarra. Removed 2019    History of trigger finger     [place] (hard of hearing)     HTN (hypertension)     Neuropathy, arm, right     Osteoarthritis      PAST MEDICAL HISTORY:  AAA (abdominal aortic aneurysm)     Arthritis of left ankle     Back pain, chronic occasional lower back pain    Benign paroxysmal vertigo of right ear history of right ear abscess    Bladder cancer dx 2012 treated with BCG    Coronary artery disease of native artery of native heart with stable angina pectoris     H/O gastritis     H/O syncope     History of loop recorder placed by Dr. DHARMESH Ibarra. Removed 2019    History of trigger finger     [place] (hard of hearing)     HTN (hypertension)     Neuropathy, arm, right     Osteoarthritis      PAST MEDICAL HISTORY:  AAA (abdominal aortic aneurysm)     Arthritis of left ankle     Back pain, chronic occasional lower back pain    Benign paroxysmal vertigo of right ear history of right ear abscess    Bladder cancer dx 2012 treated with BCG    Coronary artery disease of native artery of native heart with stable angina pectoris     H/O gastritis     H/O syncope     History of loop recorder placed by Dr. DHARMESH Ibarra. Removed 2019    History of trigger finger     [place] (hard of hearing)     HTN (hypertension)     Moderate aortic stenosis     Neuropathy, arm, right     Osteoarthritis      PAST MEDICAL HISTORY:  AAA (abdominal aortic aneurysm)     Arthritis of left ankle     Back pain, chronic occasional lower back pain    Benign paroxysmal vertigo of right ear history of right ear abscess    Bladder cancer dx 2012 treated with BCG    Coronary artery disease of native artery of native heart with stable angina pectoris     H/O gastritis     H/O syncope     History of loop recorder placed by Dr. DHARMESH Ibarra. Removed 2019    History of trigger finger     [place] (hard of hearing)     HTN (hypertension)     Moderate aortic stenosis     Neuropathy, arm, right     Osteoarthritis

## 2023-12-13 NOTE — H&P PST ADULT - ASSESSMENT
Airway:  normal    Mallampati-       Dental: Patient denies loose teeth, Partial upper dentures    Activity : walk in house, exercise legs , walk stairs, cooking , cleaning   dasi mets : 7 dasi mets     NYAI VTE 2.0 SCORE [CLOT updated 2019]    AGE RELATED RISK FACTORS                                                       MOBILITY RELATED FACTORS  [ ] Age 41-60 years                                            (1 Point)                    [ ] Bed rest                                                        (1 Point)  [ ] Age: 61-74 years                                           (2 Points)                  [ ] Plaster cast                                                   (2 Points)  [ ] Age= 75 years                                              (3 Points)                    [ ] Bed bound for more than 72 hours                 (2 Points)    DISEASE RELATED RISK FACTORS                                               GENDER SPECIFIC FACTORS  [ ] Edema in the lower extremities                       (1 Point)              [ ] Pregnancy                                                     (1 Point)  [ ] Varicose veins                                               (1 Point)                     [ ] Post-partum < 6 weeks                                   (1 Point)             [ ] BMI > 25 Kg/m2                                            (1 Point)                     [ ] Hormonal therapy  or oral contraception          (1 Point)                 [ ] Sepsis (in the previous month)                        (1 Point)               [ ] History of pregnancy complications                 (1 point)  [ ] Pneumonia or serious lung disease                                               [ ] Unexplained or recurrent                     (1 Point)           (in the previous month)                               (1 Point)  [ ] Abnormal pulmonary function test                     (1 Point)                 SURGERY RELATED RISK FACTORS  [ ] Acute myocardial infarction                              (1 Point)               [ ]  Section                                             (1 Point)  [ ] Congestive heart failure (in the previous month)  (1 Point)      [ ] Minor surgery                                                  (1 Point)   [ ] Inflammatory bowel disease                             (1 Point)               [ ] Arthroscopic surgery                                        (2 Points)  [ ] Central venous access                                      (2 Points)                [ ] General surgery lasting more than 45 minutes (2 points)  [ ] Malignancy- Present or previous                   (2 Points)                [ ] Elective arthroplasty                                         (5 points)    [ ] Stroke (in the previous month)                          (5 Points)                                                                                                                                                           HEMATOLOGY RELATED FACTORS                                                 TRAUMA RELATED RISK FACTORS  [ ] Prior episodes of VTE                                     (3 Points)                [ ] Fracture of the hip, pelvis, or leg                       (5 Points)  [ ] Positive family history for VTE                         (3 Points)             [ ] Acute spinal cord injury (in the previous month)  (5 Points)  [ ] Prothrombin 36011 A                                     (3 Points)               [ ] Paralysis  (less than 1 month)                             (5 Points)  [ ] Factor V Leiden                                             (3 Points)                  [ ] Multiple Trauma within 1 month                        (5 Points)  [ ] Lupus anticoagulants                                     (3 Points)                                                           [ ] Anticardiolipin antibodies                               (3 Points)                                                       [ ] High homocysteine in the blood                      (3 Points)                                             [ ] Other congenital or acquired thrombophilia      (3 Points)                                                [ ] Heparin induced thrombocytopenia                  (3 Points)                                     Total Score [          ]     Airway:  normal    Mallampati-       Dental: Patient denies loose teeth, Partial upper dentures    Activity : walk in house, exercise legs , walk stairs, cooking , cleaning   dasi mets : 7 dasi mets     NYAI VTE 2.0 SCORE [CLOT updated 2019]    AGE RELATED RISK FACTORS                                                       MOBILITY RELATED FACTORS  [ ] Age 41-60 years                                            (1 Point)                    [ ] Bed rest                                                        (1 Point)  [ ] Age: 61-74 years                                           (2 Points)                  [ ] Plaster cast                                                   (2 Points)  [ ] Age= 75 years                                              (3 Points)                    [ ] Bed bound for more than 72 hours                 (2 Points)    DISEASE RELATED RISK FACTORS                                               GENDER SPECIFIC FACTORS  [ ] Edema in the lower extremities                       (1 Point)              [ ] Pregnancy                                                     (1 Point)  [ ] Varicose veins                                               (1 Point)                     [ ] Post-partum < 6 weeks                                   (1 Point)             [ ] BMI > 25 Kg/m2                                            (1 Point)                     [ ] Hormonal therapy  or oral contraception          (1 Point)                 [ ] Sepsis (in the previous month)                        (1 Point)               [ ] History of pregnancy complications                 (1 point)  [ ] Pneumonia or serious lung disease                                               [ ] Unexplained or recurrent                     (1 Point)           (in the previous month)                               (1 Point)  [ ] Abnormal pulmonary function test                     (1 Point)                 SURGERY RELATED RISK FACTORS  [ ] Acute myocardial infarction                              (1 Point)               [ ]  Section                                             (1 Point)  [ ] Congestive heart failure (in the previous month)  (1 Point)      [ ] Minor surgery                                                  (1 Point)   [ ] Inflammatory bowel disease                             (1 Point)               [ ] Arthroscopic surgery                                        (2 Points)  [ ] Central venous access                                      (2 Points)                [ ] General surgery lasting more than 45 minutes (2 points)  [ ] Malignancy- Present or previous                   (2 Points)                [ ] Elective arthroplasty                                         (5 points)    [ ] Stroke (in the previous month)                          (5 Points)                                                                                                                                                           HEMATOLOGY RELATED FACTORS                                                 TRAUMA RELATED RISK FACTORS  [ ] Prior episodes of VTE                                     (3 Points)                [ ] Fracture of the hip, pelvis, or leg                       (5 Points)  [ ] Positive family history for VTE                         (3 Points)             [ ] Acute spinal cord injury (in the previous month)  (5 Points)  [ ] Prothrombin 61723 A                                     (3 Points)               [ ] Paralysis  (less than 1 month)                             (5 Points)  [ ] Factor V Leiden                                             (3 Points)                  [ ] Multiple Trauma within 1 month                        (5 Points)  [ ] Lupus anticoagulants                                     (3 Points)                                                           [ ] Anticardiolipin antibodies                               (3 Points)                                                       [ ] High homocysteine in the blood                      (3 Points)                                             [ ] Other congenital or acquired thrombophilia      (3 Points)                                                [ ] Heparin induced thrombocytopenia                  (3 Points)                                     Total Score [          ]     Airway:  normal    Mallampati-  3     Dental: Patient denies loose teeth, Partial upper dentures    Activity : walk in house, exercise legs , walk stairs, cooking , cleaning   dasi mets : 7 dasi mets     CAPRINI VTE 2.0 SCORE [CLOT updated 2019]    AGE RELATED RISK FACTORS                                                       MOBILITY RELATED FACTORS  [ ] Age 41-60 years                                            (1 Point)                    [ ] Bed rest                                                        (1 Point)  [ ] Age: 61-74 years                                           (2 Points)                  [ ] Plaster cast                                                   (2 Points)  [ x] Age= 75 years                                              (3 Points)                    [ ] Bed bound for more than 72 hours                 (2 Points)    DISEASE RELATED RISK FACTORS                                               GENDER SPECIFIC FACTORS  [ ] Edema in the lower extremities                       (1 Point)              [ ] Pregnancy                                                     (1 Point)  [ ] Varicose veins                                               (1 Point)                     [ ] Post-partum < 6 weeks                                   (1 Point)             [ x] BMI > 25 Kg/m2                                            (1 Point)                     [ ] Hormonal therapy  or oral contraception          (1 Point)                 [ ] Sepsis (in the previous month)                        (1 Point)               [ ] History of pregnancy complications                 (1 point)  [ ] Pneumonia or serious lung disease                                               [ ] Unexplained or recurrent                     (1 Point)           (in the previous month)                               (1 Point)  [ ] Abnormal pulmonary function test                     (1 Point)                 SURGERY RELATED RISK FACTORS  [ ] Acute myocardial infarction                              (1 Point)               [ ]  Section                                             (1 Point)  [ ] Congestive heart failure (in the previous month)  (1 Point)      [ ] Minor surgery                                                  (1 Point)   [ ] Inflammatory bowel disease                             (1 Point)               [ ] Arthroscopic surgery                                        (2 Points)  [ ] Central venous access                                      (2 Points)                [x ] General surgery lasting more than 45 minutes (2 points)  [ ] Malignancy- Present or previous                   (2 Points)                [ ] Elective arthroplasty                                         (5 points)    [ ] Stroke (in the previous month)                          (5 Points)                                                                                                                                                           HEMATOLOGY RELATED FACTORS                                                 TRAUMA RELATED RISK FACTORS  [ ] Prior episodes of VTE                                     (3 Points)                [ ] Fracture of the hip, pelvis, or leg                       (5 Points)  [ ] Positive family history for VTE                         (3 Points)             [ ] Acute spinal cord injury (in the previous month)  (5 Points)  [ ] Prothrombin 72738 A                                     (3 Points)               [ ] Paralysis  (less than 1 month)                             (5 Points)  [ ] Factor V Leiden                                             (3 Points)                  [ ] Multiple Trauma within 1 month                        (5 Points)  [ ] Lupus anticoagulants                                     (3 Points)                                                           [ ] Anticardiolipin antibodies                               (3 Points)                                                       [ ] High homocysteine in the blood                      (3 Points)                                             [ ] Other congenital or acquired thrombophilia      (3 Points)                                                [ ] Heparin induced thrombocytopenia                  (3 Points)                                     Total Score [      6    ]     Airway:  normal    Mallampati-  3     Dental: Patient denies loose teeth, Partial upper dentures    Activity : walk in house, exercise legs , walk stairs, cooking , cleaning   dasi mets : 7 dasi mets     CAPRINI VTE 2.0 SCORE [CLOT updated 2019]    AGE RELATED RISK FACTORS                                                       MOBILITY RELATED FACTORS  [ ] Age 41-60 years                                            (1 Point)                    [ ] Bed rest                                                        (1 Point)  [ ] Age: 61-74 years                                           (2 Points)                  [ ] Plaster cast                                                   (2 Points)  [ x] Age= 75 years                                              (3 Points)                    [ ] Bed bound for more than 72 hours                 (2 Points)    DISEASE RELATED RISK FACTORS                                               GENDER SPECIFIC FACTORS  [ ] Edema in the lower extremities                       (1 Point)              [ ] Pregnancy                                                     (1 Point)  [ ] Varicose veins                                               (1 Point)                     [ ] Post-partum < 6 weeks                                   (1 Point)             [ x] BMI > 25 Kg/m2                                            (1 Point)                     [ ] Hormonal therapy  or oral contraception          (1 Point)                 [ ] Sepsis (in the previous month)                        (1 Point)               [ ] History of pregnancy complications                 (1 point)  [ ] Pneumonia or serious lung disease                                               [ ] Unexplained or recurrent                     (1 Point)           (in the previous month)                               (1 Point)  [ ] Abnormal pulmonary function test                     (1 Point)                 SURGERY RELATED RISK FACTORS  [ ] Acute myocardial infarction                              (1 Point)               [ ]  Section                                             (1 Point)  [ ] Congestive heart failure (in the previous month)  (1 Point)      [ ] Minor surgery                                                  (1 Point)   [ ] Inflammatory bowel disease                             (1 Point)               [ ] Arthroscopic surgery                                        (2 Points)  [ ] Central venous access                                      (2 Points)                [x ] General surgery lasting more than 45 minutes (2 points)  [ ] Malignancy- Present or previous                   (2 Points)                [ ] Elective arthroplasty                                         (5 points)    [ ] Stroke (in the previous month)                          (5 Points)                                                                                                                                                           HEMATOLOGY RELATED FACTORS                                                 TRAUMA RELATED RISK FACTORS  [ ] Prior episodes of VTE                                     (3 Points)                [ ] Fracture of the hip, pelvis, or leg                       (5 Points)  [ ] Positive family history for VTE                         (3 Points)             [ ] Acute spinal cord injury (in the previous month)  (5 Points)  [ ] Prothrombin 45368 A                                     (3 Points)               [ ] Paralysis  (less than 1 month)                             (5 Points)  [ ] Factor V Leiden                                             (3 Points)                  [ ] Multiple Trauma within 1 month                        (5 Points)  [ ] Lupus anticoagulants                                     (3 Points)                                                           [ ] Anticardiolipin antibodies                               (3 Points)                                                       [ ] High homocysteine in the blood                      (3 Points)                                             [ ] Other congenital or acquired thrombophilia      (3 Points)                                                [ ] Heparin induced thrombocytopenia                  (3 Points)                                     Total Score [      6    ]

## 2023-12-13 NOTE — H&P PST ADULT - NSICDXPASTSURGICALHX_GEN_ALL_CORE_FT
PAST SURGICAL HISTORY:  H/O transurethral resection of bladder tumor (TURBT)     History of loop recorder     S/P breast biopsy, left     S/P cataract extraction and insertion of intraocular lens     S/P cystoscopy     S/P D&C     Status post cataract extraction OD    Stented coronary artery

## 2023-12-13 NOTE — H&P PST ADULT - PROBLEM SELECTOR PLAN 1
Endovascular repair of abdominal aortic aneurysm on 1/3/2024.  Chlorhexidine wash give Pre-Op      Pt had recent admission to ED in [place] with epigastric pain ( 11/21/2023) , worked up for PE , CT chest & abdomen were negative along with cardiac enzymes . Pt saw cardio on 12/1/2023- Pt to have stress test on 12/18/2023 - results will be in All scripts . Endovascular repair of abdominal aortic aneurysm on 1/3/2024.  Chlorhexidine wash give Pre-Op      Pt had recent admission to ED in [place] with epigastric pain ( 11/21/2023) , worked up for PE , CT chest & abdomen were negative along with cardiac enzymes . Pt saw cardio on 12/1/2023- Pt to have stress test on 12/18/2023 - results will be in All scripts .

## 2023-12-13 NOTE — H&P PST ADULT - NSANTHOSAYNRD_GEN_A_CORE
No. SUMANTH screening performed.  STOP BANG Legend: 0-2 = LOW Risk; 3-4 = INTERMEDIATE Risk; 5-8 = HIGH Risk

## 2023-12-13 NOTE — H&P PST ADULT - HISTORY OF PRESENT ILLNESS
89 yr old female  89 yr old female with history of HTN, [place] , Bladder Ca (2012) - treated with BCG , Osteoarthritis, h/o Syncope - s/p  loop recorder placement & removal, NSTEMI (2019) , CAD with angina (2019) , 2020- s/p 5 stents placed -ON ASPIRIN 81 mg , Neuropathy of right arm since cardiac cath, AAA being monitored for several years - with recent increase in size to 5.5 cm. Now coming in for Endovascular repair of abdominal aortic aneurysm on 1/3/2024.    ***Pt had recent admission to ED in [place] with epigastric pain ( 11/21/2023) , worked up for PE , CT chest & abdomen were negative along with cardiac enzymes . Pt saw cardio on 12/1/2023- Pt to have stress test on 12/18/2023 - results will be in All scripts > ****     89 yr old female with history of HTN, [place] , Bladder Ca (2012) - treated with BCG , Osteoarthritis, h/o Syncope - s/p  loop recorder placement & removal, NSTEMI (2019) , CAD with angina (2019) , 2020- s/p 5 stents placed -ON ASPIRIN 81 mg , Neuropathy of right arm since cardiac cath, AAA being monitored for several years - with recent increase in size to 5.5 cm. Now coming in for Endovascular repair of abdominal aortic aneurysm on 1/3/2024.    ***Pt had recent admission to ED in [place] with epigastric pain ( 11/21/2023) , worked up for PE , CT chest & abdomen were negative along with cardiac enzymes . Pt saw cardio on 12/1/2023- Pt to have stress test on 12/18/2023 - results will be in All scripts > ****     89 yr old female with history of HTN, [place] , Bladder Ca (2012) - treated with BCG , Osteoarthritis, h/o Syncope - s/p  loop recorder placement & removal, NSTEMI (2019) , CAD with angina (2019) , 2020- s/p 5 stents placed -ON ASPIRIN 81 mg , Moderate aortic stenosis, Neuropathy of right arm since cardiac cath, AAA being monitored for several years - with recent increase in size to 5.5 cm. Now coming in for Endovascular repair of abdominal aortic aneurysm on 1/3/2024.    ***Pt had recent admission to ED in [place] with epigastric pain ( 11/21/2023) , worked up for PE , CT chest & abdomen were negative along with cardiac enzymes . Pt saw cardio on 12/1/2023- Pt to have stress test on 12/18/2023 - results will be in All scripts > ****     89 yr old female with history of HTN, [place] , Bladder Ca (2012) - treated with BCG , Osteoarthritis, h/o Syncope - s/p  loop recorder placement & removal, NSTEMI (2019) , CAD with angina (2019) , 2020- s/p 5 stents placed -ON ASPIRIN 81 mg , Moderate aortic stenosis, Neuropathy of right arm since cardiac cath, AAA being monitored for several years - with recent increase in size to 5.5 cm. Now coming in for Endovascular repair of abdominal aortic aneurysm on 1/3/2024.    ***Pt had recent admission to ED in [place] with epigastric pain ( 11/21/2023) , worked up for PE , CT chest & abdomen were negative along with cardiac enzymes . Pt saw cardio on 12/1/2023- Pt to have stress test on 12/18/2023 - results will be in All scripts > ****

## 2023-12-15 ENCOUNTER — NON-APPOINTMENT (OUTPATIENT)
Age: 88
End: 2023-12-15

## 2023-12-18 ENCOUNTER — APPOINTMENT (OUTPATIENT)
Dept: CARDIOLOGY | Facility: CLINIC | Age: 88
End: 2023-12-18
Payer: MEDICARE

## 2023-12-18 PROBLEM — G56.91 UNSPECIFIED MONONEUROPATHY OF RIGHT UPPER LIMB: Chronic | Status: ACTIVE | Noted: 2023-12-13

## 2023-12-18 PROBLEM — I35.0 NONRHEUMATIC AORTIC (VALVE) STENOSIS: Chronic | Status: ACTIVE | Noted: 2023-12-13

## 2023-12-18 PROCEDURE — 78452 HT MUSCLE IMAGE SPECT MULT: CPT

## 2023-12-18 PROCEDURE — 93015 CV STRESS TEST SUPVJ I&R: CPT

## 2023-12-18 PROCEDURE — A9500: CPT

## 2023-12-19 PROBLEM — H91.90 UNSPECIFIED HEARING LOSS, UNSPECIFIED EAR: Chronic | Status: ACTIVE | Noted: 2023-12-13

## 2023-12-19 PROBLEM — Z87.39 PERSONAL HISTORY OF OTHER DISEASES OF THE MUSCULOSKELETAL SYSTEM AND CONNECTIVE TISSUE: Chronic | Status: ACTIVE | Noted: 2023-12-13

## 2023-12-19 PROBLEM — Z87.19 PERSONAL HISTORY OF OTHER DISEASES OF THE DIGESTIVE SYSTEM: Chronic | Status: ACTIVE | Noted: 2023-12-13

## 2023-12-19 PROBLEM — M19.90 UNSPECIFIED OSTEOARTHRITIS, UNSPECIFIED SITE: Chronic | Status: ACTIVE | Noted: 2023-12-13

## 2023-12-19 PROBLEM — M19.072 PRIMARY OSTEOARTHRITIS, LEFT ANKLE AND FOOT: Chronic | Status: ACTIVE | Noted: 2023-12-13

## 2023-12-19 PROBLEM — Z87.898 PERSONAL HISTORY OF OTHER SPECIFIED CONDITIONS: Chronic | Status: ACTIVE | Noted: 2023-12-13

## 2023-12-20 ENCOUNTER — APPOINTMENT (OUTPATIENT)
Dept: CARDIOLOGY | Facility: CLINIC | Age: 88
End: 2023-12-20
Payer: MEDICARE

## 2023-12-20 PROCEDURE — A9500: CPT

## 2023-12-20 PROCEDURE — 78452 HT MUSCLE IMAGE SPECT MULT: CPT

## 2023-12-20 PROCEDURE — 93015 CV STRESS TEST SUPVJ I&R: CPT

## 2023-12-22 ENCOUNTER — NON-APPOINTMENT (OUTPATIENT)
Age: 88
End: 2023-12-22

## 2024-01-18 ENCOUNTER — OUTPATIENT (OUTPATIENT)
Dept: OUTPATIENT SERVICES | Facility: HOSPITAL | Age: 89
LOS: 1 days | End: 2024-01-18
Payer: MEDICARE

## 2024-01-18 VITALS
WEIGHT: 149.03 LBS | SYSTOLIC BLOOD PRESSURE: 152 MMHG | HEART RATE: 56 BPM | HEIGHT: 61 IN | OXYGEN SATURATION: 98 % | DIASTOLIC BLOOD PRESSURE: 66 MMHG | RESPIRATION RATE: 20 BRPM | TEMPERATURE: 98 F

## 2024-01-18 DIAGNOSIS — Z98.49 CATARACT EXTRACTION STATUS, UNSPECIFIED EYE: Chronic | ICD-10-CM

## 2024-01-18 DIAGNOSIS — Z98.890 OTHER SPECIFIED POSTPROCEDURAL STATES: Chronic | ICD-10-CM

## 2024-01-18 DIAGNOSIS — I71.40 ABDOMINAL AORTIC ANEURYSM, WITHOUT RUPTURE, UNSPECIFIED: ICD-10-CM

## 2024-01-18 DIAGNOSIS — Z95.5 PRESENCE OF CORONARY ANGIOPLASTY IMPLANT AND GRAFT: Chronic | ICD-10-CM

## 2024-01-18 DIAGNOSIS — Z01.818 ENCOUNTER FOR OTHER PREPROCEDURAL EXAMINATION: ICD-10-CM

## 2024-01-18 LAB
ANION GAP SERPL CALC-SCNC: 10 MMOL/L — SIGNIFICANT CHANGE UP (ref 5–17)
BLD GP AB SCN SERPL QL: NEGATIVE — SIGNIFICANT CHANGE UP
BUN SERPL-MCNC: 27 MG/DL — HIGH (ref 7–23)
CALCIUM SERPL-MCNC: 9.6 MG/DL — SIGNIFICANT CHANGE UP (ref 8.4–10.5)
CHLORIDE SERPL-SCNC: 105 MMOL/L — SIGNIFICANT CHANGE UP (ref 96–108)
CO2 SERPL-SCNC: 27 MMOL/L — SIGNIFICANT CHANGE UP (ref 22–31)
CREAT SERPL-MCNC: 0.85 MG/DL — SIGNIFICANT CHANGE UP (ref 0.5–1.3)
EGFR: 65 ML/MIN/1.73M2 — SIGNIFICANT CHANGE UP
GLUCOSE SERPL-MCNC: 113 MG/DL — HIGH (ref 70–99)
HCT VFR BLD CALC: 34.5 % — SIGNIFICANT CHANGE UP (ref 34.5–45)
HGB BLD-MCNC: 12.1 G/DL — SIGNIFICANT CHANGE UP (ref 11.5–15.5)
MCHC RBC-ENTMCNC: 32.4 PG — SIGNIFICANT CHANGE UP (ref 27–34)
MCHC RBC-ENTMCNC: 35.1 GM/DL — SIGNIFICANT CHANGE UP (ref 32–36)
MCV RBC AUTO: 92.5 FL — SIGNIFICANT CHANGE UP (ref 80–100)
NRBC # BLD: 0 /100 WBCS — SIGNIFICANT CHANGE UP (ref 0–0)
PLATELET # BLD AUTO: 197 K/UL — SIGNIFICANT CHANGE UP (ref 150–400)
POTASSIUM SERPL-MCNC: 4.1 MMOL/L — SIGNIFICANT CHANGE UP (ref 3.5–5.3)
POTASSIUM SERPL-SCNC: 4.1 MMOL/L — SIGNIFICANT CHANGE UP (ref 3.5–5.3)
RBC # BLD: 3.73 M/UL — LOW (ref 3.8–5.2)
RBC # FLD: 13.7 % — SIGNIFICANT CHANGE UP (ref 10.3–14.5)
RH IG SCN BLD-IMP: NEGATIVE — SIGNIFICANT CHANGE UP
SODIUM SERPL-SCNC: 142 MMOL/L — SIGNIFICANT CHANGE UP (ref 135–145)
WBC # BLD: 6.11 K/UL — SIGNIFICANT CHANGE UP (ref 3.8–10.5)
WBC # FLD AUTO: 6.11 K/UL — SIGNIFICANT CHANGE UP (ref 3.8–10.5)

## 2024-01-18 PROCEDURE — 85027 COMPLETE CBC AUTOMATED: CPT

## 2024-01-18 PROCEDURE — 86901 BLOOD TYPING SEROLOGIC RH(D): CPT

## 2024-01-18 PROCEDURE — 86900 BLOOD TYPING SEROLOGIC ABO: CPT

## 2024-01-18 PROCEDURE — G0463: CPT

## 2024-01-18 PROCEDURE — 80048 BASIC METABOLIC PNL TOTAL CA: CPT

## 2024-01-18 PROCEDURE — 86850 RBC ANTIBODY SCREEN: CPT

## 2024-01-18 NOTE — H&P PST ADULT - PROBLEM SELECTOR PLAN 1
Endovascular repair of abdominal aortic aneurysm on 1/3/2024.  Chlorhexidine wash give Pre-Op      Pt had recent admission to ED in [place] with epigastric pain ( 11/21/2023) , worked up for PE , CT chest & abdomen were negative along with cardiac enzymes . Pt saw cardio on 12/1/2023- Pt to have stress test on 12/18/2023 - results will be in All scripts .

## 2024-01-18 NOTE — H&P PST ADULT - NSICDXPASTMEDICALHX_GEN_ALL_CORE_FT
PAST MEDICAL HISTORY:  AAA (abdominal aortic aneurysm)     Arthritis of left ankle     Back pain, chronic occasional lower back pain    Benign paroxysmal vertigo of right ear history of right ear abscess    Bladder cancer dx 2012 treated with BCG    Coronary artery disease of native artery of native heart with stable angina pectoris     H/O gastritis     H/O syncope     History of loop recorder placed by Dr. DHARMESH Ibarra. Removed 2019    History of trigger finger     [place] (hard of hearing)     HTN (hypertension)     Moderate aortic stenosis     Neuropathy, arm, right     Osteoarthritis

## 2024-01-18 NOTE — H&P PST ADULT - ASSESSMENT
Airway:  normal    Mallampati-  3     Dental: Patient denies loose teeth, Partial upper dentures    Activity : walk in house, exercise legs , walk stairs, cooking , cleaning   dasi mets : 7 dasi mets     CAPRINI VTE 2.0 SCORE [CLOT updated 2019]    AGE RELATED RISK FACTORS                                                       MOBILITY RELATED FACTORS  [ ] Age 41-60 years                                            (1 Point)                    [ ] Bed rest                                                        (1 Point)  [ ] Age: 61-74 years                                           (2 Points)                  [ ] Plaster cast                                                   (2 Points)  [ x] Age= 75 years                                              (3 Points)                    [ ] Bed bound for more than 72 hours                 (2 Points)    DISEASE RELATED RISK FACTORS                                               GENDER SPECIFIC FACTORS  [ ] Edema in the lower extremities                       (1 Point)              [ ] Pregnancy                                                     (1 Point)  [ ] Varicose veins                                               (1 Point)                     [ ] Post-partum < 6 weeks                                   (1 Point)             [ x] BMI > 25 Kg/m2                                            (1 Point)                     [ ] Hormonal therapy  or oral contraception          (1 Point)                 [ ] Sepsis (in the previous month)                        (1 Point)               [ ] History of pregnancy complications                 (1 point)  [ ] Pneumonia or serious lung disease                                               [ ] Unexplained or recurrent                     (1 Point)           (in the previous month)                               (1 Point)  [ ] Abnormal pulmonary function test                     (1 Point)                 SURGERY RELATED RISK FACTORS  [ ] Acute myocardial infarction                              (1 Point)               [ ]  Section                                             (1 Point)  [ ] Congestive heart failure (in the previous month)  (1 Point)      [ ] Minor surgery                                                  (1 Point)   [ ] Inflammatory bowel disease                             (1 Point)               [ ] Arthroscopic surgery                                        (2 Points)  [ ] Central venous access                                      (2 Points)                [x ] General surgery lasting more than 45 minutes (2 points)  [ ] Malignancy- Present or previous                   (2 Points)                [ ] Elective arthroplasty                                         (5 points)    [ ] Stroke (in the previous month)                          (5 Points)                                                                                                                                                           HEMATOLOGY RELATED FACTORS                                                 TRAUMA RELATED RISK FACTORS  [ ] Prior episodes of VTE                                     (3 Points)                [ ] Fracture of the hip, pelvis, or leg                       (5 Points)  [ ] Positive family history for VTE                         (3 Points)             [ ] Acute spinal cord injury (in the previous month)  (5 Points)  [ ] Prothrombin 07799 A                                     (3 Points)               [ ] Paralysis  (less than 1 month)                             (5 Points)  [ ] Factor V Leiden                                             (3 Points)                  [ ] Multiple Trauma within 1 month                        (5 Points)  [ ] Lupus anticoagulants                                     (3 Points)                                                           [ ] Anticardiolipin antibodies                               (3 Points)                                                       [ ] High homocysteine in the blood                      (3 Points)                                             [ ] Other congenital or acquired thrombophilia      (3 Points)                                                [ ] Heparin induced thrombocytopenia                  (3 Points)                                     Total Score [      6    ]     Airway:  normal    Mallampati-  2     Dental: Patient denies loose teeth, Partial upper dentures    Activity : walk in house, exercise legs , walk stairs, cooking , cleaning   dasi mets : 7 dasi mets     NYAI VTE 2.0 SCORE [CLOT updated 2019]    AGE RELATED RISK FACTORS                                                       MOBILITY RELATED FACTORS  [ ] Age 41-60 years                                            (1 Point)                    [ ] Bed rest                                                        (1 Point)  [ ] Age: 61-74 years                                           (2 Points)                  [ ] Plaster cast                                                   (2 Points)  [ x] Age= 75 years                                              (3 Points)                    [ ] Bed bound for more than 72 hours                 (2 Points)    DISEASE RELATED RISK FACTORS                                               GENDER SPECIFIC FACTORS  [ ] Edema in the lower extremities                       (1 Point)              [ ] Pregnancy                                                     (1 Point)  [x ] Varicose veins                                               (1 Point)                     [ ] Post-partum < 6 weeks                                   (1 Point)             [ x] BMI > 25 Kg/m2                                            (1 Point)                     [ ] Hormonal therapy  or oral contraception          (1 Point)                 [ ] Sepsis (in the previous month)                        (1 Point)               [ ] History of pregnancy complications                 (1 point)  [ ] Pneumonia or serious lung disease                                               [ ] Unexplained or recurrent                     (1 Point)           (in the previous month)                               (1 Point)  [ ] Abnormal pulmonary function test                     (1 Point)                 SURGERY RELATED RISK FACTORS  [ ] Acute myocardial infarction                              (1 Point)               [ ]  Section                                             (1 Point)  [ ] Congestive heart failure (in the previous month)  (1 Point)      [ ] Minor surgery                                                  (1 Point)   [ ] Inflammatory bowel disease                             (1 Point)               [ ] Arthroscopic surgery                                        (2 Points)  [ ] Central venous access                                      (2 Points)                [x ] General surgery lasting more than 45 minutes (2 points)  [ ] Malignancy- Present or previous                   (2 Points)                [ ] Elective arthroplasty                                         (5 points)    [ ] Stroke (in the previous month)                          (5 Points)                                                                                                                                                           HEMATOLOGY RELATED FACTORS                                                 TRAUMA RELATED RISK FACTORS  [ ] Prior episodes of VTE                                     (3 Points)                [ ] Fracture of the hip, pelvis, or leg                       (5 Points)  [ ] Positive family history for VTE                         (3 Points)             [ ] Acute spinal cord injury (in the previous month)  (5 Points)  [ ] Prothrombin 56136 A                                     (3 Points)               [ ] Paralysis  (less than 1 month)                             (5 Points)  [ ] Factor V Leiden                                             (3 Points)                  [ ] Multiple Trauma within 1 month                        (5 Points)  [ ] Lupus anticoagulants                                     (3 Points)                                                           [ ] Anticardiolipin antibodies                               (3 Points)                                                       [ ] High homocysteine in the blood                      (3 Points)                                             [ ] Other congenital or acquired thrombophilia      (3 Points)                                                [ ] Heparin induced thrombocytopenia                  (3 Points)                                     Total Score [      7    ]

## 2024-01-18 NOTE — H&P PST ADULT - LAST ECHOCARDIOGRAM
2023- The left ventricular systolic function is normal with an ejection fraction of 55 % by Garcia's method of disks. There are regional wall motion abnormalities., Moderate Aortic stenosis

## 2024-01-18 NOTE — H&P PST ADULT - HISTORY OF PRESENT ILLNESS
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 Now coming in for Endovascular repair of abdominal aortic aneurysm on 1/3/2024.    ***Pt had recent admission to ED in [place] with epigastric pain ( 11/21/2023) , worked up for PE , CT chest & abdomen were negative along with cardiac enzymes . 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 Now coming in for Endovascular repair of abdominal aortic aneurysm on 1/22/23      surgery as cancelled in 1/3/24  due to insurance issues

## 2024-01-18 NOTE — H&P PST ADULT - PROBLEM SELECTOR PLAN 3
cardio on 12/1/2023- Pt to have stress test on 12/18/2023 - results will be in All scripts .  Stented coronary artery - to stay on aspirin until DOS cardio eval done 12/1/2023-   Pt had  stress test done 12/18/2023 -  Stented coronary artery - to stay on aspirin until DOS

## 2024-01-21 ENCOUNTER — TRANSCRIPTION ENCOUNTER (OUTPATIENT)
Age: 89
End: 2024-01-21

## 2024-01-22 ENCOUNTER — INPATIENT (INPATIENT)
Facility: HOSPITAL | Age: 89
LOS: 0 days | Discharge: HOME | DRG: 269 | End: 2024-01-23
Attending: SURGERY | Admitting: SURGERY
Payer: COMMERCIAL

## 2024-01-22 ENCOUNTER — APPOINTMENT (OUTPATIENT)
Dept: VASCULAR SURGERY | Facility: HOSPITAL | Age: 89
End: 2024-01-22
Payer: MEDICARE

## 2024-01-22 VITALS
SYSTOLIC BLOOD PRESSURE: 184 MMHG | HEART RATE: 77 BPM | HEIGHT: 61.61 IN | RESPIRATION RATE: 18 BRPM | DIASTOLIC BLOOD PRESSURE: 76 MMHG | WEIGHT: 141.1 LBS | TEMPERATURE: 98 F

## 2024-01-22 DIAGNOSIS — Z98.890 OTHER SPECIFIED POSTPROCEDURAL STATES: Chronic | ICD-10-CM

## 2024-01-22 DIAGNOSIS — Z98.49 CATARACT EXTRACTION STATUS, UNSPECIFIED EYE: Chronic | ICD-10-CM

## 2024-01-22 DIAGNOSIS — Z95.5 PRESENCE OF CORONARY ANGIOPLASTY IMPLANT AND GRAFT: Chronic | ICD-10-CM

## 2024-01-22 DIAGNOSIS — I71.40 ABDOMINAL AORTIC ANEURYSM, WITHOUT RUPTURE, UNSPECIFIED: ICD-10-CM

## 2024-01-22 LAB
BLD GP AB SCN SERPL QL: NEGATIVE — SIGNIFICANT CHANGE UP
RH IG SCN BLD-IMP: NEGATIVE — SIGNIFICANT CHANGE UP

## 2024-01-22 PROCEDURE — 34705 EVAC RPR A-BIILIAC NDGFT: CPT

## 2024-01-22 PROCEDURE — 34713 PERQ ACCESS & CLSR FEM ART: CPT | Mod: 50

## 2024-01-22 DEVICE — CATH BLLN MOLD OCCLUSION 10-37MM 4X90CM: Type: IMPLANTABLE DEVICE | Site: BILATERAL | Status: FUNCTIONAL

## 2024-01-22 DEVICE — GUIDEWIRE RADIFOCUS GLIDEWIRE STANDARD ANGLED TIP 0.035" X 260CM: Type: IMPLANTABLE DEVICE | Site: BILATERAL | Status: FUNCTIONAL

## 2024-01-22 DEVICE — CATH ANGIO GLIDECATH ANGLE TAPER 5FR X 65CM: Type: IMPLANTABLE DEVICE | Site: BILATERAL | Status: FUNCTIONAL

## 2024-01-22 DEVICE — IMPLANTABLE DEVICE: Type: IMPLANTABLE DEVICE | Site: BILATERAL | Status: FUNCTIONAL

## 2024-01-22 DEVICE — CATH ANG PIGVSC 0.035IN 5FR: Type: IMPLANTABLE DEVICE | Site: BILATERAL | Status: FUNCTIONAL

## 2024-01-22 DEVICE — SHEATH INTRODUCER TERUMO PINNACLE PERIPHERAL 6FR X 10CM: Type: IMPLANTABLE DEVICE | Site: BILATERAL | Status: FUNCTIONAL

## 2024-01-22 DEVICE — KIT A-LINE 1LUM 20G X 12CM SAFE KIT: Type: IMPLANTABLE DEVICE | Site: BILATERAL | Status: FUNCTIONAL

## 2024-01-22 DEVICE — DRYSEAL FLEX INTRO SHEATH 12FR 33CM: Type: IMPLANTABLE DEVICE | Site: BILATERAL | Status: FUNCTIONAL

## 2024-01-22 DEVICE — SHEATH INTRODUCER TERUMO PINNACLE PERIPHERAL 8FR X 10CM: Type: IMPLANTABLE DEVICE | Site: BILATERAL | Status: FUNCTIONAL

## 2024-01-22 DEVICE — INTRODUCER SHEATH DRYSEAL FLEX 20FR X 33CM: Type: IMPLANTABLE DEVICE | Site: BILATERAL | Status: FUNCTIONAL

## 2024-01-22 DEVICE — GWIRE ANGL .035X150 STIFF: Type: IMPLANTABLE DEVICE | Site: BILATERAL | Status: FUNCTIONAL

## 2024-01-22 DEVICE — GUIDEWIRE LUNDERQUIST EXTRA-STIFF STRAIGHT 0.035" X 260CM: Type: IMPLANTABLE DEVICE | Site: BILATERAL | Status: FUNCTIONAL

## 2024-01-22 DEVICE — SUT PERCLOSE PROGLIDE 6FR: Type: IMPLANTABLE DEVICE | Site: BILATERAL | Status: FUNCTIONAL

## 2024-01-22 DEVICE — SHEATH INTRODUCER TERUMO PINNACLE PERIPHERAL 11FR X 10CM: Type: IMPLANTABLE DEVICE | Site: BILATERAL | Status: FUNCTIONAL

## 2024-01-22 DEVICE — INTRODUCER SET MICROPUNCTURE ACCESS 21G X 7CM: Type: IMPLANTABLE DEVICE | Site: BILATERAL | Status: FUNCTIONAL

## 2024-01-22 RX ORDER — SODIUM CHLORIDE 9 MG/ML
3 INJECTION INTRAMUSCULAR; INTRAVENOUS; SUBCUTANEOUS EVERY 8 HOURS
Refills: 0 | Status: DISCONTINUED | OUTPATIENT
Start: 2024-01-22 | End: 2024-01-22

## 2024-01-22 RX ORDER — PHENYLEPHRINE HYDROCHLORIDE 10 MG/ML
0.2 INJECTION INTRAVENOUS
Qty: 40 | Refills: 0 | Status: DISCONTINUED | OUTPATIENT
Start: 2024-01-22 | End: 2024-01-22

## 2024-01-22 RX ORDER — SODIUM CHLORIDE 9 MG/ML
250 INJECTION, SOLUTION INTRAVENOUS ONCE
Refills: 0 | Status: COMPLETED | OUTPATIENT
Start: 2024-01-22 | End: 2024-01-22

## 2024-01-22 RX ORDER — HYDROMORPHONE HYDROCHLORIDE 2 MG/ML
0.5 INJECTION INTRAMUSCULAR; INTRAVENOUS; SUBCUTANEOUS
Refills: 0 | Status: DISCONTINUED | OUTPATIENT
Start: 2024-01-22 | End: 2024-01-22

## 2024-01-22 RX ORDER — CEFAZOLIN SODIUM 1 G
2000 VIAL (EA) INJECTION ONCE
Refills: 0 | Status: DISCONTINUED | OUTPATIENT
Start: 2024-01-22 | End: 2024-01-22

## 2024-01-22 RX ORDER — SODIUM CHLORIDE 9 MG/ML
1000 INJECTION, SOLUTION INTRAVENOUS
Refills: 0 | Status: DISCONTINUED | OUTPATIENT
Start: 2024-01-22 | End: 2024-01-22

## 2024-01-22 RX ORDER — HEPARIN SODIUM 5000 [USP'U]/ML
5000 INJECTION INTRAVENOUS; SUBCUTANEOUS EVERY 8 HOURS
Refills: 0 | Status: DISCONTINUED | OUTPATIENT
Start: 2024-01-22 | End: 2024-01-23

## 2024-01-22 RX ORDER — NITROGLYCERIN 6.5 MG
1 CAPSULE, EXTENDED RELEASE ORAL
Refills: 0 | DISCHARGE

## 2024-01-22 RX ORDER — ASPIRIN/CALCIUM CARB/MAGNESIUM 324 MG
1 TABLET ORAL
Refills: 0 | DISCHARGE

## 2024-01-22 RX ORDER — HYDROMORPHONE HYDROCHLORIDE 2 MG/ML
1 INJECTION INTRAMUSCULAR; INTRAVENOUS; SUBCUTANEOUS
Refills: 0 | Status: DISCONTINUED | OUTPATIENT
Start: 2024-01-22 | End: 2024-01-22

## 2024-01-22 RX ORDER — ISOSORBIDE MONONITRATE 60 MG/1
1 TABLET, EXTENDED RELEASE ORAL
Refills: 0 | DISCHARGE

## 2024-01-22 RX ORDER — HYDROMORPHONE HYDROCHLORIDE 2 MG/ML
0.25 INJECTION INTRAMUSCULAR; INTRAVENOUS; SUBCUTANEOUS
Refills: 0 | Status: DISCONTINUED | OUTPATIENT
Start: 2024-01-22 | End: 2024-01-22

## 2024-01-22 RX ORDER — METOPROLOL TARTRATE 50 MG
25 TABLET ORAL DAILY
Refills: 0 | Status: DISCONTINUED | OUTPATIENT
Start: 2024-01-22 | End: 2024-01-23

## 2024-01-22 RX ORDER — CEFAZOLIN SODIUM 1 G
2000 VIAL (EA) INJECTION ONCE
Refills: 0 | Status: COMPLETED | OUTPATIENT
Start: 2024-01-22 | End: 2024-01-22

## 2024-01-22 RX ORDER — LABETALOL HCL 100 MG
5 TABLET ORAL ONCE
Refills: 0 | Status: DISCONTINUED | OUTPATIENT
Start: 2024-01-22 | End: 2024-01-23

## 2024-01-22 RX ORDER — CHLORHEXIDINE GLUCONATE 213 G/1000ML
1 SOLUTION TOPICAL ONCE
Refills: 0 | Status: DISCONTINUED | OUTPATIENT
Start: 2024-01-22 | End: 2024-01-22

## 2024-01-22 RX ORDER — ONDANSETRON 8 MG/1
4 TABLET, FILM COATED ORAL ONCE
Refills: 0 | Status: DISCONTINUED | OUTPATIENT
Start: 2024-01-22 | End: 2024-01-22

## 2024-01-22 RX ORDER — METOPROLOL TARTRATE 50 MG
1 TABLET ORAL
Refills: 0 | DISCHARGE

## 2024-01-22 RX ORDER — LIDOCAINE HCL 20 MG/ML
0.2 VIAL (ML) INJECTION ONCE
Refills: 0 | Status: DISCONTINUED | OUTPATIENT
Start: 2024-01-22 | End: 2024-01-22

## 2024-01-22 RX ORDER — ACETAMINOPHEN 500 MG
650 TABLET ORAL EVERY 6 HOURS
Refills: 0 | Status: DISCONTINUED | OUTPATIENT
Start: 2024-01-22 | End: 2024-01-23

## 2024-01-22 RX ORDER — EVOLOCUMAB 140 MG/ML
140 INJECTION, SOLUTION SUBCUTANEOUS
Refills: 0 | DISCHARGE

## 2024-01-22 RX ADMIN — SODIUM CHLORIDE 500 MILLILITER(S): 9 INJECTION, SOLUTION INTRAVENOUS at 11:37

## 2024-01-22 RX ADMIN — PHENYLEPHRINE HYDROCHLORIDE 4.8 MICROGRAM(S)/KG/MIN: 10 INJECTION INTRAVENOUS at 11:37

## 2024-01-22 RX ADMIN — Medication 650 MILLIGRAM(S): at 15:38

## 2024-01-22 RX ADMIN — Medication 25 MILLIGRAM(S): at 22:16

## 2024-01-22 RX ADMIN — Medication 650 MILLIGRAM(S): at 16:08

## 2024-01-22 RX ADMIN — HEPARIN SODIUM 5000 UNIT(S): 5000 INJECTION INTRAVENOUS; SUBCUTANEOUS at 22:30

## 2024-01-22 RX ADMIN — SODIUM CHLORIDE 125 MILLILITER(S): 9 INJECTION, SOLUTION INTRAVENOUS at 11:54

## 2024-01-22 RX ADMIN — Medication 650 MILLIGRAM(S): at 22:29

## 2024-01-22 NOTE — CHART NOTE - NSCHARTNOTEFT_GEN_A_CORE
STATUS POST:  EVAR    POST OPERATIVE DAY #: 0    SUBJECTIVE: Patient seen and examined without complaints. Pain is controlled. Denies SOB/CP/N/V. Notes she has some bruising on her hip from a fall a few weeks ago, no new leg or foot pain.      Vital Signs Last 24 Hrs  T(C): 36.2 (22 Jan 2024 14:00), Max: 36.7 (22 Jan 2024 06:54)  T(F): 97.2 (22 Jan 2024 14:00), Max: 98.1 (22 Jan 2024 06:54)  HR: 60 (22 Jan 2024 14:30) (50 - 77)  BP: 132/63 (22 Jan 2024 13:00) (95/48 - 184/76)  BP(mean): 90 (22 Jan 2024 13:00) (69 - 90)  RR: 16 (22 Jan 2024 14:30) (15 - 18)  SpO2: 96% (22 Jan 2024 14:30) (96% - 100%)    Parameters below as of 22 Jan 2024 14:00  Patient On (Oxygen Delivery Method): room air      I&O's Summary    22 Jan 2024 07:01  -  22 Jan 2024 14:27  --------------------------------------------------------  IN: 577 mL / OUT: 375 mL / NET: 202 mL      I&O's Detail    22 Jan 2024 07:01  -  22 Jan 2024 14:27  --------------------------------------------------------  IN:    Lactated Ringers: 325 mL    multiple electrolytes Injection Type 1 Bolus: 250 mL    Phenylephrine: 2 mL  Total IN: 577 mL    OUT:    Indwelling Catheter - Urethral (mL): 375 mL  Total OUT: 375 mL    Total NET: 202 mL          MEDICATIONS  (STANDING):  acetaminophen     Tablet .. 650 milliGRAM(s) Oral every 6 hours  chlorhexidine 2% Cloths 1 Application(s) Topical once  heparin   Injectable 5000 Unit(s) SubCutaneous every 8 hours  lactated ringers. 1000 milliLiter(s) (125 mL/Hr) IV Continuous <Continuous>  lidocaine 1% Injectable 0.2 milliLiter(s) Local Injection once  phenylephrine    Infusion 0.2 MICROgram(s)/kG/Min (4.8 mL/Hr) IV Continuous <Continuous>    MEDICATIONS  (PRN):  HYDROmorphone  Injectable 0.5 milliGRAM(s) IV Push every 15 minutes PRN Severe Pain (7 - 10)  HYDROmorphone  Injectable 0.25 milliGRAM(s) IV Push every 15 minutes PRN Moderate Pain (4 - 6)  ondansetron Injectable 4 milliGRAM(s) IV Push once PRN Nausea and/or Vomiting      Physical Exam  Gen: NAD, A&Ox3  Pulm: No respiratory distress, no subcostal retractions  CV: RRR, no JVD  Abd: Soft, NT, ND  Extremities: Ecchymosis to right hip c/w prior fall. Groins soft without appreciable collection, dressings C/D/I. Palpable DP pulses bilaterally    LABS:        RADIOLOGY & ADDITIONAL STUDIES:      A/P: 89y Female several hours s/p EVAR. Recovering appropriately.    Plan  - Diet: regular  - Activity: as tolerated  - Labs: AM  - Pain medication prn  - PACU 6 hours postop then floor  - DVT ppx: SCDs, SQH  - Pritchard out at 24 hours  - Abx 24 hours    [facility]  [id number] STATUS POST:  EVAR    POST OPERATIVE DAY #: 0    SUBJECTIVE: Patient seen and examined without complaints. Pain is controlled. Denies SOB/CP/N/V. Notes she has some bruising on her hip from a fall a few weeks ago, no new leg or foot pain.      Vital Signs Last 24 Hrs  T(C): 36.2 (22 Jan 2024 14:00), Max: 36.7 (22 Jan 2024 06:54)  T(F): 97.2 (22 Jan 2024 14:00), Max: 98.1 (22 Jan 2024 06:54)  HR: 60 (22 Jan 2024 14:30) (50 - 77)  BP: 132/63 (22 Jan 2024 13:00) (95/48 - 184/76)  BP(mean): 90 (22 Jan 2024 13:00) (69 - 90)  RR: 16 (22 Jan 2024 14:30) (15 - 18)  SpO2: 96% (22 Jan 2024 14:30) (96% - 100%)    Parameters below as of 22 Jan 2024 14:00  Patient On (Oxygen Delivery Method): room air      I&O's Summary    22 Jan 2024 07:01  -  22 Jan 2024 14:27  --------------------------------------------------------  IN: 577 mL / OUT: 375 mL / NET: 202 mL      I&O's Detail    22 Jan 2024 07:01  -  22 Jan 2024 14:27  --------------------------------------------------------  IN:    Lactated Ringers: 325 mL    multiple electrolytes Injection Type 1 Bolus: 250 mL    Phenylephrine: 2 mL  Total IN: 577 mL    OUT:    Indwelling Catheter - Urethral (mL): 375 mL  Total OUT: 375 mL    Total NET: 202 mL          MEDICATIONS  (STANDING):  acetaminophen     Tablet .. 650 milliGRAM(s) Oral every 6 hours  chlorhexidine 2% Cloths 1 Application(s) Topical once  heparin   Injectable 5000 Unit(s) SubCutaneous every 8 hours  lactated ringers. 1000 milliLiter(s) (125 mL/Hr) IV Continuous <Continuous>  lidocaine 1% Injectable 0.2 milliLiter(s) Local Injection once  phenylephrine    Infusion 0.2 MICROgram(s)/kG/Min (4.8 mL/Hr) IV Continuous <Continuous>    MEDICATIONS  (PRN):  HYDROmorphone  Injectable 0.5 milliGRAM(s) IV Push every 15 minutes PRN Severe Pain (7 - 10)  HYDROmorphone  Injectable 0.25 milliGRAM(s) IV Push every 15 minutes PRN Moderate Pain (4 - 6)  ondansetron Injectable 4 milliGRAM(s) IV Push once PRN Nausea and/or Vomiting      Physical Exam  Gen: NAD, A&Ox3  Pulm: Nonlabored breathing on 2L NC  CV: NSR on tele  Abd: Soft, NT, ND  Extremities: Ecchymosis to right hip c/w prior fall. Groins soft without appreciable collection, dressings C/D/I. Palpable DP pulses bilaterally, motor/sensory function intact bilaterally    LABS:        RADIOLOGY & ADDITIONAL STUDIES:      A/P: 89y Female several hours s/p EVAR. Recovering appropriately.    Plan  - Diet: regular  - Activity: as tolerated  - Labs: AM  - Pain medication prn  - PACU 6 hours postop then floor  - DVT ppx: SCDs, SQH  - Pritchard out at 24 hours  - Abx 24 hours    Vascular [facility]  [id number]

## 2024-01-22 NOTE — CHART NOTE - NSCHARTNOTEFT_GEN_A_CORE
Requested by RN to remove Right axillary arterial line prior to transfer to floor. Arterial line removed, direct pressure to insertion site applied for 10 minutes.  Gauze and tape dressing applied to insertion site. No swelling, no bleeding, no hematoma present. Palpable radial pulse present after removal of catheter.  Full motor/sensory function intact. Patient to be transferred to floor when PACU criteria met. Requested by RN to remove Right axillary arterial line prior to transfer to floor. Arterial line removed, direct pressure to insertion site applied for 10 minutes.  Gauze and tape dressing applied to insertion site. No swelling, no bleeding, no hematoma present. Palpable radial pulse present after removal of catheter.  Baseline motor/sensory function intact. Patient to be transferred to floor when PACU criteria met.

## 2024-01-22 NOTE — PATIENT PROFILE ADULT - FALL HARM RISK - HARM RISK INTERVENTIONS

## 2024-01-22 NOTE — PRE-ANESTHESIA EVALUATION ADULT - NSRADCARDRESULTSFT_GEN_ALL_CORE
< from: Transthoracic Echocardiogram (04.30.19 @ 17:06) >    Conclusions:  1. Aortic valve not well visualized; appears calcified.  Peak transaortic valve gradient equals 8 mm Hg, estimated  aortic valve area equals 1.8 sqcm (by continuity equation),  aortic valve velocity time integral equals 27 cm,  consistent with mild aortic stenosis. Minimal aortic  regurgitation.  2. Increased relative wall thickness with normal left  ventricular mass index, consistent with concentric left  ventricular remodeling.  3. Endocardium not well visualized; grossly normal left  ventricular systolic function.  4. Moderate diastolic dysfunction (Stage II).  5. The right ventricle is not well visualized; grossly  normal right ventricular systolic function.  6. Estimated right ventricular systolic pressure equals 12  mm Hg, assuming right atrial pressure equals 8 mm Hg,  consistent with normal pulmonary pressures.  *** No previous Echo exam.    < end of copied text >    < from: Cardiac Cath Lab - Adult (10.02.20 @ 18:46) >    CORONARY VESSELS: The coronary circulation is right dominant.  LM:   --  LM: Normal.  LAD:   --  LAD: Angiography showed minor luminal irregularities with no  flow limiting lesions.  CX:   --  OM1: There was a diffuse 95 % stenosis. There was ARGENIS grade 3  flow through the vessel (brisk flow).  RCA:   --  Mid RCA: There was a 20 % stenosis.  COMPLICATIONS: There were no complications.  DIAGNOSTIC RECOMMENDATIONS: 1. Successful PCI to OM1  2. Prior RCA and distal LCx stents are patent  3. Continue DAPT and statin.  INTERVENTIONAL RECOMMENDATIONS: 1. Successful PCI to OM1  2. Prior RCA and distal LCx stents are patent  3. Continue DAPT and statin.  Prepared and signed by  Michael Vee M.D.  Signed 10/03/2020 19:36:11    < end of copied text >

## 2024-01-22 NOTE — PRE-ANESTHESIA EVALUATION ADULT - NSANTHRISKSRD_GEN_ALL_CORE
DATE:  11/19/2020   TIME OF RECEIPT FROM LAB:  1138  LAB TEST:  Platelets  LAB VALUE:  17  TIME LAB VALUE REPORTED TO PROVIDER:   Paged Dr. Beatty at 1149  
ASA of 4, 4E, 5 or 5E

## 2024-01-22 NOTE — PRE-ANESTHESIA EVALUATION ADULT - NSANTHPMHFT_GEN_ALL_CORE
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 Now coming in for Endovascular repair of abdominal aortic aneurysm on 1/22/23

## 2024-01-23 ENCOUNTER — TRANSCRIPTION ENCOUNTER (OUTPATIENT)
Age: 89
End: 2024-01-23

## 2024-01-23 VITALS
SYSTOLIC BLOOD PRESSURE: 184 MMHG | OXYGEN SATURATION: 91 % | DIASTOLIC BLOOD PRESSURE: 71 MMHG | HEART RATE: 88 BPM | RESPIRATION RATE: 18 BRPM | TEMPERATURE: 98 F

## 2024-01-23 LAB
ANION GAP SERPL CALC-SCNC: 12 MMOL/L — SIGNIFICANT CHANGE UP (ref 5–17)
BASOPHILS # BLD AUTO: 0.01 K/UL — SIGNIFICANT CHANGE UP (ref 0–0.2)
BASOPHILS NFR BLD AUTO: 0.1 % — SIGNIFICANT CHANGE UP (ref 0–2)
BUN SERPL-MCNC: 16 MG/DL — SIGNIFICANT CHANGE UP (ref 7–23)
CALCIUM SERPL-MCNC: 8.4 MG/DL — SIGNIFICANT CHANGE UP (ref 8.4–10.5)
CHLORIDE SERPL-SCNC: 108 MMOL/L — SIGNIFICANT CHANGE UP (ref 96–108)
CO2 SERPL-SCNC: 20 MMOL/L — LOW (ref 22–31)
CREAT SERPL-MCNC: 0.81 MG/DL — SIGNIFICANT CHANGE UP (ref 0.5–1.3)
EGFR: 69 ML/MIN/1.73M2 — SIGNIFICANT CHANGE UP
EOSINOPHIL # BLD AUTO: 0.05 K/UL — SIGNIFICANT CHANGE UP (ref 0–0.5)
EOSINOPHIL NFR BLD AUTO: 0.6 % — SIGNIFICANT CHANGE UP (ref 0–6)
GLUCOSE SERPL-MCNC: 130 MG/DL — HIGH (ref 70–99)
HCT VFR BLD CALC: 36.6 % — SIGNIFICANT CHANGE UP (ref 34.5–45)
HGB BLD-MCNC: 11.2 G/DL — LOW (ref 11.5–15.5)
IMM GRANULOCYTES NFR BLD AUTO: 0.4 % — SIGNIFICANT CHANGE UP (ref 0–0.9)
LYMPHOCYTES # BLD AUTO: 0.83 K/UL — LOW (ref 1–3.3)
LYMPHOCYTES # BLD AUTO: 10.2 % — LOW (ref 13–44)
MCHC RBC-ENTMCNC: 29.8 PG — SIGNIFICANT CHANGE UP (ref 27–34)
MCHC RBC-ENTMCNC: 30.6 GM/DL — LOW (ref 32–36)
MCV RBC AUTO: 97.3 FL — SIGNIFICANT CHANGE UP (ref 80–100)
MONOCYTES # BLD AUTO: 0.63 K/UL — SIGNIFICANT CHANGE UP (ref 0–0.9)
MONOCYTES NFR BLD AUTO: 7.7 % — SIGNIFICANT CHANGE UP (ref 2–14)
NEUTROPHILS # BLD AUTO: 6.61 K/UL — SIGNIFICANT CHANGE UP (ref 1.8–7.4)
NEUTROPHILS NFR BLD AUTO: 81 % — HIGH (ref 43–77)
NRBC # BLD: 0 /100 WBCS — SIGNIFICANT CHANGE UP (ref 0–0)
PLATELET # BLD AUTO: 119 K/UL — LOW (ref 150–400)
POTASSIUM SERPL-MCNC: 4.1 MMOL/L — SIGNIFICANT CHANGE UP (ref 3.5–5.3)
POTASSIUM SERPL-SCNC: 4.1 MMOL/L — SIGNIFICANT CHANGE UP (ref 3.5–5.3)
RBC # BLD: 3.76 M/UL — LOW (ref 3.8–5.2)
RBC # FLD: 14.5 % — SIGNIFICANT CHANGE UP (ref 10.3–14.5)
SODIUM SERPL-SCNC: 140 MMOL/L — SIGNIFICANT CHANGE UP (ref 135–145)
WBC # BLD: 8.16 K/UL — SIGNIFICANT CHANGE UP (ref 3.8–10.5)
WBC # FLD AUTO: 8.16 K/UL — SIGNIFICANT CHANGE UP (ref 3.8–10.5)

## 2024-01-23 PROCEDURE — C1769: CPT

## 2024-01-23 PROCEDURE — C1874: CPT

## 2024-01-23 PROCEDURE — 76000 FLUOROSCOPY <1 HR PHYS/QHP: CPT

## 2024-01-23 PROCEDURE — C1725: CPT

## 2024-01-23 PROCEDURE — 85025 COMPLETE CBC W/AUTO DIFF WBC: CPT

## 2024-01-23 PROCEDURE — C1894: CPT

## 2024-01-23 PROCEDURE — 36415 COLL VENOUS BLD VENIPUNCTURE: CPT

## 2024-01-23 PROCEDURE — 86900 BLOOD TYPING SEROLOGIC ABO: CPT

## 2024-01-23 PROCEDURE — C1887: CPT

## 2024-01-23 PROCEDURE — 86923 COMPATIBILITY TEST ELECTRIC: CPT

## 2024-01-23 PROCEDURE — 86901 BLOOD TYPING SEROLOGIC RH(D): CPT

## 2024-01-23 PROCEDURE — 86850 RBC ANTIBODY SCREEN: CPT

## 2024-01-23 PROCEDURE — 80048 BASIC METABOLIC PNL TOTAL CA: CPT

## 2024-01-23 PROCEDURE — C1760: CPT

## 2024-01-23 RX ORDER — ACETAMINOPHEN 500 MG
2 TABLET ORAL
Qty: 0 | Refills: 0 | DISCHARGE
Start: 2024-01-23

## 2024-01-23 RX ADMIN — Medication 25 MILLIGRAM(S): at 05:48

## 2024-01-23 RX ADMIN — HEPARIN SODIUM 5000 UNIT(S): 5000 INJECTION INTRAVENOUS; SUBCUTANEOUS at 05:46

## 2024-01-23 NOTE — DISCHARGE NOTE NURSING/CASE MANAGEMENT/SOCIAL WORK - NSDCPEFALRISK_GEN_ALL_CORE
For information on Fall & Injury Prevention, visit: [email or web address] OR  [email or web address] OR  https://www.cdc.gov/steadi/patient.html

## 2024-01-23 NOTE — DISCHARGE NOTE NURSING/CASE MANAGEMENT/SOCIAL WORK - PATIENT PORTAL LINK FT
You can access the FollowMyHealth Patient Portal offered by [facility] by registering at the following website: [email or web address]. By joining [employer]’s FollowMyHealth portal, you will also be able to view your health information using other applications (apps) compatible with our system.

## 2024-01-23 NOTE — DISCHARGE NOTE PROVIDER - CARE PROVIDER_API CALL
Fran Pedraza  Vascular Surgery  [address], [address]  [place] [address]  Phone: [phone]  Fax: [phone]  Follow Up Time:

## 2024-01-23 NOTE — DISCHARGE NOTE PROVIDER - NSDCFUSCHEDAPPT_GEN_ALL_CORE_FT
Fran Pedraza  [facility] Physician [facility]  VASCULAR [address]  Scheduled Appointment: 02/06/2024

## 2024-01-23 NOTE — PROGRESS NOTE ADULT - SUBJECTIVE AND OBJECTIVE BOX
Subjective: Patient seen and examined bedside  Pain (0-10): 0           Pain Control Adequate: [x] YES [ ] NO   Nausea: [ ] YES [x] NO            Vomiting: [ ] YES [x] NO  Diarrhea: [ ] YES [x] NO         Constipation: [ ] YES [x] NO     Chest Pain: [ ] YES [x] NO    SOB:  [ ] YES [x] NO    MEDICATIONS  (STANDING):  acetaminophen     Tablet .. 650 milliGRAM(s) Oral every 6 hours  chlorhexidine 2% Cloths 1 Application(s) Topical once  heparin   Injectable 5000 Unit(s) SubCutaneous every 8 hours  labetalol Injectable 5 milliGRAM(s) IV Push once  lidocaine 1% Injectable 0.2 milliLiter(s) Local Injection once  metoprolol succinate ER 25 milliGRAM(s) Oral daily    MEDICATIONS  (PRN):    acetaminophen     Tablet .. 650 milliGRAM(s) Oral every 6 hours  chlorhexidine 2% Cloths 1 Application(s) Topical once  heparin   Injectable 5000 Unit(s) SubCutaneous every 8 hours  labetalol Injectable 5 milliGRAM(s) IV Push once  lidocaine 1% Injectable 0.2 milliLiter(s) Local Injection once  metoprolol succinate ER 25 milliGRAM(s) Oral daily    Allergies    IV Contrast (Rash)  latex (Rash)  adhesives (Blisters)    Intolerances    statins (Muscle Pain)        Vital Signs Last 24 Hrs  T(C): 36.8 (23 Jan 2024 05:09), Max: 36.9 (22 Jan 2024 21:18)  T(F): 98.3 (23 Jan 2024 05:09), Max: 98.5 (22 Jan 2024 21:18)  HR: 82 (23 Jan 2024 05:09) (50 - 95)  BP: 159/82 (23 Jan 2024 05:09) (95/48 - 174/66)  BP(mean): 91 (22 Jan 2024 17:00) (69 - 91)  RR: 18 (23 Jan 2024 05:09) (15 - 18)  SpO2: 94% (23 Jan 2024 05:09) (94% - 100%)    Parameters below as of 23 Jan 2024 05:09  Patient On (Oxygen Delivery Method): room air        I&O's Summary    22 Jan 2024 07:01  -  23 Jan 2024 07:00  --------------------------------------------------------  IN: 1757 mL / OUT: 2050 mL / NET: -293 mL        Physical Exam:  General: NAD, resting comfortably  Pulmonary: normal resp effort  Cardiovascular: NSR  Abdominal: soft, NT/ND  Extremities: WWP, normal strength, no clubbing/cyanosis/edema  Neuro: A/O x 3, no focal deficits, sensation normal  Pulses:   Right:                                                                  FEM [x]2+ [ ]1+ [ ]doppler  DP [x]2+ [ ]1+ [ ]doppler  PT[x]2+ [ ]1+ [ ]doppler    Left:  FEM [x]2+ [ ]1+ [ ]doppler     DP [x]2+ [ ]1+ [ ]doppler  PT [x]2+ [ ]1+ [ ]doppler    Lines/drains/tubes:    LABS:    01-23    140  |  108  |  16  ----------------------------<  130<H>  4.1   |  20<L>  |  0.81    Ca    8.4      23 Jan 2024 06:59        Urinalysis Basic - ( 23 Jan 2024 06:59 )    Color: x / Appearance: x / SG: x / pH: x  Gluc: 130 mg/dL / Ketone: x  / Bili: x / Urobili: x   Blood: x / Protein: x / Nitrite: x   Leuk Esterase: x / RBC: x / WBC x   Sq Epi: x / Non Sq Epi: x / Bacteria: x        CAPILLARY BLOOD GLUCOSE          RADIOLOGY & ADDITIONAL TESTS:

## 2024-01-23 NOTE — DISCHARGE NOTE PROVIDER - NSDCCPCAREPLAN_GEN_ALL_CORE_FT
PRINCIPAL DISCHARGE DIAGNOSIS  Diagnosis: AAA (abdominal aortic aneurysm)  Assessment and Plan of Treatment: WOUND CARE: Remove dressings tomorrow 1/24. keep incisions clean/dry/intact.  BATHING: Please do not submerge wound underwater. You may shower and/or sponge bathe.  ACTIVITY: No heavy lifting anything more than 10-15lbs or straining. Otherwise, you may return to your usual level of physical activity. If you are taking narcotic pain medication (such as Percocet), do NOT drive a car, operate machinery or make important decisions.  NOTIFY YOUR SURGEON IF: Notify your surgeon and/or return to ER for temperatures greater than 101, chills sweats, pain not controlled with pain medications, significant swelling and/or skin changes in tone and/or color, significant numbness and/or tingling, significant weakness of extremity, persistent nausea and vomiting, or acutely concerning matters to you, that may require urgent medical attention.   FOLLOW-UP:  1. Please call to make a follow-up appointment within one week of discharge with Dr. Pedraza.  2. Please follow up with your primary care physician in one week regarding your hospitalization.

## 2024-01-23 NOTE — PROGRESS NOTE ADULT - ASSESSMENT
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 Now s/p Endovascular repair of abdominal aortic aneurysm on 1/22/23.    PLAN:  - Dc brandon, follow TOV  - Reg diet  - Continue home meds  - Monitor I/Os  - DVTppx: [facility]    Vascular surgery [id number] complains of pain/discomfort

## 2024-01-23 NOTE — DISCHARGE NOTE PROVIDER - NSDCMRMEDTOKEN_GEN_ALL_CORE_FT
acetaminophen 325 mg oral tablet: 2 tab(s) orally every 6 hours  Aspirin Enteric Coated 81 mg oral delayed release tablet: 1 tab(s) orally once a day  isosorbide mononitrate 60 mg oral tablet, extended release: 1 tab(s) orally once a day  metoprolol succinate 25 mg oral capsule, extended release: 1 cap(s) orally once a day  nitroglycerin 0.4 mg sublingual tablet: 1 tab(s) sublingually once a day as needed for  chest pain  Repatha 140 mg/mL subcutaneous solution: 140 milligram(s) subcutaneously every 2 weeks

## 2024-01-23 NOTE — DISCHARGE NOTE PROVIDER - HOSPITAL COURSE
89 yr old female with history of HTN, 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 Now coming in for Endovascular repair of abdominal aortic aneurysm on 1/3/2024.    Pt had recent admission to ED in [place] with epigastric pain ( 11/21/2023) , worked up for PE , CT chest & abdomen were negative along with cardiac enzymes . Pt saw cardio on 12/1/2023- Pt to have stress test on 12/18/2023 - results will be in All scripts. On 1/22, patient underwent EVAR. Patient tolerated procedure well, extubated and sent to pacu stable then floor.  POD1 taylor dc and passed TOV. At the time of discharge, the patient was hemodynamically stable, was tolerating PO diet, was voiding urine and passing stool, was ambulating, and was comfortable with adequate pain control. The patient was instructed to follow up with Dr. Pedraza within 1-2 weeks after discharge from the hospital. The patient/family felt comfortable with discharge. The patient was discharged to home. The patient had no other issues.

## 2024-02-06 ENCOUNTER — APPOINTMENT (OUTPATIENT)
Dept: VASCULAR SURGERY | Facility: CLINIC | Age: 89
End: 2024-02-06
Payer: MEDICARE

## 2024-02-06 PROCEDURE — 99024 POSTOP FOLLOW-UP VISIT: CPT

## 2024-02-06 NOTE — REASON FOR VISIT
[de-identified] : Endovascular repair abdominal aortic aneurysm [de-identified] : Status post endovascular repair of abdominal aortic aneurysm.  Doing well.  Patient is without complaints.  The groins appear to have well-healed puncture sites.  Patient to follow-up in 6 weeks.

## 2024-02-12 NOTE — ED PROVIDER NOTE - CPE EDP PSYCH NORM
1943 Patients sats 88% on room air. Sats up to 98% after placing patient on 1 lpm per N/C.  crt   normal...

## 2024-02-15 ENCOUNTER — RX RENEWAL (OUTPATIENT)
Age: 89
End: 2024-02-15

## 2024-02-15 RX ORDER — METOPROLOL SUCCINATE 25 MG/1
25 TABLET, EXTENDED RELEASE ORAL
Qty: 90 | Refills: 3 | Status: ACTIVE | COMMUNITY
Start: 2020-12-18 | End: 1900-01-01

## 2024-03-01 ENCOUNTER — APPOINTMENT (OUTPATIENT)
Dept: CARDIOLOGY | Facility: CLINIC | Age: 89
End: 2024-03-01
Payer: MEDICARE

## 2024-03-01 ENCOUNTER — NON-APPOINTMENT (OUTPATIENT)
Age: 89
End: 2024-03-01

## 2024-03-01 VITALS
WEIGHT: 142 LBS | HEIGHT: 63 IN | OXYGEN SATURATION: 98 % | HEART RATE: 74 BPM | BODY MASS INDEX: 25.16 KG/M2 | DIASTOLIC BLOOD PRESSURE: 80 MMHG | SYSTOLIC BLOOD PRESSURE: 197 MMHG

## 2024-03-01 DIAGNOSIS — I10 ESSENTIAL (PRIMARY) HYPERTENSION: ICD-10-CM

## 2024-03-01 PROCEDURE — G2211 COMPLEX E/M VISIT ADD ON: CPT

## 2024-03-01 PROCEDURE — 99214 OFFICE O/P EST MOD 30 MIN: CPT

## 2024-03-01 PROCEDURE — 93000 ELECTROCARDIOGRAM COMPLETE: CPT

## 2024-03-01 NOTE — HISTORY OF PRESENT ILLNESS
[FreeTextEntry1] : Adry presented to the office today for a followup evaluation. She was last seen in the office in .  She was more recently hospitalized.  She is now 89 years old, with a history of bladder cancer. She has a history of  hypertension, with meds discontinued when her pressure dropped. In May of 2016, she had an episode of syncope.  While hospitalized, she was observed to have nonsustained ventricular tachycardia. She was transferred for further evaluation. Nuclear stress testing and echocardiography were unremarkable. A loop recorder was implanted, and more recently explanted. She has a AAA, which has been followed conservatively until recently when she had a repair. She has had a myocardial infarction, for which she had PCI of the circumflex and the right coronary artery.   She presented in  with chest discomfort.   She described the symptoms as being similar but not identical to the symptoms which she was experiencing with her myocardial infarction.  Nuclear stress testing revealed fixed inferior and inferolateral defects, suggestive of infarct, with an ejection fraction of 45%. We decided to try to medically manage. Unfortunately, at the end of  she presented to the hospital with unstable angina. Cardiac catheterization revealed a severe stenosis distal within a small caliber obtuse marginal, which does serve a relatively significant myocardial territory. It was felt most appropriate to try medical therapy, with PCI reserved only for failure of medical therapy.  In early , she reported chest discomfort suspicious for angina. She was referred to the emergency room, and had PCI of the circumflex. Shortly afterward her   after a long illness.  She was seen in the ER with atypical chest pain after that and was discharged after several sets of enzymes.  At the time of visit in , she was feeling well.  Based on an ultrasound and a noncontrast CT, we felt that her aorta had increased significantly in size, in the range of 5 cm.  I referred her to vascular surgery.  A contrast CT revealed that the aorta was only 4.6 cm.  I saw her again in , and she was feeling well.  She had a great grandson recently at that time, and he was doing very well. I saw her again in  and she was feeling well.  I suggested an echocardiogram and a carotid ultrasound. These came out well.  She saw the surgeon in , and another aortic US was performed.  Her aortic dimensions increased from 4.9 to 5.1 cm.  I saw her in the office in , and she was feeling well at that time.  She had then recently presented to the emergency department with atypical chest discomfort.  I felt that this was noncardiac.  She has been feeling well recently.  She saw vascular surgery on , and follow-up imaging of her abdominal aortic aneurysm was performed.  CTA of the abdominal aorta was performed 2023.  This revealed that a smaller fusiform aortic aneurysm was noted, which was unchanged.  About 2 cm caudally, and a second fusiform infrarenal abdominal aortic aneurysm was noted, terminating just above the bifurcation, measuring up to 5.2 x 5.6 cm, having measured 4.6 x 4.9 cm on 2022.  She was planned for aortic intervention.  She is now status post endovascular repair of her abdominal aortic aneurysm, which was uncomplicated.  She did have some labile blood pressures following the procedure, which may have been artifactual from a machine that was not working properly.  She has been feeling generally well from a cardiovascular perspective.  She has been under significant emotional stress, primarily because of financial issues.  They have been in the final stages of selling her house, which is absolutely necessary given the financial issues involved.  However, she is quite stressed because of it.  In that context she has had very labile blood pressures.  Although at times her blood pressures are in the 130s to 140s, at other times they are in the 180s and 190s.

## 2024-03-01 NOTE — DISCUSSION/SUMMARY
[FreeTextEntry1] : Adry is doing well from a cardiovascular perspective. She has no recurrent symptoms of angina. I will make no changes to her medications at this time.  Her blood pressure is very elevated here, but this has been the case previously.  Typically her blood pressure control at home is excellent, though things are much more labile now.  I think it is best that we pursue 24-hour blood pressure monitoring so that we can be certain that we are not missing some essential hypertension which would warrant much more aggressive treatment.    I reviewed her echocardiogram from July 14, 2022. This revealed an ejection fraction of 57% with mild mitral regurgitation, moderate aortic stenosis with a calculated aortic valve area of 1.2 cm. She was found to have segmental wall motion of normalities of the basal and mid inferolateral wall. I reviewed her carotid ultrasound from July 14, 2022. This revealed moderate bilateral atherosclerosis without evidence of hemodynamically significant stenosis.  Echocardiography was performed July 28, 2023.  This revealed a normal ejection fraction with proximal to mid inferolateral hypokinesis.  There was mild mitral regurgitation and moderate aortic stenosis.  Catheterization was performed in April 2019.  This revealed no significant disease of the left main of the LAD.  There was a severe stenosis of the mid circumflex, and a severe stenosis of the mid RCA.  The mid RCA had PCI performed.  Catheterization in November 2019 stable disease of the circumflex, for which medical therapy was recommended.  Catheterization in October 2020 in the setting of unstable angina resulted in PCI of the circumflex.  This testing was performed December 18, 2023.  This revealed a medium sized, severe defect in the basal and mid lateral and proximal to mid inferolateral walls, suggestive of infarct.  The overall ejection fraction was 37%.  I reviewed her more recent hospital course.  Hopefully, she will be able to visit with me again in about 3 months.  If her blood pressure requires a great deal of additional treatment, she will see me sooner.

## 2024-03-07 ENCOUNTER — APPOINTMENT (OUTPATIENT)
Dept: VASCULAR SURGERY | Facility: CLINIC | Age: 89
End: 2024-03-07
Payer: MEDICARE

## 2024-03-07 PROCEDURE — 99024 POSTOP FOLLOW-UP VISIT: CPT

## 2024-03-07 PROCEDURE — 93978 VASCULAR STUDY: CPT

## 2024-03-07 NOTE — REASON FOR VISIT
[de-identified] : Endovascular repair abdominal aortic aneurysm [de-identified] : Status post endovascular repair of abdominal aortic aneurysm.  Doing well.  Patient is without complaints.  The groins appear to have well-healed puncture sites. duplex shows no endoleak f/u 6 months

## 2024-03-08 ENCOUNTER — APPOINTMENT (OUTPATIENT)
Dept: CARDIOLOGY | Facility: CLINIC | Age: 89
End: 2024-03-08
Payer: MEDICARE

## 2024-03-08 PROCEDURE — 93790 AMBL BP MNTR W/SW I&R: CPT

## 2024-06-11 ENCOUNTER — APPOINTMENT (OUTPATIENT)
Dept: CARDIOLOGY | Facility: CLINIC | Age: 89
End: 2024-06-11
Payer: MEDICARE

## 2024-06-11 ENCOUNTER — NON-APPOINTMENT (OUTPATIENT)
Age: 89
End: 2024-06-11

## 2024-06-11 VITALS
HEART RATE: 66 BPM | OXYGEN SATURATION: 96 % | WEIGHT: 140 LBS | SYSTOLIC BLOOD PRESSURE: 160 MMHG | DIASTOLIC BLOOD PRESSURE: 70 MMHG | HEIGHT: 63 IN | BODY MASS INDEX: 24.8 KG/M2

## 2024-06-11 DIAGNOSIS — I71.40 ABDOMINAL AORTIC ANEURYSM, WITHOUT RUPTURE, UNSPECIFIED: ICD-10-CM

## 2024-06-11 DIAGNOSIS — I25.10 ATHEROSCLEROTIC HEART DISEASE OF NATIVE CORONARY ARTERY W/OUT ANGINA PECTORIS: ICD-10-CM

## 2024-06-11 DIAGNOSIS — I10 ESSENTIAL (PRIMARY) HYPERTENSION: ICD-10-CM

## 2024-06-11 PROCEDURE — 99214 OFFICE O/P EST MOD 30 MIN: CPT

## 2024-06-11 PROCEDURE — G2211 COMPLEX E/M VISIT ADD ON: CPT

## 2024-06-11 PROCEDURE — 93000 ELECTROCARDIOGRAM COMPLETE: CPT

## 2024-06-11 RX ORDER — AMLODIPINE BESYLATE 5 MG/1
5 TABLET ORAL
Qty: 90 | Refills: 3 | Status: ACTIVE | COMMUNITY
Start: 2024-03-15 | End: 1900-01-01

## 2024-06-11 NOTE — DISCUSSION/SUMMARY
[FreeTextEntry1] : Adry is doing well from a cardiovascular perspective. She has no recurrent symptoms of angina.    Her blood pressure is very elevated here, and this has been the case previously.  A 24-hour blood pressure monitor was performed in March 2024.  This revealed an average 24-hour blood pressure of 149/68.  Although she was resistant to adding amlodipine, she is now agreeable to trying it.  She will start amlodipine 5 mg daily.  She will send a list of blood pressures in about 2 weeks.    I reviewed her echocardiogram from July 14, 2022. This revealed an ejection fraction of 57% with mild mitral regurgitation, moderate aortic stenosis with a calculated aortic valve area of 1.2 cm. She was found to have segmental wall motion of normalities of the basal and mid inferolateral wall. I reviewed her carotid ultrasound from July 14, 2022. This revealed moderate bilateral atherosclerosis without evidence of hemodynamically significant stenosis.  Echocardiography was performed July 28, 2023.  This revealed a normal ejection fraction with proximal to mid inferolateral hypokinesis.  There was mild mitral regurgitation and moderate aortic stenosis.  Catheterization was performed in April 2019.  This revealed no significant disease of the left main of the LAD.  There was a severe stenosis of the mid circumflex, and a severe stenosis of the mid RCA.  The mid RCA had PCI performed.  Catheterization in November 2019 stable disease of the circumflex, for which medical therapy was recommended.  Catheterization in October 2020 in the setting of unstable angina resulted in PCI of the circumflex.  This testing was performed December 18, 2023.  This revealed a medium sized, severe defect in the basal and mid lateral and proximal to mid inferolateral walls, suggestive of infarct.  The overall ejection fraction was 37%.  I reviewed her more recent hospital course.  Hopefully, she will be able to visit with me again in about 3 months.  If her blood pressure requires a great deal of additional treatment, she will see me sooner. [EKG obtained to assist in diagnosis and management of assessed problem(s)] : EKG obtained to assist in diagnosis and management of assessed problem(s)

## 2024-06-11 NOTE — PHYSICAL EXAM
[Normal Appearance] : normal appearance [General Appearance - In No Acute Distress] : no acute distress [Normal Conjunctiva] : the conjunctiva exhibited no abnormalities [Normal Oral Mucosa] : normal oral mucosa [Normal Jugular Venous V Waves Present] : normal jugular venous V waves present [Respiration, Rhythm And Depth] : normal respiratory rhythm and effort [Exaggerated Use Of Accessory Muscles For Inspiration] : no accessory muscle use [Auscultation Breath Sounds / Voice Sounds] : lungs were clear to auscultation bilaterally [Abdomen Soft] : soft [Abdomen Tenderness] : non-tender [Abdomen Mass (___ Cm)] : no abdominal mass palpated [Abnormal Walk] : normal gait [Nail Clubbing] : no clubbing of the fingernails [Cyanosis, Localized] : no localized cyanosis [Petechial Hemorrhages (___cm)] : no petechial hemorrhages [Skin Color & Pigmentation] : normal skin color and pigmentation [] : no rash [No Venous Stasis] : no venous stasis [Skin Lesions] : no skin lesions [Oriented To Time, Place, And Person] : oriented to person, place, and time [Mood] : the mood was normal [Affect] : the affect was normal [No Anxiety] : not feeling anxious [Not Palpable] : not palpable [No Precordial Heave] : no precordial heave was noted [Normal Rate] : normal [Rhythm Regular] : regular [Normal S1] : normal S1 [Normal S2] : normal S2 [No Gallop] : no gallop heard [No Murmur] : no murmurs heard [Right Carotid Bruit] : right carotid bruit heard [Left Carotid Bruit] : left carotid bruit heard [1+] : left 1+ [___ +] : bilateral [unfilled]U+ pretibial pitting edema [FreeTextEntry1] : No JVD [Bruit] : no bruit heard

## 2024-06-11 NOTE — HISTORY OF PRESENT ILLNESS
[FreeTextEntry1] : Adry presented to the office today for a followup evaluation.  I saw her in the office about 3 months ago.  She is now 89 years old, with a history of bladder cancer. She has a history of  hypertension, with meds discontinued when her pressure dropped. In May of 2016, she had an episode of syncope.  While hospitalized, she was observed to have nonsustained ventricular tachycardia. She was transferred for further evaluation. Nuclear stress testing and echocardiography were unremarkable. A loop recorder was implanted, and more recently explanted. She has a AAA, which has been followed conservatively until recently when she had a repair. She has had a myocardial infarction, for which she had PCI of the circumflex and the right coronary artery.   She presented in  with chest discomfort.   She described the symptoms as being similar but not identical to the symptoms which she was experiencing with her myocardial infarction.  Nuclear stress testing revealed fixed inferior and inferolateral defects, suggestive of infarct, with an ejection fraction of 45%. We decided to try to medically manage. Unfortunately, at the end of  she presented to the hospital with unstable angina. Cardiac catheterization revealed a severe stenosis distal within a small caliber obtuse marginal, which does serve a relatively significant myocardial territory. It was felt most appropriate to try medical therapy, with PCI reserved only for failure of medical therapy.  In early , she reported chest discomfort suspicious for angina. She was referred to the emergency room, and had PCI of the circumflex. Shortly afterward her   after a long illness.  She was seen in the ER with atypical chest pain after that and was discharged after several sets of enzymes.  At the time of visit in , she was feeling well.  Based on an ultrasound and a noncontrast CT, we felt that her aorta had increased significantly in size, in the range of 5 cm.  I referred her to vascular surgery.  A contrast CT revealed that the aorta was only 4.6 cm.  I saw her again in , and she was feeling well.  She had a great grandson recently at that time, and he was doing very well. I saw her again in  and she was feeling well.  I suggested an echocardiogram and a carotid ultrasound. These came out well.  She saw the surgeon in , and another aortic US was performed.  Her aortic dimensions increased from 4.9 to 5.1 cm.  I saw her in the office in , and she was feeling well at that time.  She had then recently presented to the emergency department with atypical chest discomfort.  I felt that this was noncardiac.  She has been feeling well recently.  She saw vascular surgery on , and follow-up imaging of her abdominal aortic aneurysm was performed.  CTA of the abdominal aorta was performed 2023.  This revealed that a smaller fusiform aortic aneurysm was noted, which was unchanged.  About 2 cm caudally, and a second fusiform infrarenal abdominal aortic aneurysm was noted, terminating just above the bifurcation, measuring up to 5.2 x 5.6 cm, having measured 4.6 x 4.9 cm on 2022.  She was planned for aortic intervention.  She is now status post endovascular repair of her abdominal aortic aneurysm, which was uncomplicated.  She did have some labile blood pressures following the procedure, which may have been artifactual from a machine that was not working properly.  At the time of her visit in  she was doing physically well, but was quite emotionally stressed because of financial issues.  She was in the final stages of selling her house.  Her blood pressure was elevated, and essential hypertension was confirmed on 24-hour blood pressure monitoring.  She was advised to start amlodipine 5 mg daily.  Her blood pressure seemingly came down on its own, and it was not started.  She has been feeling generally well from a cardiovascular perspective.  She remains under significant emotional stress, primarily because of financial issues.  They have been in the final stages of selling her house, which is absolutely necessary given the financial issues involved.  However, she is quite stressed because of it.  In that context she has had very labile blood pressures.  Her blood pressure at home this morning was 150.

## 2024-06-12 LAB
ALBUMIN SERPL ELPH-MCNC: 4.5 G/DL
ALP BLD-CCNC: 75 U/L
ALT SERPL-CCNC: 9 U/L
ANION GAP SERPL CALC-SCNC: 14 MMOL/L
AST SERPL-CCNC: 20 U/L
BILIRUB SERPL-MCNC: 0.4 MG/DL
BUN SERPL-MCNC: 28 MG/DL
CALCIUM SERPL-MCNC: 9.6 MG/DL
CHLORIDE SERPL-SCNC: 102 MMOL/L
CHOLEST SERPL-MCNC: 141 MG/DL
CO2 SERPL-SCNC: 25 MMOL/L
CREAT SERPL-MCNC: 0.9 MG/DL
EGFR: 61 ML/MIN/1.73M2
ESTIMATED AVERAGE GLUCOSE: 134 MG/DL
GLUCOSE SERPL-MCNC: 107 MG/DL
HBA1C MFR BLD HPLC: 6.3 %
HCT VFR BLD CALC: 39 %
HDLC SERPL-MCNC: 70 MG/DL
HGB BLD-MCNC: 12.4 G/DL
LDLC SERPL CALC-MCNC: 56 MG/DL
MCHC RBC-ENTMCNC: 29.2 PG
MCHC RBC-ENTMCNC: 31.8 GM/DL
MCV RBC AUTO: 92 FL
NONHDLC SERPL-MCNC: 71 MG/DL
PLATELET # BLD AUTO: 164 K/UL
POTASSIUM SERPL-SCNC: 4.3 MMOL/L
PROT SERPL-MCNC: 7.1 G/DL
RBC # BLD: 4.24 M/UL
RBC # FLD: 14.6 %
SODIUM SERPL-SCNC: 141 MMOL/L
TRIGL SERPL-MCNC: 79 MG/DL
TSH SERPL-ACNC: 2.21 UIU/ML
WBC # FLD AUTO: 7.06 K/UL

## 2024-06-13 RX ORDER — ALPRAZOLAM 0.25 MG/1
0.25 TABLET ORAL
Qty: 14 | Refills: 0 | Status: ACTIVE | COMMUNITY
Start: 2024-06-13 | End: 1900-01-01

## 2024-07-30 NOTE — PATIENT PROFILE ADULT - PRIMARY ROLES/RESPONSIBILITIES
Quality 130: Documentation Of Current Medications In The Medical Record: Current Medications Documented Quality 431: Preventive Care And Screening: Unhealthy Alcohol Use - Screening: Patient not identified as an unhealthy alcohol user when screened for unhealthy alcohol use using a systematic screening method Detail Level: Detailed Quality 134: Screening For Clinical Depression And Follow-Up Plan: Depression Screening not Completed, for documented patient or medical reasons Quality 226: Preventive Care And Screening: Tobacco Use: Screening And Cessation Intervention: Patient screened for tobacco use and is an ex/non-smoker retired

## 2024-08-13 ENCOUNTER — RX RENEWAL (OUTPATIENT)
Age: 89
End: 2024-08-13

## 2024-09-10 ENCOUNTER — APPOINTMENT (OUTPATIENT)
Dept: VASCULAR SURGERY | Facility: CLINIC | Age: 89
End: 2024-09-10
Payer: MEDICARE

## 2024-09-10 PROCEDURE — 99214 OFFICE O/P EST MOD 30 MIN: CPT

## 2024-09-10 PROCEDURE — 93978 VASCULAR STUDY: CPT

## 2024-09-10 NOTE — HISTORY OF PRESENT ILLNESS
[FreeTextEntry1] : 89-year-old female with a history of bladder cancer, hypertension, coronary artery disease status post PCI approximately 6 months ago patient underwent endovascular pair of abdominal aortic aneurysm.  Since that time she has been doing well.  She has no complaints.  She presents for routine follow-up.

## 2024-09-10 NOTE — ASSESSMENT
[FreeTextEntry1] : 89-year-old female with multiple medical problems including coronary artery disease status post PCI.  In the office today patient underwent a duplex study which shows a patent endograft with no evidence of endoleak.  She is currently doing well.  She should follow-up with me in 6 months for repeat duplex study.

## 2024-09-10 NOTE — PHYSICAL EXAM
[Normal Breath Sounds] : Normal breath sounds [Normal Rate and Rhythm] : normal rate and rhythm [2+] : left 2+ [No Rash or Lesion] : No rash or lesion [Alert] : alert [Calm] : calm [JVD] : no jugular venous distention  [Ankle Swelling (On Exam)] : not present [Varicose Veins Of Lower Extremities] : not present [] : not present [Abdomen Tenderness] : ~T ~M No abdominal tenderness [de-identified] : Appears well

## 2024-09-26 NOTE — DISCUSSION/SUMMARY
PAST SURGICAL HISTORY:  S/P CABG (coronary artery bypass graft)      [FreeTextEntry1] : Adry is doing well from a cardiovascular perspective. She has no recurrent symptoms of angina. I will make no changes to her medications at this time.\par \par Her blood pressure was quite elevated here, but this has been the case previously. We will rely upon her blood pressures from home.\par  \par Her abdominal aorta dimensions are somewhere between 4.6 and 5 cm by CT.  The CT with contrast revealed the smaller dimensions.  We will rely upon that measurement for now.  She has an appointment to see the vascular surgeon in February.  Hopefully, everything will remain stable on their ultrasound.\par \par I suggested a follow-up in about 6 months.  She will have blood work done today.

## 2024-10-10 NOTE — PATIENT PROFILE ADULT - ARE ANY OF THE ITEMS ON THE CHART
Please address the medication refill and close the encounter.  If I can be of assistance, please route to the applicable pool.      Thank you.   no

## 2025-02-06 ENCOUNTER — NON-APPOINTMENT (OUTPATIENT)
Age: 89
End: 2025-02-06

## 2025-02-06 ENCOUNTER — APPOINTMENT (OUTPATIENT)
Dept: CARDIOLOGY | Facility: CLINIC | Age: 89
End: 2025-02-06
Payer: MEDICARE

## 2025-02-06 VITALS
HEART RATE: 74 BPM | OXYGEN SATURATION: 96 % | SYSTOLIC BLOOD PRESSURE: 189 MMHG | BODY MASS INDEX: 24.98 KG/M2 | HEIGHT: 63 IN | WEIGHT: 141 LBS | DIASTOLIC BLOOD PRESSURE: 70 MMHG

## 2025-02-06 DIAGNOSIS — I25.10 ATHEROSCLEROTIC HEART DISEASE OF NATIVE CORONARY ARTERY W/OUT ANGINA PECTORIS: ICD-10-CM

## 2025-02-06 DIAGNOSIS — I71.40 ABDOMINAL AORTIC ANEURYSM, WITHOUT RUPTURE, UNSPECIFIED: ICD-10-CM

## 2025-02-06 PROCEDURE — 93000 ELECTROCARDIOGRAM COMPLETE: CPT

## 2025-02-06 PROCEDURE — 99214 OFFICE O/P EST MOD 30 MIN: CPT | Mod: 25

## 2025-02-06 NOTE — PATIENT PROFILE ADULT - NSPROPTRIGHTBILLOFRIGHTS_GEN_A_NUR
----- Message from Se Nixon CNM sent at 2/6/2025 12:56 PM CST -----  IMPRESSION:  1.  Bilateral ovarian lesions. Differential would include hemorrhagic cysts  versus endometriomas. Follow-up ultrasound in 6-8 weeks recommended.  2.  Endometrium is thickened and heterogeneous. Follow-up or further  evaluation recommended    Abnormal ultrasound results, based on these findings I suggest she follow up with OBGYN as they may want her to have additional testing that CNM cannot perform.    patient

## 2025-03-07 ENCOUNTER — APPOINTMENT (OUTPATIENT)
Dept: CARDIOLOGY | Facility: CLINIC | Age: 89
End: 2025-03-07
Payer: MEDICARE

## 2025-03-07 PROCEDURE — 93306 TTE W/DOPPLER COMPLETE: CPT

## 2025-03-07 PROCEDURE — 93880 EXTRACRANIAL BILAT STUDY: CPT

## 2025-03-10 ENCOUNTER — RX RENEWAL (OUTPATIENT)
Age: 89
End: 2025-03-10

## 2025-03-10 LAB
25(OH)D3 SERPL-MCNC: 77.6 NG/ML
FOLATE SERPL-MCNC: >20 NG/ML
VIT B12 SERPL-MCNC: 1438 PG/ML

## 2025-03-11 ENCOUNTER — APPOINTMENT (OUTPATIENT)
Dept: VASCULAR SURGERY | Facility: CLINIC | Age: 89
End: 2025-03-11
Payer: MEDICARE

## 2025-03-11 PROCEDURE — 93978 VASCULAR STUDY: CPT

## 2025-03-11 PROCEDURE — 99213 OFFICE O/P EST LOW 20 MIN: CPT

## 2025-06-12 ENCOUNTER — APPOINTMENT (OUTPATIENT)
Dept: CARDIOLOGY | Facility: CLINIC | Age: 89
End: 2025-06-12
Payer: MEDICARE

## 2025-06-12 VITALS
WEIGHT: 141 LBS | SYSTOLIC BLOOD PRESSURE: 198 MMHG | BODY MASS INDEX: 24.98 KG/M2 | HEART RATE: 76 BPM | DIASTOLIC BLOOD PRESSURE: 73 MMHG | HEIGHT: 63 IN | OXYGEN SATURATION: 95 %

## 2025-06-12 PROCEDURE — 93000 ELECTROCARDIOGRAM COMPLETE: CPT

## 2025-06-12 PROCEDURE — 99214 OFFICE O/P EST MOD 30 MIN: CPT | Mod: 25

## 2025-06-13 LAB
25(OH)D3 SERPL-MCNC: 56.2 NG/ML
ALBUMIN SERPL ELPH-MCNC: 4.2 G/DL
ALP BLD-CCNC: 86 U/L
ALT SERPL-CCNC: 16 U/L
ANION GAP SERPL CALC-SCNC: 16 MMOL/L
AST SERPL-CCNC: 20 U/L
BILIRUB SERPL-MCNC: 0.3 MG/DL
BUN SERPL-MCNC: 22 MG/DL
CALCIUM SERPL-MCNC: 9.1 MG/DL
CHLORIDE SERPL-SCNC: 105 MMOL/L
CHOLEST SERPL-MCNC: 208 MG/DL
CO2 SERPL-SCNC: 21 MMOL/L
CREAT SERPL-MCNC: 0.89 MG/DL
EGFRCR SERPLBLD CKD-EPI 2021: 61 ML/MIN/1.73M2
ESTIMATED AVERAGE GLUCOSE: 128 MG/DL
FOLATE SERPL-MCNC: >20 NG/ML
GLUCOSE SERPL-MCNC: 117 MG/DL
HBA1C MFR BLD HPLC: 6.1 %
HCT VFR BLD CALC: 38.8 %
HDLC SERPL-MCNC: 51 MG/DL
HGB BLD-MCNC: 12.1 G/DL
LDLC SERPL-MCNC: 132 MG/DL
MCHC RBC-ENTMCNC: 29.8 PG
MCHC RBC-ENTMCNC: 31.2 G/DL
MCV RBC AUTO: 95.6 FL
NONHDLC SERPL-MCNC: 156 MG/DL
PLATELET # BLD AUTO: 196 K/UL
POTASSIUM SERPL-SCNC: 4.3 MMOL/L
PROT SERPL-MCNC: 6.6 G/DL
RBC # BLD: 4.06 M/UL
RBC # FLD: 13.8 %
SODIUM SERPL-SCNC: 142 MMOL/L
TRIGL SERPL-MCNC: 135 MG/DL
VIT B12 SERPL-MCNC: 1393 PG/ML
WBC # FLD AUTO: 6.18 K/UL

## 2025-06-16 ENCOUNTER — RX RENEWAL (OUTPATIENT)
Age: 89
End: 2025-06-16

## 2025-08-12 ENCOUNTER — APPOINTMENT (OUTPATIENT)
Dept: CARDIOLOGY | Facility: CLINIC | Age: 89
End: 2025-08-12

## 2025-09-08 ENCOUNTER — RESULT REVIEW (OUTPATIENT)
Age: 89
End: 2025-09-08

## 2025-09-10 ENCOUNTER — TRANSCRIPTION ENCOUNTER (OUTPATIENT)
Age: 89
End: 2025-09-10

## (undated) DEVICE — DRAPE 3/4 SHEET W REINFORCEMENT 56X77"

## (undated) DEVICE — SUT SOFSILK 3-0 18" TIES

## (undated) DEVICE — DRAPE EQUIPMENT BANDED BAG 30 X 30" (SHOWER CAP)

## (undated) DEVICE — SPECIMEN CONTAINER 100ML

## (undated) DEVICE — GOWN TRIMAX LG

## (undated) DEVICE — GLV 6.5 PROTEXIS (WHITE)

## (undated) DEVICE — DRAPE MAYO STAND 30"

## (undated) DEVICE — DRSG STERISTRIPS 0.5 X 4"

## (undated) DEVICE — FOLEY TRAY 16FR 5CC LTX UMETER CLOSED

## (undated) DEVICE — DRAPE 1/2 SHEET 40X57"

## (undated) DEVICE — Device

## (undated) DEVICE — BLADE SCALPEL SAFETYLOCK #15

## (undated) DEVICE — SUT POLYSORB 2-0 30" GS-21 UNDYED

## (undated) DEVICE — GLV 8.5 PROTEXIS (WHITE)

## (undated) DEVICE — GLV 7 PROTEXIS (WHITE)

## (undated) DEVICE — VENODYNE/SCD SLEEVE CALF LARGE

## (undated) DEVICE — PACK FEM/POP

## (undated) DEVICE — TAPE GLO-N-TELL RADIOPAQUE 20 STRIPS

## (undated) DEVICE — SUT PROLENE 6-0 4-30" C-1

## (undated) DEVICE — SOL IRR POUR H2O 250ML

## (undated) DEVICE — TORQUE DEVICE FOR GUIDEWIRE 0.0100.038"

## (undated) DEVICE — VESSEL LOOP MINI-BLUE 0.075" X 16"

## (undated) DEVICE — SUT PDS II 1 27" CT

## (undated) DEVICE — GLV 8 PROTEXIS (WHITE)

## (undated) DEVICE — NDL PERC BASEPLT 18GX7CM

## (undated) DEVICE — DRAPE IOBAN 23" X 23"

## (undated) DEVICE — SOL IRR POUR NS 0.9% 500ML

## (undated) DEVICE — GLV 7.5 PROTEXIS (WHITE)

## (undated) DEVICE — POSITIONER FOAM EGG CRATE ULNAR 2PCS (PINK)

## (undated) DEVICE — SOL INJ NS 0.9% 1000ML

## (undated) DEVICE — SUT BIOSYN 4-0 18" P-12

## (undated) DEVICE — BLADE SCALPEL SAFETYLOCK #10

## (undated) DEVICE — SUT SOFSILK 4-0 18" TIES

## (undated) DEVICE — SUCTION YANKAUER NO CONTROL VENT

## (undated) DEVICE — PREP CHLORAPREP HI-LITE ORANGE 26ML

## (undated) DEVICE — SUT PROLENE 6-0 4-30" BV-1

## (undated) DEVICE — DRAPE TOWEL BLUE 17" X 24"

## (undated) DEVICE — PACK BASIN SPECIAL PROCEDURE

## (undated) DEVICE — STAPLER SKIN VISI-STAT 35 WIDE

## (undated) DEVICE — INFLATION DEVICE BASIXCOMPAK

## (undated) DEVICE — DRAPE C ARM UNIVERSAL

## (undated) DEVICE — MEDICATION LABELS W MARKER

## (undated) DEVICE — DRSG TEGADERM 6"X8"

## (undated) DEVICE — DRAPE INSTRUMENT POUCH 6.75" X 11"

## (undated) DEVICE — SYR LUER LOK 20CC

## (undated) DEVICE — SUT SOFSILK 0 18" TIES

## (undated) DEVICE — BLADE SCALPEL SAFETYLOCK #11

## (undated) DEVICE — TUBING CONTRAST INJECTION HIGH PRESSURE 1200PSI 72"

## (undated) DEVICE — WARMING BLANKET UPPER ADULT

## (undated) DEVICE — DRAPE FEMORAL ANGIOGRAPHY 80X112"

## (undated) DEVICE — GOWN XL

## (undated) DEVICE — TUBING HIGH POWER CONTRAST INJ

## (undated) DEVICE — SUT SOFSILK 2-0 18" TIES

## (undated) DEVICE — DRSG OPSITE 13.75 X 4"